# Patient Record
Sex: FEMALE | Race: WHITE | NOT HISPANIC OR LATINO | Employment: FULL TIME | URBAN - METROPOLITAN AREA
[De-identification: names, ages, dates, MRNs, and addresses within clinical notes are randomized per-mention and may not be internally consistent; named-entity substitution may affect disease eponyms.]

---

## 2018-01-11 ENCOUNTER — RECORDS - HEALTHEAST (OUTPATIENT)
Dept: LAB | Facility: CLINIC | Age: 48
End: 2018-01-11

## 2018-01-11 LAB
ALBUMIN SERPL-MCNC: 3.5 G/DL (ref 3.5–5)
ALBUMIN UR-MCNC: NEGATIVE MG/DL
ALP SERPL-CCNC: 74 U/L (ref 45–120)
ALT SERPL W P-5'-P-CCNC: 15 U/L (ref 0–45)
ANION GAP SERPL CALCULATED.3IONS-SCNC: 7 MMOL/L (ref 5–18)
APPEARANCE UR: ABNORMAL
AST SERPL W P-5'-P-CCNC: 18 U/L (ref 0–40)
BACTERIA #/AREA URNS HPF: ABNORMAL HPF
BASOPHILS # BLD AUTO: 0 THOU/UL (ref 0–0.2)
BASOPHILS NFR BLD AUTO: 1 % (ref 0–2)
BILIRUB SERPL-MCNC: 0.5 MG/DL (ref 0–1)
BILIRUB UR QL STRIP: NEGATIVE
BUN SERPL-MCNC: 11 MG/DL (ref 8–22)
C REACTIVE PROTEIN LHE: 0.5 MG/DL (ref 0–0.8)
CALCIUM SERPL-MCNC: 9.1 MG/DL (ref 8.5–10.5)
CHLORIDE BLD-SCNC: 105 MMOL/L (ref 98–107)
CHOLEST SERPL-MCNC: 194 MG/DL
CO2 SERPL-SCNC: 28 MMOL/L (ref 22–31)
COLOR UR AUTO: YELLOW
CREAT SERPL-MCNC: 0.67 MG/DL (ref 0.6–1.1)
EOSINOPHIL # BLD AUTO: 0.1 THOU/UL (ref 0–0.4)
EOSINOPHIL NFR BLD AUTO: 2 % (ref 0–6)
ERYTHROCYTE [DISTWIDTH] IN BLOOD BY AUTOMATED COUNT: 11.9 % (ref 11–14.5)
FASTING STATUS PATIENT QL REPORTED: NO
GFR SERPL CREATININE-BSD FRML MDRD: >60 ML/MIN/1.73M2
GLUCOSE BLD-MCNC: 69 MG/DL (ref 70–125)
GLUCOSE UR STRIP-MCNC: NEGATIVE MG/DL
HCT VFR BLD AUTO: 37.7 % (ref 35–47)
HDLC SERPL-MCNC: 57 MG/DL
HGB BLD-MCNC: 12.5 G/DL (ref 12–16)
HGB UR QL STRIP: ABNORMAL
KETONES UR STRIP-MCNC: NEGATIVE MG/DL
LDLC SERPL CALC-MCNC: 118 MG/DL
LEUKOCYTE ESTERASE UR QL STRIP: ABNORMAL
LYMPHOCYTES # BLD AUTO: 1.5 THOU/UL (ref 0.8–4.4)
LYMPHOCYTES NFR BLD AUTO: 26 % (ref 20–40)
MCH RBC QN AUTO: 31.6 PG (ref 27–34)
MCHC RBC AUTO-ENTMCNC: 33.2 G/DL (ref 32–36)
MCV RBC AUTO: 95 FL (ref 80–100)
MONOCYTES # BLD AUTO: 0.5 THOU/UL (ref 0–0.9)
MONOCYTES NFR BLD AUTO: 9 % (ref 2–10)
MUCOUS THREADS #/AREA URNS LPF: ABNORMAL LPF
NEUTROPHILS # BLD AUTO: 3.6 THOU/UL (ref 2–7.7)
NEUTROPHILS NFR BLD AUTO: 62 % (ref 50–70)
NITRATE UR QL: NEGATIVE
PH UR STRIP: 5 [PH] (ref 4.5–8)
PLATELET # BLD AUTO: 261 THOU/UL (ref 140–440)
PMV BLD AUTO: 10.7 FL (ref 8.5–12.5)
POTASSIUM BLD-SCNC: 4.8 MMOL/L (ref 3.5–5)
PROT SERPL-MCNC: 7.1 G/DL (ref 6–8)
RBC # BLD AUTO: 3.96 MILL/UL (ref 3.8–5.4)
RBC #/AREA URNS AUTO: ABNORMAL HPF
SODIUM SERPL-SCNC: 140 MMOL/L (ref 136–145)
SP GR UR STRIP: 1.02 (ref 1–1.03)
SQUAMOUS #/AREA URNS AUTO: >100 LPF
T4 FREE SERPL-MCNC: 0.9 NG/DL (ref 0.7–1.8)
TRIGL SERPL-MCNC: 97 MG/DL
TSH SERPL DL<=0.005 MIU/L-ACNC: 1.52 UIU/ML (ref 0.3–5)
UROBILINOGEN UR STRIP-ACNC: ABNORMAL
WBC #/AREA URNS AUTO: ABNORMAL HPF
WBC: 5.8 THOU/UL (ref 4–11)

## 2018-01-12 LAB
BACTERIA SPEC CULT: NO GROWTH
BKR LAB AP ABNORMAL BLEEDING: NO
BKR LAB AP BIRTH CONTROL/HORMONES: NORMAL
BKR LAB AP CERVICAL APPEARANCE: NORMAL
BKR LAB AP GYN ADEQUACY: NORMAL
BKR LAB AP GYN INTERPRETATION: NORMAL
BKR LAB AP GYN OTHER FINDINGS: NORMAL
BKR LAB AP HPV REFLEX: NORMAL
BKR LAB AP LMP: NORMAL
BKR LAB AP PATIENT STATUS: NORMAL
BKR LAB AP PREVIOUS ABNORMAL: NORMAL
BKR LAB AP PREVIOUS NORMAL: 2016
HIGH RISK?: NO
PATH REPORT.COMMENTS IMP SPEC: NORMAL
RESULT FLAG (HE HISTORICAL CONVERSION): NORMAL

## 2018-01-30 ENCOUNTER — RECORDS - HEALTHEAST (OUTPATIENT)
Dept: LAB | Facility: CLINIC | Age: 48
End: 2018-01-30

## 2018-02-01 LAB — BACTERIA SPEC CULT: NORMAL

## 2018-02-08 ENCOUNTER — TRANSFERRED RECORDS (OUTPATIENT)
Dept: HEALTH INFORMATION MANAGEMENT | Facility: CLINIC | Age: 48
End: 2018-02-08

## 2018-05-23 ENCOUNTER — RECORDS - HEALTHEAST (OUTPATIENT)
Dept: LAB | Facility: CLINIC | Age: 48
End: 2018-05-23

## 2018-05-23 LAB
ALBUMIN SERPL-MCNC: 3.7 G/DL (ref 3.5–5)
ALBUMIN UR-MCNC: NEGATIVE MG/DL
ALP SERPL-CCNC: 79 U/L (ref 45–120)
ALT SERPL W P-5'-P-CCNC: <9 U/L (ref 0–45)
ANION GAP SERPL CALCULATED.3IONS-SCNC: 8 MMOL/L (ref 5–18)
APPEARANCE UR: ABNORMAL
AST SERPL W P-5'-P-CCNC: 16 U/L (ref 0–40)
BACTERIA #/AREA URNS HPF: ABNORMAL HPF
BASOPHILS # BLD AUTO: 0.1 THOU/UL (ref 0–0.2)
BASOPHILS NFR BLD AUTO: 1 % (ref 0–2)
BILIRUB SERPL-MCNC: 0.5 MG/DL (ref 0–1)
BILIRUB UR QL STRIP: NEGATIVE
BUN SERPL-MCNC: 7 MG/DL (ref 8–22)
CALCIUM SERPL-MCNC: 9.2 MG/DL (ref 8.5–10.5)
CHLORIDE BLD-SCNC: 107 MMOL/L (ref 98–107)
CHOLEST SERPL-MCNC: 200 MG/DL
CO2 SERPL-SCNC: 26 MMOL/L (ref 22–31)
COLOR UR AUTO: YELLOW
CREAT SERPL-MCNC: 0.69 MG/DL (ref 0.6–1.1)
EOSINOPHIL # BLD AUTO: 0.1 THOU/UL (ref 0–0.4)
EOSINOPHIL NFR BLD AUTO: 2 % (ref 0–6)
ERYTHROCYTE [DISTWIDTH] IN BLOOD BY AUTOMATED COUNT: 12 % (ref 11–14.5)
FASTING STATUS PATIENT QL REPORTED: ABNORMAL
FERRITIN SERPL-MCNC: 145 NG/ML (ref 10–130)
GFR SERPL CREATININE-BSD FRML MDRD: >60 ML/MIN/1.73M2
GLUCOSE BLD-MCNC: 81 MG/DL (ref 70–125)
GLUCOSE UR STRIP-MCNC: NEGATIVE MG/DL
HCT VFR BLD AUTO: 37.8 % (ref 35–47)
HDLC SERPL-MCNC: 50 MG/DL
HGB BLD-MCNC: 12.3 G/DL (ref 12–16)
HGB UR QL STRIP: NEGATIVE
IRON SATN MFR SERPL: 53 % (ref 20–50)
IRON SERPL-MCNC: 121 UG/DL (ref 42–175)
KETONES UR STRIP-MCNC: NEGATIVE MG/DL
LDLC SERPL CALC-MCNC: 131 MG/DL
LEUKOCYTE ESTERASE UR QL STRIP: NEGATIVE
LYMPHOCYTES # BLD AUTO: 1.5 THOU/UL (ref 0.8–4.4)
LYMPHOCYTES NFR BLD AUTO: 29 % (ref 20–40)
MCH RBC QN AUTO: 31.5 PG (ref 27–34)
MCHC RBC AUTO-ENTMCNC: 32.5 G/DL (ref 32–36)
MCV RBC AUTO: 97 FL (ref 80–100)
MONOCYTES # BLD AUTO: 0.4 THOU/UL (ref 0–0.9)
MONOCYTES NFR BLD AUTO: 8 % (ref 2–10)
MONOCYTES NFR BLD AUTO: POSITIVE %
MUCOUS THREADS #/AREA URNS LPF: ABNORMAL LPF
NEUTROPHILS # BLD AUTO: 3.2 THOU/UL (ref 2–7.7)
NEUTROPHILS NFR BLD AUTO: 61 % (ref 50–70)
NITRATE UR QL: NEGATIVE
PH UR STRIP: 7.5 [PH] (ref 4.5–8)
PLATELET # BLD AUTO: 246 THOU/UL (ref 140–440)
PMV BLD AUTO: 11.2 FL (ref 8.5–12.5)
POTASSIUM BLD-SCNC: 4.4 MMOL/L (ref 3.5–5)
PROT SERPL-MCNC: 6.9 G/DL (ref 6–8)
RBC # BLD AUTO: 3.9 MILL/UL (ref 3.8–5.4)
RBC #/AREA URNS AUTO: ABNORMAL HPF
SODIUM SERPL-SCNC: 141 MMOL/L (ref 136–145)
SP GR UR STRIP: 1.01 (ref 1–1.03)
SQUAMOUS #/AREA URNS AUTO: >100 LPF
T4 FREE SERPL-MCNC: 0.8 NG/DL (ref 0.7–1.8)
TIBC SERPL-MCNC: 227 UG/DL (ref 313–563)
TRANSFERRIN SERPL-MCNC: 182 MG/DL (ref 212–360)
TRIGL SERPL-MCNC: 94 MG/DL
TSH SERPL DL<=0.005 MIU/L-ACNC: 1.27 UIU/ML (ref 0.3–5)
UROBILINOGEN UR STRIP-ACNC: ABNORMAL
WBC #/AREA URNS AUTO: ABNORMAL HPF
WBC: 5.2 THOU/UL (ref 4–11)

## 2018-08-15 ENCOUNTER — TRANSFERRED RECORDS (OUTPATIENT)
Dept: HEALTH INFORMATION MANAGEMENT | Facility: CLINIC | Age: 48
End: 2018-08-15

## 2018-08-15 ENCOUNTER — RECORDS - HEALTHEAST (OUTPATIENT)
Dept: LAB | Facility: CLINIC | Age: 48
End: 2018-08-15

## 2018-08-15 LAB
ALBUMIN SERPL-MCNC: 3.5 G/DL (ref 3.5–5)
ALP SERPL-CCNC: 67 U/L (ref 45–120)
ALT SERPL W P-5'-P-CCNC: 17 U/L (ref 0–45)
ANION GAP SERPL CALCULATED.3IONS-SCNC: 12 MMOL/L (ref 5–18)
AST SERPL W P-5'-P-CCNC: 33 U/L (ref 0–40)
BASOPHILS # BLD AUTO: 0 THOU/UL (ref 0–0.2)
BASOPHILS NFR BLD AUTO: 1 % (ref 0–2)
BILIRUB SERPL-MCNC: 0.5 MG/DL (ref 0–1)
BUN SERPL-MCNC: 11 MG/DL (ref 8–22)
CALCIUM SERPL-MCNC: 9.2 MG/DL (ref 8.5–10.5)
CHLORIDE BLD-SCNC: 104 MMOL/L (ref 98–107)
CO2 SERPL-SCNC: 23 MMOL/L (ref 22–31)
CREAT SERPL-MCNC: 0.71 MG/DL (ref 0.6–1.1)
EOSINOPHIL # BLD AUTO: 0.1 THOU/UL (ref 0–0.4)
EOSINOPHIL NFR BLD AUTO: 1 % (ref 0–6)
ERYTHROCYTE [DISTWIDTH] IN BLOOD BY AUTOMATED COUNT: 11.9 % (ref 11–14.5)
GFR SERPL CREATININE-BSD FRML MDRD: >60 ML/MIN/1.73M2
GLUCOSE BLD-MCNC: 95 MG/DL (ref 70–125)
HCT VFR BLD AUTO: 38.6 % (ref 35–47)
HGB BLD-MCNC: 12.9 G/DL (ref 12–16)
LYMPHOCYTES # BLD AUTO: 1.4 THOU/UL (ref 0.8–4.4)
LYMPHOCYTES NFR BLD AUTO: 21 % (ref 20–40)
MCH RBC QN AUTO: 31.9 PG (ref 27–34)
MCHC RBC AUTO-ENTMCNC: 33.4 G/DL (ref 32–36)
MCV RBC AUTO: 95 FL (ref 80–100)
MONOCYTES # BLD AUTO: 0.4 THOU/UL (ref 0–0.9)
MONOCYTES NFR BLD AUTO: 6 % (ref 2–10)
NEUTROPHILS # BLD AUTO: 4.8 THOU/UL (ref 2–7.7)
NEUTROPHILS NFR BLD AUTO: 71 % (ref 50–70)
PLATELET # BLD AUTO: 233 THOU/UL (ref 140–440)
PMV BLD AUTO: 11.5 FL (ref 8.5–12.5)
POTASSIUM BLD-SCNC: 4 MMOL/L (ref 3.5–5)
PROT SERPL-MCNC: 6.7 G/DL (ref 6–8)
RBC # BLD AUTO: 4.05 MILL/UL (ref 3.8–5.4)
SODIUM SERPL-SCNC: 139 MMOL/L (ref 136–145)
WBC: 6.9 THOU/UL (ref 4–11)

## 2018-08-16 LAB — B BURGDOR IGG+IGM SER QL: 0.31 INDEX VALUE

## 2018-12-11 ENCOUNTER — RECORDS - HEALTHEAST (OUTPATIENT)
Dept: LAB | Facility: CLINIC | Age: 48
End: 2018-12-11

## 2018-12-11 LAB
BASOPHILS # BLD AUTO: 0 THOU/UL (ref 0–0.2)
BASOPHILS NFR BLD AUTO: 1 % (ref 0–2)
EOSINOPHIL # BLD AUTO: 0.1 THOU/UL (ref 0–0.4)
EOSINOPHIL NFR BLD AUTO: 1 % (ref 0–6)
ERYTHROCYTE [DISTWIDTH] IN BLOOD BY AUTOMATED COUNT: 12.1 % (ref 11–14.5)
HCT VFR BLD AUTO: 39.8 % (ref 35–47)
HGB BLD-MCNC: 12.9 G/DL (ref 12–16)
LYMPHOCYTES # BLD AUTO: 0.8 THOU/UL (ref 0.8–4.4)
LYMPHOCYTES NFR BLD AUTO: 10 % (ref 20–40)
MCH RBC QN AUTO: 30.9 PG (ref 27–34)
MCHC RBC AUTO-ENTMCNC: 32.4 G/DL (ref 32–36)
MCV RBC AUTO: 95 FL (ref 80–100)
MONOCYTES # BLD AUTO: 0.8 THOU/UL (ref 0–0.9)
MONOCYTES NFR BLD AUTO: 10 % (ref 2–10)
NEUTROPHILS # BLD AUTO: 6.4 THOU/UL (ref 2–7.7)
NEUTROPHILS NFR BLD AUTO: 79 % (ref 50–70)
PLATELET # BLD AUTO: 229 THOU/UL (ref 140–440)
PMV BLD AUTO: 10.8 FL (ref 8.5–12.5)
RBC # BLD AUTO: 4.17 MILL/UL (ref 3.8–5.4)
WBC: 8.1 THOU/UL (ref 4–11)

## 2018-12-13 LAB — BACTERIA SPEC CULT: NORMAL

## 2019-05-17 ENCOUNTER — RECORDS - HEALTHEAST (OUTPATIENT)
Dept: LAB | Facility: CLINIC | Age: 49
End: 2019-05-17

## 2019-05-17 LAB
CHOLEST SERPL-MCNC: 211 MG/DL
FASTING STATUS PATIENT QL REPORTED: NO
HDLC SERPL-MCNC: 53 MG/DL
LDLC SERPL CALC-MCNC: 133 MG/DL
TRIGL SERPL-MCNC: 126 MG/DL

## 2019-05-21 ENCOUNTER — TRANSFERRED RECORDS (OUTPATIENT)
Dept: HEALTH INFORMATION MANAGEMENT | Facility: CLINIC | Age: 49
End: 2019-05-21

## 2019-10-28 ENCOUNTER — RECORDS - HEALTHEAST (OUTPATIENT)
Dept: LAB | Facility: CLINIC | Age: 49
End: 2019-10-28

## 2019-10-28 ENCOUNTER — TRANSFERRED RECORDS (OUTPATIENT)
Dept: HEALTH INFORMATION MANAGEMENT | Facility: CLINIC | Age: 49
End: 2019-10-28

## 2019-10-28 LAB
ALBUMIN UR-MCNC: ABNORMAL MG/DL
APPEARANCE UR: CLEAR
BACTERIA #/AREA URNS HPF: ABNORMAL HPF
BASOPHILS # BLD AUTO: 0 THOU/UL (ref 0–0.2)
BASOPHILS NFR BLD AUTO: 0 % (ref 0–2)
BILIRUB UR QL STRIP: NEGATIVE
COLOR UR AUTO: YELLOW
EOSINOPHIL # BLD AUTO: 0.1 THOU/UL (ref 0–0.4)
EOSINOPHIL NFR BLD AUTO: 1 % (ref 0–6)
ERYTHROCYTE [DISTWIDTH] IN BLOOD BY AUTOMATED COUNT: 11.9 % (ref 11–14.5)
GLUCOSE UR STRIP-MCNC: NEGATIVE MG/DL
HCT VFR BLD AUTO: 38 % (ref 35–47)
HGB BLD-MCNC: 12.5 G/DL (ref 12–16)
HGB UR QL STRIP: ABNORMAL
KETONES UR STRIP-MCNC: NEGATIVE MG/DL
LEUKOCYTE ESTERASE UR QL STRIP: NEGATIVE
LYMPHOCYTES # BLD AUTO: 1.5 THOU/UL (ref 0.8–4.4)
LYMPHOCYTES NFR BLD AUTO: 21 % (ref 20–40)
MCH RBC QN AUTO: 31 PG (ref 27–34)
MCHC RBC AUTO-ENTMCNC: 32.9 G/DL (ref 32–36)
MCV RBC AUTO: 94 FL (ref 80–100)
MONOCYTES # BLD AUTO: 0.5 THOU/UL (ref 0–0.9)
MONOCYTES NFR BLD AUTO: 8 % (ref 2–10)
MUCOUS THREADS #/AREA URNS LPF: ABNORMAL LPF
NEUTROPHILS # BLD AUTO: 4.7 THOU/UL (ref 2–7.7)
NEUTROPHILS NFR BLD AUTO: 69 % (ref 50–70)
NITRATE UR QL: NEGATIVE
PH UR STRIP: 5 [PH] (ref 4.5–8)
PLATELET # BLD AUTO: 243 THOU/UL (ref 140–440)
PMV BLD AUTO: 11.2 FL (ref 8.5–12.5)
RBC # BLD AUTO: 4.03 MILL/UL (ref 3.8–5.4)
RBC #/AREA URNS AUTO: ABNORMAL HPF
SP GR UR STRIP: 1.02 (ref 1–1.03)
SQUAMOUS #/AREA URNS AUTO: ABNORMAL LPF
URATE CRY #/AREA URNS HPF: PRESENT /[HPF]
UROBILINOGEN UR STRIP-ACNC: ABNORMAL
WBC #/AREA URNS AUTO: ABNORMAL HPF
WBC CLUMPS #/AREA URNS HPF: PRESENT /[HPF]
WBC: 6.9 THOU/UL (ref 4–11)

## 2019-10-30 LAB — BACTERIA SPEC CULT: NORMAL

## 2020-01-06 ENCOUNTER — TRANSFERRED RECORDS (OUTPATIENT)
Dept: HEALTH INFORMATION MANAGEMENT | Facility: CLINIC | Age: 50
End: 2020-01-06

## 2020-02-21 ENCOUNTER — OFFICE VISIT (OUTPATIENT)
Dept: FAMILY MEDICINE | Facility: CLINIC | Age: 50
End: 2020-02-21
Attending: FAMILY MEDICINE
Payer: COMMERCIAL

## 2020-02-21 ENCOUNTER — TELEPHONE (OUTPATIENT)
Dept: GASTROENTEROLOGY | Facility: CLINIC | Age: 50
End: 2020-02-21

## 2020-02-21 VITALS
SYSTOLIC BLOOD PRESSURE: 145 MMHG | BODY MASS INDEX: 31.97 KG/M2 | HEART RATE: 61 BPM | HEIGHT: 65 IN | DIASTOLIC BLOOD PRESSURE: 86 MMHG | WEIGHT: 191.9 LBS

## 2020-02-21 DIAGNOSIS — Z13.220 LIPID SCREENING: ICD-10-CM

## 2020-02-21 DIAGNOSIS — R03.0 ELEVATED BP WITHOUT DIAGNOSIS OF HYPERTENSION: ICD-10-CM

## 2020-02-21 DIAGNOSIS — Z82.49 FAMILY HISTORY OF EARLY CAD: ICD-10-CM

## 2020-02-21 DIAGNOSIS — Z12.11 SPECIAL SCREENING FOR MALIGNANT NEOPLASMS, COLON: Primary | ICD-10-CM

## 2020-02-21 DIAGNOSIS — Z13.21 ENCOUNTER FOR VITAMIN DEFICIENCY SCREENING: ICD-10-CM

## 2020-02-21 DIAGNOSIS — Z82.49: ICD-10-CM

## 2020-02-21 DIAGNOSIS — Z13.1 SCREENING FOR DIABETES MELLITUS: ICD-10-CM

## 2020-02-21 DIAGNOSIS — Z12.39 SCREENING FOR BREAST CANCER: ICD-10-CM

## 2020-02-21 LAB
ANION GAP SERPL CALCULATED.3IONS-SCNC: 3 MMOL/L (ref 3–14)
BUN SERPL-MCNC: 10 MG/DL (ref 7–30)
CALCIUM SERPL-MCNC: 8.7 MG/DL (ref 8.5–10.1)
CHLORIDE SERPL-SCNC: 109 MMOL/L (ref 94–109)
CHOLEST SERPL-MCNC: 229 MG/DL
CO2 SERPL-SCNC: 28 MMOL/L (ref 20–32)
CREAT SERPL-MCNC: 0.64 MG/DL (ref 0.52–1.04)
GFR SERPL CREATININE-BSD FRML MDRD: >90 ML/MIN/{1.73_M2}
GLUCOSE SERPL-MCNC: 79 MG/DL (ref 70–99)
HDLC SERPL-MCNC: 63 MG/DL
LDLC SERPL CALC-MCNC: 144 MG/DL
NONHDLC SERPL-MCNC: 166 MG/DL
POTASSIUM SERPL-SCNC: 4.4 MMOL/L (ref 3.4–5.3)
SODIUM SERPL-SCNC: 140 MMOL/L (ref 133–144)
TRIGL SERPL-MCNC: 110 MG/DL
TSH SERPL DL<=0.005 MIU/L-ACNC: 1.44 MU/L (ref 0.4–4)

## 2020-02-21 PROCEDURE — 80061 LIPID PANEL: CPT | Performed by: FAMILY MEDICINE

## 2020-02-21 PROCEDURE — 82306 VITAMIN D 25 HYDROXY: CPT | Performed by: FAMILY MEDICINE

## 2020-02-21 PROCEDURE — 84443 ASSAY THYROID STIM HORMONE: CPT | Performed by: FAMILY MEDICINE

## 2020-02-21 PROCEDURE — 90750 HZV VACC RECOMBINANT IM: CPT | Mod: ZF

## 2020-02-21 PROCEDURE — 80048 BASIC METABOLIC PNL TOTAL CA: CPT | Performed by: FAMILY MEDICINE

## 2020-02-21 PROCEDURE — G0463 HOSPITAL OUTPT CLINIC VISIT: HCPCS | Mod: ZF,25

## 2020-02-21 PROCEDURE — 25000581 ZZH RX MED A9270 GY (STAT IND- M) 250: Mod: ZF

## 2020-02-21 PROCEDURE — 36415 COLL VENOUS BLD VENIPUNCTURE: CPT | Performed by: FAMILY MEDICINE

## 2020-02-21 PROCEDURE — 90471 IMMUNIZATION ADMIN: CPT | Mod: ZF

## 2020-02-21 RX ORDER — ESCITALOPRAM OXALATE 10 MG/1
TABLET ORAL
COMMUNITY
Start: 2020-01-20 | End: 2021-07-21

## 2020-02-21 ASSESSMENT — ANXIETY QUESTIONNAIRES
2. NOT BEING ABLE TO STOP OR CONTROL WORRYING: NOT AT ALL
5. BEING SO RESTLESS THAT IT IS HARD TO SIT STILL: NOT AT ALL
GAD7 TOTAL SCORE: 0
3. WORRYING TOO MUCH ABOUT DIFFERENT THINGS: NOT AT ALL
7. FEELING AFRAID AS IF SOMETHING AWFUL MIGHT HAPPEN: NOT AT ALL
6. BECOMING EASILY ANNOYED OR IRRITABLE: NOT AT ALL
1. FEELING NERVOUS, ANXIOUS, OR ON EDGE: NOT AT ALL

## 2020-02-21 ASSESSMENT — PATIENT HEALTH QUESTIONNAIRE - PHQ9: 5. POOR APPETITE OR OVEREATING: NOT AT ALL

## 2020-02-21 ASSESSMENT — MIFFLIN-ST. JEOR: SCORE: 1486.33

## 2020-02-21 NOTE — LETTER
2020       RE: Karin DARBY Neeta  [address] [place]  [facility] [address]     Dear Colleague,    Thank you for referring your patient, Karin Santacruz, to the [facility] at [facility]. Please see a copy of my visit note below.    Pt. Here for CPE, new patient     1. Concern  About bp going up-has-not checked outside office, formerly had been 110/70, at drJeffrey Visit with URI recently SBP was in 130's    2.  Weight  Gain x 10 years.  Tried intermittent fasting--don't eat till 12pm, stop at 8pm, otherwise water only.   Weight had plateaud with this but feels better, working on mindful on what eat/portion during eating time.  More difficult losing weight after age 47. Activity 2x/wk x 6 months--circuit training cardio 30 min. prior 2 years ago, did moderate cross fit, but then tore meniscus.   3. Breast--no concerns; mammogram due, no prior abnormals  4. Gyne--menses still regular, no hot flashes/night sweats, last 3 days, light  Last Pap 2 years ago, no record, hpv done? Will need records;   ,  both  ; post partum hemorrhage, no pregnancy conplications  8lb 14 oz biggest (children now 18, 20)  Vaginal --no concerns  Urinary--no oncerns  No sexual concerns  Currently sexual active w/; no outside partners  No  Hx STI, hiv testing:  Never tested except maybe  Pregnancy  Cannot find immunization records  Traveled to [place]--? May had  Hep B vaccine  Due Shingrix  Did flu vaccine    8. Bone health--little dairy; activity as above; grandmother osteoporosis  9.HCM--immunization?  --Due Colon cancer screening: prefers colonscopy  --had  Cardiac calcium scoring 2018: zero  Lipid 2019,  Total vs. [id number]--vboadfc  Glucose screening not available      PMH  Shigella at child  No other surgeries--cataracts    Family History   Problem Relation Age of Onset     Breast Cancer Mother      Melanoma Mother      Angina Father      Hypertension Father   "    Mother--breast ca. 81 htn lipid melnoma,thyroid, renal artery stenoisis  Father--htn, d. MI 42, lipids  Sibs: sister--htn, hashimotos, renal artery stenosis  p grandfather--d. MI under 50   Mat grandfather--d MI under 50  Mat grandmother--htn, lipid  Niece bipolar      Social History     Socioeconomic History     Marital status: Single     Spouse name: None     Number of children: None     Years of education: None     Highest education level: None   Occupational History     None   Social Needs     Financial resource strain: None     Food insecurity:     Worry: None     Inability: None     Transportation needs:     Medical: None     Non-medical: None   Tobacco Use     Smoking status: Former Smoker     Smokeless tobacco: Never Used   Substance and Sexual Activity     Alcohol use: Yes     Comment: occ     Drug use: Never     Sexual activity: Yes     Partners: Male   Lifestyle     Physical activity:     Days per week: None     Minutes per session: None     Stress: None   Relationships     Social connections:     Talks on phone: None     Gets together: None     Attends Restorationism service: None     Active member of club or organization: None     Attends meetings of clubs or organizations: None     Relationship status: None     Intimate partner violence:     Fear of current or ex partner: None     Emotionally abused: None     Physically abused: None     Forced sexual activity: None   Other Topics Concern     None   Social History Narrative     None     stanard diet little diet  No supplement --consider Vit D  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  Body mass index is 31.93 kg/m .   Constitutional: Well appearing woman in no acute distress.   Psychological: appropriate mood.  Eyes: anicteric, normal extra-ocular movements,  pupils are equal and reactive to light.   Ears, Nose and Throat: tympanic membranes clear, nose clear and free of lesions, throat clear, moist mucous membrames, neck supple with full range of motion.    Neck: No thyroidmegaly. No jugular venous distension, no carotid bruits.  Cardiovascular: regular rate and rhythm, normal S1 and S2, no murmurs, rubs or gallops, peripheral pulses full and symmetric   Respiratory: clear to auscultation, no wheezes or crackles, normal breath sounds.    Gastrointestinal: positive bowel sounds, nontender, no hepatosplenomegaly, no masses. No guarding or rebound.    Lymphatic: no cervical lymphadenopathy.  Musculoskeletal: full range of motion, no edema and motor strength is equal in the upper and lower extremities    Skin: no concerning lesions, no jaundice. Small skin tags under bra line, eyelids  Neurological: cranial nerves intact, normal strength and sensation, reflexes at patella and biceps normal, normal gait, no tremor.   Monofilament Foot Exam: n/a  A/P  1. HCM  Immunizations--  Shingrix today, to request rest of immunization records from previous clinic    Cancer screening--Need:  Mammogram  Colonoscopy   Request record on last Pap/HPV screening, and will schedule pelvic exam if needed    CV risk- Reviewed family history with patient, cardiac calcium score reassuring. Repeat lipids, fasting glucose, see BP below    Bone Health--Up to date, continue weight bearing activity, check Vit.  D    STI/HIV Screening --Reviewed recommendation for at least 1 HIV screen in lifetime.     2. Elevated BP  3. Fhx renal artery stenosis  Check  bp outside office different times per day;5-6 times over next few weeks  Write down results--goal is 130/80 or less. If averaging high, consider further  evaluation, especially for renal artery stenosis  Check BMP, TSH    4. Weight gain:   discussed set points, metabolism change with age, recommend 5% weight loss goal for health benefit, Counseled regarding simple diet changes, include  Reduce portion simple carbs    Follow-up 3 months blood pressure      Again, thank you for allowing me to participate in the care of your patient.      Sincerely,    Heron Manning MD

## 2020-02-21 NOTE — LETTER
Date:March 4, 2020      Patient was self referred, no letter generated. Do not send.        [facility] Information

## 2020-02-21 NOTE — PROGRESS NOTES
Pt. Here for CPE, new patient     1. Concern  About bp going up-has-not checked outside office, formerly had been 110/70, at  Visit with ARGELIA recently SBP was in 130's    2.  Weight  Gain x 10 years.  Tried intermittent fasting--don't eat till 12pm, stop at 8pm, otherwise water only.   Weight had plateaud with this but feels better, working on mindful on what eat/portion during eating time.  More difficult losing weight after age 47. Activity 2x/wk x 6 months--circuit training cardio 30 min. prior 2 years ago, did moderate cross fit, but then tore meniscus.   3. Breast--no concerns; mammogram due, no prior abnormals  4. Gyne--menses still regular, no hot flashes/night sweats, last 3 days, light  Last Pap 2 years ago, no record, hpv done? Will need records;   ,  both  ; post partum hemorrhage, no pregnancy conplications  8lb 14 oz biggest (children now 18, 20)  Vaginal --no concerns  Urinary--no oncerns  No sexual concerns  Currently sexual active w/; no outside partners  No  Hx STI, hiv testing:  Never tested except maybe  Pregnancy  Cannot find immunization records  Traveled to [place]--? May had  Hep B vaccine  Due Shingrix  Did flu vaccine    8. Bone health--little dairy; activity as above; grandmother osteoporosis  9.HCM--immunization?  --Due Colon cancer screening: prefers colonscopy  --had  Cardiac calcium scoring 2018: zero  Lipid 2019,  Total vs. DIG213--gtljlvk  Glucose screening not available      PMH  Shigella at child  No other surgeries--cataracts    Family History   Problem Relation Age of Onset     Breast Cancer Mother      Melanoma Mother      Angina Father      Hypertension Father      Mother--breast ca. 81 htn lipid melnoma,thyroid, renal artery stenoisis  Father--htn, d. MI 42, lipids  Sibs: sister--htn, hashimotos, renal artery stenosis  p grandfather--d. MI under 50   Mat grandfather--d MI under 50  Mat grandmother--htn, lipid  Niece bipolar      Social History  "    Socioeconomic History     Marital status: Single     Spouse name: None     Number of children: None     Years of education: None     Highest education level: None   Occupational History     None   Social Needs     Financial resource strain: None     Food insecurity:     Worry: None     Inability: None     Transportation needs:     Medical: None     Non-medical: None   Tobacco Use     Smoking status: Former Smoker     Smokeless tobacco: Never Used   Substance and Sexual Activity     Alcohol use: Yes     Comment: occ     Drug use: Never     Sexual activity: Yes     Partners: Male   Lifestyle     Physical activity:     Days per week: None     Minutes per session: None     Stress: None   Relationships     Social connections:     Talks on phone: None     Gets together: None     Attends Mandaen service: None     Active member of club or organization: None     Attends meetings of clubs or organizations: None     Relationship status: None     Intimate partner violence:     Fear of current or ex partner: None     Emotionally abused: None     Physically abused: None     Forced sexual activity: None   Other Topics Concern     None   Social History Narrative     None     stanard diet little diet  No supplement --consider Vit D  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  Body mass index is 31.93 kg/m .   Constitutional: Well appearing woman in no acute distress.   Psychological: appropriate mood.  Eyes: anicteric, normal extra-ocular movements,  pupils are equal and reactive to light.   Ears, Nose and Throat: tympanic membranes clear, " nose clear and free of lesions, throat clear, moist mucous membrames, neck supple with full range of motion.    Neck: No thyroidmegaly. No jugular venous distension, no carotid bruits.  Cardiovascular: regular rate and rhythm, normal S1 and S2, no murmurs, rubs or gallops, peripheral pulses full and symmetric   Respiratory: clear to auscultation, no wheezes or crackles, normal breath sounds.    Gastrointestinal: positive bowel sounds, nontender, no hepatosplenomegaly, no masses. No guarding or rebound.    Lymphatic: no cervical lymphadenopathy.  Musculoskeletal: full range of motion, no edema and motor strength is equal in the upper and lower extremities    Skin: no concerning lesions, no jaundice. Small skin tags under bra line, eyelids  Neurological: cranial nerves intact, normal strength and sensation, reflexes at patella and biceps normal, normal gait, no tremor.   Monofilament Foot Exam: n/a  A/P  1. HCM  Immunizations--  Shingrix today, to request rest of immunization records from previous clinic    Cancer screening--Need:  Mammogram  Colonoscopy   Request record on last Pap/HPV screening, and will schedule pelvic exam if needed    CV risk- Reviewed family history with patient, cardiac calcium score reassuring. Repeat lipids, fasting glucose, see BP below    Bone Health--Up to date, continue weight bearing activity, check Vit.  D    STI/HIV Screening --Reviewed recommendation for at least 1 HIV screen in lifetime.     2. Elevated BP  3. Fhx renal artery stenosis  Check  bp outside office different times per day;5-6 times over next few weeks  Write down results--goal is 130/80 or less. If averaging high, consider further evaluation, especially for renal artery stenosis  Check BMP, TSH    4. Weight gain:   discussed set points, metabolism change with age, recommend 5% weight loss goal for health benefit, Counseled regarding simple diet changes, include  Reduce portion simple carbs    Follow-up 3 months blood  pressure

## 2020-02-22 ASSESSMENT — ANXIETY QUESTIONNAIRES: GAD7 TOTAL SCORE: 0

## 2020-02-24 ENCOUNTER — TELEPHONE (OUTPATIENT)
Dept: GASTROENTEROLOGY | Facility: CLINIC | Age: 50
End: 2020-02-24

## 2020-02-25 ENCOUNTER — TELEPHONE (OUTPATIENT)
Dept: GASTROENTEROLOGY | Facility: CLINIC | Age: 50
End: 2020-02-25

## 2020-02-25 LAB
DEPRECATED CALCIDIOL+CALCIFEROL SERPL-MC: <14 UG/L (ref 20–75)
VITAMIN D2 SERPL-MCNC: <5 UG/L
VITAMIN D3 SERPL-MCNC: 9 UG/L

## 2020-03-02 ENCOUNTER — ANCILLARY PROCEDURE (OUTPATIENT)
Dept: MAMMOGRAPHY | Facility: CLINIC | Age: 50
End: 2020-03-02
Attending: OBSTETRICS & GYNECOLOGY
Payer: COMMERCIAL

## 2020-03-02 DIAGNOSIS — Z12.39 SCREENING FOR BREAST CANCER: ICD-10-CM

## 2020-03-02 PROCEDURE — 77067 SCR MAMMO BI INCL CAD: CPT

## 2020-03-03 ENCOUNTER — DOCUMENTATION ONLY (OUTPATIENT)
Dept: CARE COORDINATION | Facility: CLINIC | Age: 50
End: 2020-03-03

## 2020-03-03 PROBLEM — Z82.49 FAMILY HISTORY OF EARLY CAD: Status: ACTIVE | Noted: 2020-03-03

## 2020-03-03 PROBLEM — Z82.49 FAMILY HISTORY OF RENAL ARTERY STENOSIS: Status: ACTIVE | Noted: 2020-03-03

## 2020-03-03 PROBLEM — R03.0 ELEVATED BP WITHOUT DIAGNOSIS OF HYPERTENSION: Status: ACTIVE | Noted: 2020-03-03

## 2020-03-04 DIAGNOSIS — E55.9 VITAMIN D DEFICIENCY: Primary | ICD-10-CM

## 2020-03-04 RX ORDER — ERGOCALCIFEROL 1.25 MG/1
50000 CAPSULE, LIQUID FILLED ORAL WEEKLY
Qty: 8 CAPSULE | Refills: 0 | Status: SHIPPED | OUTPATIENT
Start: 2020-03-04 | End: 2020-05-13

## 2020-03-04 NOTE — RESULT ENCOUNTER NOTE
Please call with results; Vitamin D low. I will send in prescription Vitamin D (50,000  Units weekly) for 8 weeks, then patient to continue with 1000 units over the counter daily.  Cholesterol is mildly high, no need for medication, rest of labs normal    Heron Manning MD

## 2020-03-10 NOTE — RESULT ENCOUNTER NOTE
Dear Karin,   Here are your recent results which are within the expected range. Please continue with your current plan of care.       Heron Manning MD

## 2020-05-06 ENCOUNTER — TELEPHONE (OUTPATIENT)
Dept: OBGYN | Facility: CLINIC | Age: 50
End: 2020-05-06

## 2020-05-06 DIAGNOSIS — E55.9 HYPOVITAMINOSIS D: Primary | ICD-10-CM

## 2020-05-06 RX ORDER — VITAMIN B COMPLEX
1000 TABLET ORAL DAILY
Qty: 90 TABLET | Refills: 3 | Status: SHIPPED | OUTPATIENT
Start: 2020-05-06

## 2020-05-06 NOTE — TELEPHONE ENCOUNTER
Pt called and left message on nursing voice mail did we receive transferred records from [facility]?    Called and left message on voice mail to please call back and speak with nursing at [phone].  She had earlier call for med questions that we need to discuss.  Also I do see records transferred from [facility].

## 2020-05-06 NOTE — TELEPHONE ENCOUNTER
Received refill request for vitamin D2 50,000 unit tablet.   This was ordered 3/2020. When done, pt was to change to 1000 units daily.     Tried to reach Karin but received voicemail.  Left message that refill request was received for high dose vitamin D but Dr. Manning wants her to change to daily maintenance dose. This will be sent to pharmacy. Call clinic with questions.

## 2020-05-06 NOTE — TELEPHONE ENCOUNTER
Left message on identified VM to return call to nursing at [phone] to discuss questions she has on medication.

## 2020-05-06 NOTE — TELEPHONE ENCOUNTER
----- Message from Jacquelni Messina sent at 5/6/2020 12:03 PM CDT -----  Regarding: medicaiton question  Contact: [phone]  [facility]    Phone Message    May a detailed message be left on voicemail: yes     Reason for Call: Medication Question or concern regarding medication   Prescription Clarification  Name of Medication: vitamin D2 50,000 unit  Prescribing Provider: arpita   Pharmacy: [facility] [id number] [place] [address]    What on the order needs clarification? Per Shelly Is wanting to get a call back in regards to getting a new prescription, please advise.           Action Taken: Message routed to:  Other: womens health    Travel Screening: Not Applicable

## 2020-05-13 ENCOUNTER — VIRTUAL VISIT (OUTPATIENT)
Dept: FAMILY MEDICINE | Facility: CLINIC | Age: 50
End: 2020-05-13
Attending: FAMILY MEDICINE
Payer: COMMERCIAL

## 2020-05-13 VITALS — DIASTOLIC BLOOD PRESSURE: 76 MMHG | SYSTOLIC BLOOD PRESSURE: 133 MMHG

## 2020-05-13 DIAGNOSIS — R10.31 GROIN PAIN, RIGHT: Primary | ICD-10-CM

## 2020-05-13 RX ORDER — ESCITALOPRAM OXALATE 10 MG/1
TABLET ORAL
Status: CANCELLED | OUTPATIENT
Start: 2020-05-13

## 2020-05-13 ASSESSMENT — ANXIETY QUESTIONNAIRES
IF YOU CHECKED OFF ANY PROBLEMS ON THIS QUESTIONNAIRE, HOW DIFFICULT HAVE THESE PROBLEMS MADE IT FOR YOU TO DO YOUR WORK, TAKE CARE OF THINGS AT HOME, OR GET ALONG WITH OTHER PEOPLE: NOT DIFFICULT AT ALL
7. FEELING AFRAID AS IF SOMETHING AWFUL MIGHT HAPPEN: NOT AT ALL
3. WORRYING TOO MUCH ABOUT DIFFERENT THINGS: NOT AT ALL
6. BECOMING EASILY ANNOYED OR IRRITABLE: NOT AT ALL
2. NOT BEING ABLE TO STOP OR CONTROL WORRYING: NOT AT ALL
GAD7 TOTAL SCORE: 0
5. BEING SO RESTLESS THAT IT IS HARD TO SIT STILL: NOT AT ALL
1. FEELING NERVOUS, ANXIOUS, OR ON EDGE: NOT AT ALL

## 2020-05-13 ASSESSMENT — PATIENT HEALTH QUESTIONNAIRE - PHQ9
SUM OF ALL RESPONSES TO PHQ QUESTIONS 1-9: 3
5. POOR APPETITE OR OVEREATING: NOT AT ALL

## 2020-05-13 ASSESSMENT — PAIN SCALES - GENERAL: PAINLEVEL: NO PAIN (0)

## 2020-05-13 NOTE — PROGRESS NOTES
"Karin Santacruz is a 50 year old female who is being evaluated via a billable video visit.      The patient has been notified of following:     \"This video visit will be conducted via a call between you and your physician/provider. We have found that certain health care needs can be provided without the need for an in-person physical exam.  This service lets us provide the care you need with a video conversation.  If a prescription is necessary we can send it directly to your pharmacy.  If lab work is needed we can place an order for that and you can then stop by our lab to have the test done at a later time.    Video visits are billed at different rates depending on your insurance coverage.  Please reach out to your insurance provider with any questions.    If during the course of the call the physician/provider feels a video visit is not appropriate, you will not be charged for this service.\"    Patient has given verbal consent for Video visit? Yes    How would you like to obtain your AVS? Whitleyhar    Patient would like the video invitation sent by: Send to e-mail at: [email or web address]    Will anyone else be joining your video visit? No    Subjective     Karin Santacruz is a 50 year old female who presents today via video visit for the following health issues:    HPI   1.  Blood pressure  At last visit in 2/2020, had been high. Has checked bp outside office, running 130's/low 70.      2. Leg pain/groin pull  For last 2 months,  when leans down to put socks/shoes on and flexes and externally rotates hip on R side, has pain in groin, sharp, shoots down medially down to knee area. Pain lasts few minutes, resolves quickly when changes poisiton. Does not occur at other times. No numb/tingling/weakness.  No trauma to area, nothing else makes better or worse.  Walking 3 miles 4x/wke, , maybe 1x/wk yoga  1st month quarantine did little activity.    3. Weight  Weight only 2 lbs down from 2/2020, " "disappointed  Discussed difficulty of trying to lose weight during current pandemic events  Has changed eating pattern: eliminate red meat, increased water intake, start eat 12 noon stop at 8pm    {  Patient Active Problem List   Diagnosis     Family history of early CAD     Family history of renal artery stenosis     Elevated BP without diagnosis of hypertension     Past Surgical History:   Procedure Laterality Date      SECTION  1999      SECTION         Social History     Tobacco Use     Smoking status: Former Smoker     Smokeless tobacco: Never Used   Substance Use Topics     Alcohol use: Yes     Comment: occ     Family History   Problem Relation Age of Onset     Breast Cancer Mother      Melanoma Mother      Angina Father      Hypertension Father            Reviewed and updated as needed this visit by Provider         Review of Systems   CONSTITUTIONAL: NEGATIVE for fever, chills, change in weight  ENT/MOUTH: NEGATIVE for ear, mouth and throat problems  RESP: NEGATIVE for significant cough or SOB  CV: NEGATIVE for chest pain, palpitations or peripheral edema      Objective    There were no vitals taken for this visit.  Estimated body mass index is 31.93 kg/m  as calculated from the following:    Height as of 20: 1.651 m (5' 5\").    Weight as of 20: 87 kg (191 lb 14.4 oz).  Physical Exam     GENERAL: Healthy, alert and no distress  EYES: Eyes grossly normal to inspection.  No discharge or erythema, or obvious scleral/conjunctival abnormalities.  RESP: No audible wheeze, cough, or visible cyanosis.  No visible retractions or increased work of breathing.    SKIN: Visible skin clear. No significant rash, abnormal pigmentation or lesions.  NEURO: Cranial nerves grossly intact.  Mentation and speech appropriate for age.  PSYCH: Mentation appears normal, affect normal/bright, judgement and insight intact, normal speech and appearance well-groomed.  ROM in R hip appears intact    A/P  1. " Elevated BP  BP appear within normal range  -Discussed parameters for normal vs. High blood pressure with patient, and reasons why blood pressure often increases with age. Recommend continuing to monitor bp as recommended by screening guidelines, continue heart healthy diet.  2. R hip flexor tendinopathy  Refer to PT, can be virtual  3. Counseling on weight   Mindful of eating  --continue walking, can try increase pace  --understand unusual times to try life changes; recommended wt loss 3 lbs/month if perfect  --watch simple carbs.  --Be gentle with self.    Follow-up 4-6 months.       Video-Visit Details    Type of service:  Video Visit    Video endTime: 9:51 AM  Start 9:20      Originating Location (pt. Location): Home    Distant Location (provider location):  [facility]     Platform used for Video Visit: Doxyjuan Manning MD

## 2020-05-14 ASSESSMENT — ANXIETY QUESTIONNAIRES: GAD7 TOTAL SCORE: 0

## 2020-05-29 ENCOUNTER — VIRTUAL VISIT (OUTPATIENT)
Dept: PHYSICAL THERAPY | Facility: CLINIC | Age: 50
End: 2020-05-29
Attending: FAMILY MEDICINE
Payer: COMMERCIAL

## 2020-05-29 DIAGNOSIS — R10.31 GROIN PAIN, RIGHT: ICD-10-CM

## 2020-05-29 DIAGNOSIS — M25.551 HIP PAIN, RIGHT: ICD-10-CM

## 2020-05-29 DIAGNOSIS — S76.011A STRAIN OF RIGHT HIP ADDUCTOR MUSCLE, INITIAL ENCOUNTER: ICD-10-CM

## 2020-05-29 PROCEDURE — 97110 THERAPEUTIC EXERCISES: CPT | Mod: 95 | Performed by: PHYSICAL THERAPIST

## 2020-08-14 ENCOUNTER — TELEPHONE (OUTPATIENT)
Dept: GASTROENTEROLOGY | Facility: CLINIC | Age: 50
End: 2020-08-14

## 2020-08-14 NOTE — TELEPHONE ENCOUNTER
1st recall attempt    Procedure:  Colonoscopy     Purpose of Procedure:  Screening     Does the patient have the following?  None     Is the patient on the following medications?  None             Associated Diagnoses     Special screening for malignant neoplasms, colon [Z12.11]  - Primary

## 2020-08-20 ENCOUNTER — RECORDS - HEALTHEAST (OUTPATIENT)
Dept: LAB | Facility: CLINIC | Age: 50
End: 2020-08-20

## 2020-08-20 DIAGNOSIS — Z11.59 ENCOUNTER FOR SCREENING FOR OTHER VIRAL DISEASES: Primary | ICD-10-CM

## 2020-08-23 LAB — BACTERIA SPEC CULT: NORMAL

## 2020-08-26 PROBLEM — M25.551 HIP PAIN, RIGHT: Status: RESOLVED | Noted: 2020-05-29 | Resolved: 2020-08-26

## 2020-08-26 PROBLEM — S76.011A STRAIN OF RIGHT HIP ADDUCTOR MUSCLE: Status: RESOLVED | Noted: 2020-05-29 | Resolved: 2020-08-26

## 2020-08-26 NOTE — PROGRESS NOTES
"Patient seen for one time evaluation and treatment.  Patient did not return for further treatment and current status is unknown.  Please see initial evaluation for further information.      Answers for HPI/ROS submitted by the patient on 5/28/2020   History Reported by Patient  Reason for Visit:: pain in groin area - right upper inner thigh - that shoots down when I move it  When problem began:: 3/1/2020  How problem occurred:: no idea - don't remember an \"all of thje sudden\" event  Number scale: 6/10  General health as reported by patient: good  Please check all that apply to your current or past medical history: overweight  Medical allergies: none  Surgeries: none  Medications you are currently taking: anti-depressants, other  Other Meds Detail: Vitamin D 10K mil  Occupation::  & Manager  What are your primary job tasks: computer work, driving, prolonged sitting    "

## 2020-08-31 DIAGNOSIS — Z12.11 SPECIAL SCREENING FOR MALIGNANT NEOPLASMS, COLON: Primary | ICD-10-CM

## 2020-08-31 RX ORDER — BISACODYL 5 MG/1
10 TABLET, DELAYED RELEASE ORAL DAILY
Qty: 4 TABLET | Refills: 0 | Status: SHIPPED | OUTPATIENT
Start: 2020-08-31 | End: 2020-09-02

## 2020-08-31 NOTE — NURSING NOTE
eRx Dulcolax et Golytely: [facility] [id number] [place] [facility] - [place], [place] - [address] [place] [address] SIVAKUMAR Mehta RN   [facility]

## 2020-09-04 DIAGNOSIS — Z11.59 ENCOUNTER FOR SCREENING FOR OTHER VIRAL DISEASES: ICD-10-CM

## 2020-09-04 PROCEDURE — U0003 INFECTIOUS AGENT DETECTION BY NUCLEIC ACID (DNA OR RNA); SEVERE ACUTE RESPIRATORY SYNDROME CORONAVIRUS 2 (SARS-COV-2) (CORONAVIRUS DISEASE [COVID-19]), AMPLIFIED PROBE TECHNIQUE, MAKING USE OF HIGH THROUGHPUT TECHNOLOGIES AS DESCRIBED BY CMS-2020-01-R: HCPCS | Performed by: INTERNAL MEDICINE

## 2020-09-05 LAB
SARS-COV-2 RNA SPEC QL NAA+PROBE: NOT DETECTED
SPECIMEN SOURCE: NORMAL

## 2020-09-08 ENCOUNTER — ANESTHESIA EVENT (OUTPATIENT)
Dept: GASTROENTEROLOGY | Facility: CLINIC | Age: 50
End: 2020-09-08
Payer: COMMERCIAL

## 2020-09-08 ENCOUNTER — ANESTHESIA (OUTPATIENT)
Dept: GASTROENTEROLOGY | Facility: CLINIC | Age: 50
End: 2020-09-08
Payer: COMMERCIAL

## 2020-09-08 ENCOUNTER — HOSPITAL ENCOUNTER (OUTPATIENT)
Facility: CLINIC | Age: 50
Discharge: HOME | End: 2020-09-08
Attending: INTERNAL MEDICINE | Admitting: INTERNAL MEDICINE
Payer: COMMERCIAL

## 2020-09-08 VITALS
BODY MASS INDEX: 31.32 KG/M2 | HEIGHT: 65 IN | TEMPERATURE: 98.2 F | HEART RATE: 65 BPM | OXYGEN SATURATION: 98 % | DIASTOLIC BLOOD PRESSURE: 73 MMHG | WEIGHT: 188 LBS | RESPIRATION RATE: 11 BRPM | SYSTOLIC BLOOD PRESSURE: 115 MMHG

## 2020-09-08 LAB
COLONOSCOPY: NORMAL
COLONOSCOPY: NORMAL

## 2020-09-08 PROCEDURE — 25000132 ZZH RX MED GY IP 250 OP 250 PS 637: Performed by: INTERNAL MEDICINE

## 2020-09-08 PROCEDURE — 88305 TISSUE EXAM BY PATHOLOGIST: CPT | Performed by: INTERNAL MEDICINE

## 2020-09-08 PROCEDURE — 45380 COLONOSCOPY AND BIOPSY: CPT | Performed by: INTERNAL MEDICINE

## 2020-09-08 PROCEDURE — 25800030 ZZH RX IP 258 OP 636: Performed by: NURSE ANESTHETIST, CERTIFIED REGISTERED

## 2020-09-08 PROCEDURE — 37000008 ZZH ANESTHESIA TECHNICAL FEE, 1ST 30 MIN: Performed by: INTERNAL MEDICINE

## 2020-09-08 PROCEDURE — 25000128 H RX IP 250 OP 636: Performed by: NURSE ANESTHETIST, CERTIFIED REGISTERED

## 2020-09-08 PROCEDURE — G0500 MOD SEDAT ENDO SERVICE >5YRS: HCPCS | Performed by: INTERNAL MEDICINE

## 2020-09-08 PROCEDURE — 45378 DIAGNOSTIC COLONOSCOPY: CPT | Performed by: INTERNAL MEDICINE

## 2020-09-08 PROCEDURE — G0104 CA SCREEN;FLEXI SIGMOIDSCOPE: HCPCS | Performed by: INTERNAL MEDICINE

## 2020-09-08 PROCEDURE — 25000125 ZZHC RX 250: Performed by: NURSE ANESTHETIST, CERTIFIED REGISTERED

## 2020-09-08 PROCEDURE — 37000009 ZZH ANESTHESIA TECHNICAL FEE, EACH ADDTL 15 MIN: Performed by: INTERNAL MEDICINE

## 2020-09-08 PROCEDURE — 25000128 H RX IP 250 OP 636: Performed by: INTERNAL MEDICINE

## 2020-09-08 RX ORDER — FENTANYL CITRATE 50 UG/ML
INJECTION, SOLUTION INTRAMUSCULAR; INTRAVENOUS PRN
Status: DISCONTINUED | OUTPATIENT
Start: 2020-09-08 | End: 2020-09-09

## 2020-09-08 RX ORDER — DEXAMETHASONE SODIUM PHOSPHATE 4 MG/ML
INJECTION, SOLUTION INTRA-ARTICULAR; INTRALESIONAL; INTRAMUSCULAR; INTRAVENOUS; SOFT TISSUE PRN
Status: DISCONTINUED | OUTPATIENT
Start: 2020-09-08 | End: 2020-09-09

## 2020-09-08 RX ORDER — SODIUM CHLORIDE 9 MG/ML
INJECTION, SOLUTION INTRAVENOUS CONTINUOUS PRN
Status: DISCONTINUED | OUTPATIENT
Start: 2020-09-08 | End: 2020-09-09

## 2020-09-08 RX ORDER — ONDANSETRON 2 MG/ML
INJECTION INTRAMUSCULAR; INTRAVENOUS PRN
Status: DISCONTINUED | OUTPATIENT
Start: 2020-09-08 | End: 2020-09-09

## 2020-09-08 RX ORDER — SIMETHICONE
LIQUID (ML) MISCELLANEOUS PRN
Status: DISCONTINUED | OUTPATIENT
Start: 2020-09-08 | End: 2020-09-08 | Stop reason: HOSPADM

## 2020-09-08 RX ORDER — GLYCOPYRROLATE 0.2 MG/ML
INJECTION, SOLUTION INTRAMUSCULAR; INTRAVENOUS PRN
Status: DISCONTINUED | OUTPATIENT
Start: 2020-09-08 | End: 2020-09-09

## 2020-09-08 RX ORDER — FENTANYL CITRATE 50 UG/ML
INJECTION, SOLUTION INTRAMUSCULAR; INTRAVENOUS PRN
Status: DISCONTINUED | OUTPATIENT
Start: 2020-09-08 | End: 2020-09-08 | Stop reason: HOSPADM

## 2020-09-08 RX ORDER — PROPOFOL 10 MG/ML
INJECTION, EMULSION INTRAVENOUS CONTINUOUS PRN
Status: DISCONTINUED | OUTPATIENT
Start: 2020-09-08 | End: 2020-09-09

## 2020-09-08 RX ORDER — LIDOCAINE HYDROCHLORIDE 20 MG/ML
INJECTION, SOLUTION INFILTRATION; PERINEURAL PRN
Status: DISCONTINUED | OUTPATIENT
Start: 2020-09-08 | End: 2020-09-09

## 2020-09-08 RX ORDER — PROPOFOL 10 MG/ML
INJECTION, EMULSION INTRAVENOUS PRN
Status: DISCONTINUED | OUTPATIENT
Start: 2020-09-08 | End: 2020-09-09

## 2020-09-08 RX ADMIN — FENTANYL CITRATE 25 MCG: 50 INJECTION, SOLUTION INTRAMUSCULAR; INTRAVENOUS at 14:33

## 2020-09-08 RX ADMIN — FENTANYL CITRATE 25 MCG: 50 INJECTION, SOLUTION INTRAMUSCULAR; INTRAVENOUS at 14:38

## 2020-09-08 RX ADMIN — PROPOFOL 10 MG: 10 INJECTION, EMULSION INTRAVENOUS at 14:23

## 2020-09-08 RX ADMIN — MIDAZOLAM 2 MG: 1 INJECTION INTRAMUSCULAR; INTRAVENOUS at 14:15

## 2020-09-08 RX ADMIN — PROPOFOL 150 MCG/KG/MIN: 10 INJECTION, EMULSION INTRAVENOUS at 14:23

## 2020-09-08 RX ADMIN — GLYCOPYRROLATE 0.2 MG: 0.2 INJECTION, SOLUTION INTRAMUSCULAR; INTRAVENOUS at 14:26

## 2020-09-08 RX ADMIN — LIDOCAINE HYDROCHLORIDE 60 MG: 20 INJECTION, SOLUTION INFILTRATION; PERINEURAL at 14:23

## 2020-09-08 RX ADMIN — PROPOFOL 20 MG: 10 INJECTION, EMULSION INTRAVENOUS at 14:25

## 2020-09-08 RX ADMIN — DEXAMETHASONE SODIUM PHOSPHATE 4 MG: 4 INJECTION, SOLUTION INTRA-ARTICULAR; INTRALESIONAL; INTRAMUSCULAR; INTRAVENOUS; SOFT TISSUE at 14:25

## 2020-09-08 RX ADMIN — ONDANSETRON 4 MG: 2 INJECTION INTRAMUSCULAR; INTRAVENOUS at 15:02

## 2020-09-08 RX ADMIN — SODIUM CHLORIDE: 9 INJECTION, SOLUTION INTRAVENOUS at 14:10

## 2020-09-08 ASSESSMENT — MIFFLIN-ST. JEOR
SCORE: 1468.64
SCORE: 1468.64

## 2020-09-08 NOTE — ANESTHESIA PREPROCEDURE EVALUATION
"Anesthesia Pre-Procedure Evaluation    Patient: Karin DARBY Neeta   MRN:     [id number] Gender:   female   Age:    50 year old :      1970        Preoperative Diagnosis: Special screening for malignant neoplasms, colon [Z12.11]   Procedure(s):  COLONOSCOPY     LABS:  CBC: No results found for: WBC, HGB, HCT, PLT  BMP:   Lab Results   Component Value Date     2020    POTASSIUM 4.4 2020    CHLORIDE 109 2020    CO2 28 2020    BUN 10 2020    CR 0.64 2020    GLC 79 2020     COAGS: No results found for: PTT, INR, FIBR  POC: No results found for: BGM, HCG, HCGS  OTHER:   Lab Results   Component Value Date    CONSTANTINO 8.7 2020    TSH 1.44 2020        Preop Vitals    BP Readings from Last 3 Encounters:   20 135/81   20 133/76   20 (!) 145/86    Pulse Readings from Last 3 Encounters:   20 64   20 61      Resp Readings from Last 3 Encounters:   20 14    SpO2 Readings from Last 3 Encounters:   20 97%      Temp Readings from Last 1 Encounters:   20 36.8  C (98.2  F) (Oral)    Ht Readings from Last 1 Encounters:   20 1.651 m (5' 5\")      Wt Readings from Last 1 Encounters:   20 85.3 kg (188 lb)    Estimated body mass index is 31.28 kg/m  as calculated from the following:    Height as of this encounter: 1.651 m (5' 5\").    Weight as of this encounter: 85.3 kg (188 lb).     LDA:  Peripheral IV 20 Right (Active)   Number of days: 0        History reviewed. No pertinent past medical history.   Past Surgical History:   Procedure Laterality Date      SECTION        SECTION        No Known Allergies     Anesthesia Evaluation     .             ROS/MED HX    ENT/Pulmonary:  - neg pulmonary ROS     Neurologic:  - neg neurologic ROS     Cardiovascular:     (+) hypertension----. : . . . :. .       METS/Exercise Tolerance:     Hematologic:  - neg hematologic  ROS       Musculoskeletal:  - neg " musculoskeletal ROS       GI/Hepatic:         Renal/Genitourinary:  - ROS Renal section negative       Endo:  - neg endo ROS       Psychiatric:  - neg psychiatric ROS       Infectious Disease:  - neg infectious disease ROS       Malignancy:      - no malignancy   Other:    - neg other ROS                     PHYSICAL EXAM:   Mental Status/Neuro: A/A/O   Airway: Facies: Feasible  Mallampati: I  Mouth/Opening: Full  TM distance: > 6 cm  Neck ROM: Full   Respiratory: Auscultation: CTAB     Resp. Rate: Normal     Resp. Effort: Normal      CV: Rhythm: Regular  Rate: Age appropriate  Heart: Normal Sounds  Edema: None   Comments:      Dental: Normal Dentition                Assessment:   ASA SCORE: 2    H&P: History and physical reviewed and following examination; no interval change.   Smoking Status:  Non-Smoker/Unknown   NPO Status: NPO Appropriate     Plan:   Anes. Type:  MAC   Pre-Medication: None   Induction:  IV (Standard)   Airway: Native Airway   Access/Monitoring: PIV   Maintenance: Propofol Sedation     Postop Plan:   Postop Pain: None  Postop Sedation/Airway: Not planned     PONV Management:   Adult Risk Factors: Female, Non-Smoker   Prevention: Ondansetron, Propofol     CONSENT: Direct conversation   Plan and risks discussed with: Patient   Blood Products: Consent Deferred (Minimal Blood Loss)                   Armando Disla DO

## 2020-09-08 NOTE — ANESTHESIA CARE TRANSFER NOTE
Patient: Karin DARBY Neeta    Procedure(s):  COLONOSCOPY    Diagnosis: Special screening for malignant neoplasms, colon [Z12.11]  Diagnosis Additional Information: No value filed.    Anesthesia Type:   MAC     Note:  Airway :Room Air  Patient transferred to:Phase II  Comments: Pt denies pain or nausea. Report given to RN. Handoff Report: Identifed the Patient, Identified the Reponsible Provider, Reviewed the pertinent medical history, Discussed the surgical course, Reviewed Intra-OP anesthesia mangement and issues during anesthesia, Set expectations for post-procedure period and Allowed opportunity for questions and acknowledgement of understanding      Vitals: (Last set prior to Anesthesia Care Transfer)    CRNA VITALS  9/8/2020 1435 - 9/8/2020 1505      9/8/2020             Pulse:  63    Ht Rate:  63    SpO2:  97 %    Resp Rate (observed):  (!) 2                Electronically Signed By: RANDOLPH Johnson CRNA  September 8, 2020  3:05 PM

## 2020-09-08 NOTE — OR NURSING
Colonoscopy attempted, pt unable to tolerate adult scope, switched to peds scope. Pt unable to tolerate peds scope, procedure stopped. Plan is to completed procedure with MAC.

## 2020-09-08 NOTE — DISCHARGE INSTRUCTIONS
Discharge Instructions after Colonoscopy  or Sigmoidoscopy    Today you had a  Colonoscopy     Activity and Diet  You were given medicine for pain. You may be dizzy or sleepy.  For 24 hours:    Do not drive or use heavy equipment.    Do not make important decisions.    Do not drink any alcohol.  You may return to your normal diet and medicines.    Discomfort    Air was placed in your colon during the exam in order to see it. Walking helps to pass the air.    You may take Tylenol (acetaminophen) for pain unless your doctor has told you not to.  Do not take aspirin or ibuprofen (Advil, Motrin, or other anti-inflammatory  drugs) for 3 days.    Follow-up    We took small tissue samples or polyps to study. Your doctor will call you with the results  within two weeks.    When to call:    Call right away if you have:    Unusual pain in belly or chest pain not relieved with passing air.    More than 1 to 2 Tablespoons of bleeding from your rectum.    Fever above 100.6  F (37.5  C).    If you have severe pain, bleeding, or shortness of breath, go to an emergency room.    If you have questions, call:  Monday to Friday, 7 a.m. to 4:30 p.m.  Endoscopy: [phone] (We may have to call you back)    After hours  Hospital: [phone] (Ask for the GI fellow on call)   Time (Mins): 1 Post Peel Care: The peel was neutralized with sodium bicarbonate.  After the procedure, the treatment area was washed with soap and water, and a post-peel cream was applied. Sun protection and post-care instructions were reviewed with the patient. Glycolic Acid %: 30% Post-Care Instructions: I reviewed with the patient in detail post-care instructions. Patient should avoid sun exposure and wear sun protection. Detail Level: Zone Consent: Prior to the procedure, written consent was obtained and risks were reviewed, including but not limited to: redness, peeling, blistering, pigmentary change, scarring, infection, and pain. Prep: The treated area was degreased with pre-peel cleanser, and vaseline was applied for protection of mucous membranes. Treatment Number: 0

## 2020-09-08 NOTE — ANESTHESIA POSTPROCEDURE EVALUATION
Anesthesia POST Procedure Evaluation    Patient: Karin Santacruz   MRN:     [id number] Gender:   female   Age:    50 year old :      1970        Preoperative Diagnosis: Special screening for malignant neoplasms, colon [Z12.11]   Procedure(s):  Colonoscopy, With Polypectomy And Biopsy   Postop Comments: No value filed.     Anesthesia Type: MAC          Postop Pain Control: Uneventful            Sign Out: Well controlled pain   PONV: No   Neuro/Psych: Uneventful            Sign Out: Acceptable/Baseline neuro status   Airway/Respiratory: Uneventful            Sign Out: Acceptable/Baseline resp. status   CV/Hemodynamics: Uneventful            Sign Out: Acceptable CV status   Other NRE: NONE   DID A NON-ROUTINE EVENT OCCUR? No         Last Anesthesia Record Vitals:  CRNA VITALS  2020 1439 - 2020 1511      2020             Pulse:  78    Ht Rate:  78    SpO2:  96 %          Last PACU Vitals:  Vitals Value Taken Time   BP 94/63 2020  3:08 PM   Temp     Pulse 68 2020  3:09 PM   Resp 17 2020  3:09 PM   SpO2 97 % 2020  3:09 PM   Temp src Available 2020  3:00 PM   NIBP 90/54 2020  3:04 PM   Pulse 78 2020  3:08 PM   SpO2 96 % 2020  3:08 PM   Resp     Temp     Ht Rate 78 2020  3:08 PM   Temp 2     Vitals shown include unvalidated device data.      Electronically Signed By: Armando Disla DO, 2020, 3:11 PM

## 2020-09-09 LAB — COPATH REPORT: NORMAL

## 2020-09-10 ENCOUNTER — TELEPHONE (OUTPATIENT)
Dept: GASTROENTEROLOGY | Facility: CLINIC | Age: 50
End: 2020-09-10

## 2020-09-10 NOTE — TELEPHONE ENCOUNTER
I tried calling Karin a few times (today and on Tuesday) to see how she is feeling post-procedure and to review the endoscopic and path findings. I left voice messages with my call back number.

## 2020-09-17 ENCOUNTER — OFFICE VISIT (OUTPATIENT)
Dept: DERMATOLOGY | Facility: CLINIC | Age: 50
End: 2020-09-17
Payer: COMMERCIAL

## 2020-09-17 DIAGNOSIS — L82.0 SEBORRHEIC KERATOSIS, INFLAMED: ICD-10-CM

## 2020-09-17 DIAGNOSIS — Z80.8 FAMILY HISTORY OF MELANOMA: ICD-10-CM

## 2020-09-17 DIAGNOSIS — D48.9 NEOPLASM OF UNCERTAIN BEHAVIOR: Primary | ICD-10-CM

## 2020-09-17 DIAGNOSIS — D22.9 MULTIPLE BENIGN NEVI: ICD-10-CM

## 2020-09-17 ASSESSMENT — PAIN SCALES - GENERAL: PAINLEVEL: NO PAIN (0)

## 2020-09-17 NOTE — NURSING NOTE
Dermatology Rooming Note    Karin Santacruz's goals for this visit include:   Chief Complaint   Patient presents with     Skin Check     Karin is here today for a skin check and skin tags      RENUKA Haq

## 2020-09-17 NOTE — LETTER
2020       RE: Karin Santacruz  [address] [place] [address]     Dear Colleague,    Thank you for referring your patient, Karin Santacruz, to the [facility] DERMATOLOGY at [facility]. Please see a copy of my visit note below.    [facility] Dermatology Note      Dermatology Problem List:  1. Family history melanoma (mother).  # NUBs:  - L mid back, s/p shave bx 2020  - L inferior breast, s/p shave bx 2020    CC:   Chief Complaint   Patient presents with     Skin Check     Karin is here today for a skin check and skin tags          Encounter Date: Sep 17, 2020    History of Present Illness:  Ms. Karin Santacruz is a 50 year old female who presents for evaluation of skin check and skin tags. Her mom had a history of melanoma. Patient has had some moles removed in the  past but they were not abnormal. She notes raised spots on her eyelids and under her breasts which are annoying to her.   No other painful, bleeding, non-healing, or otherwise symptomatic lesions.   No personal hx of skin cancer.  Otherwise feeling well, no additional skin concerns.     Past Medical History:   Patient Active Problem List   Diagnosis     Family history of early CAD     Family history of renal artery stenosis     Elevated BP without diagnosis of hypertension     No past medical history on file.  Past Surgical History:   Procedure Laterality Date      SECTION        SECTION       COLONOSCOPY N/A 2020    Procedure: COLONOSCOPY;  Surgeon: Hyun Perdomo MD;  Location:  GI     COLONOSCOPY N/A 2020    Procedure: Colonoscopy, With Polypectomy And Biopsy;  Surgeon: Hyun Perdomo MD;  Location:  GI       Social History:  Patient reports that she has quit smoking. She has never used smokeless tobacco. She reports current alcohol use. She reports that she does not use drugs.    Family History:  Family History   Problem  Relation Age of Onset     Breast Cancer Mother      Melanoma Mother      Angina Father      Hypertension Father        Medications:  Current Outpatient Medications   Medication Sig Dispense Refill     escitalopram 10 MG PO tablet        Vitamin D3 (CHOLECALCIFEROL) 25 mcg (1000 units) tablet Take 1 tablet (1,000 Units) by mouth daily Take one tablet daily. 90 tablet 3     ferrous sulfate (SLO-FE) 142 (45 Fe) MG CR tablet Take 142 mg by mouth daily       No Known Allergies      Review of Systems:  -Constitutional: Otherwise feeling well today, in usual state of health.  -Skin: As above in HPI. No additional skin concerns.    Physical exam:  GEN: This is a well developed, well-nourished female in no acute distress, in a pleasant mood.    SKIN: Full skin, which includes the head/face, both arms, chest, back, abdomen,both legs, genitalia and/or groin buttocks, digits and/or nails, was examined.  -Pineda skin type: II-III  -Left mid back and left inferior breast with irregular pigmented lesions.  -Multiple regular brown pigmented macules and papules are identified on the trunk and extremities.   -There are waxy stuck on tan to brown papules on the eyelids and inferior breasts and back.  -No other lesions of concern on areas examined.     Impression/Plan:  1. NUBs:  - L mid back, s/p shave bx 9/17/2020 - ddx dysplastic nevus v lentigo  - L inferior breast, s/p shave bx 9/17/2020 - ddx dysplastic nevus v lentigo  - Shave biopsy (x2):  After discussion of benefits and risks including but not limited to bleeding/bruising, pain/swelling, infection, scar, incomplete removal, nerve damage/numbness, recurrence, and non-diagnostic biopsy, written consent, verbal consent and photographs were obtained. Time-out was performed. The area was cleaned with isopropyl alcohol. 0.5ml of 1% lidocaine with 1:100,000 epinephrine was injected to obtain adequate anesthesia. A shave biopsy was performed. Hemostasis was achieved with  aluminium chloride. Vaseline and a sterile dressing were applied. The patient tolerated the procedure and no complications were noted. The patient was provided with verbal and written post care instructions.    2. Seborrheic keratoses. Benign nature discussed. Given symptomatic nature of lesions on inferior breasts, will treat with cryo today. She would ideally like lesions on eyelids treated as well but given upcoming dinner tonight, she declined treatment. She will call if she would like to come in for this at a later date.  - Cryotherapy procedure note: After verbal consent and discussion of risks and benefits including but no limited to dyspigmentation/scar, blister, and pain, 6 was(were) treated with 1-2mm freeze border for 2 cycles with liquid nitrogen. Post cryotherapy instructions were provided.    3. Multiple benign nevi. Counseled on ABCDEs of melanoma and sun protection. Asked patient to return sooner if noticing changing or symptomatic lesions.    4. Family history of melanoma.   - Continue photoprotection  - Continue yearly skin exams  - Advised to monitor for changing, non-healing, bleeding, painful, changing, or otherwise symptomatic lesions      Follow-up in 1 year, sooner pending bx results or sooner if would like SKs on eyelids treated, earlier for new or changing lesions.       Staff Involved:  Staff Only    Paty Marsh MD    Department of Dermatology  [facility]: Phone: [phone], Fax: [phone]  9/18/2020

## 2020-09-17 NOTE — PATIENT INSTRUCTIONS
Skin looks great!  We will check one spot on back.    Seborrheic keratosis = benign thickenings of skin  We can freeze with liquid nitrogen.  Let us know when you want to freeze the ones on the eyelids.    The ABCDEs of Melanoma  Asymmetry, Border (irregularity), Color (not uniform, changes in color), Diameter (greater than 6 mm which is about the size of a pencil eraser), and Evolving (any changes in preexisting moles)    Skin cancer can develop anywhere on the skin. Ask someone for help when checking your skin, especially in hard to see places. If you notice a mole different from others, or that changes, enlarges, itches, or bleeds (even if it is small), you should see a dermatologist.      Wound Care After a Biopsy    What is a skin biopsy?  A skin biopsy allows the doctor to examine a very small piece of tissue under the microscope to determine the diagnosis and the best treatment for the skin condition. A local anesthetic (numbing medicine)  is injected with a very small needle into the skin area to be tested. A small piece of skin is taken from the area. Sometimes a suture (stitch) is used.     What are the risks of a skin biopsy?  I will experience scar, bleeding, swelling, pain, crusting and redness. I may experience incomplete removal or recurrence. Risks of this procedure are excessive bleeding, bruising, infection, nerve damage, numbness, thick (hypertrophic or keloidal) scar and non-diagnostic biopsy.    How should I care for my wound for the first 24 hours?    Keep the wound dry and covered for 24 hours    If it bleeds, hold direct pressure on the area for 15 minutes. If bleeding does not stop then go to the emergency room    Avoid strenuous exercise the first 1-2 days or as your doctor instructs you    How should I care for the wound after 24 hours?    After 24 hours, remove the bandage    You may bathe or shower as normal    If you had a scalp biopsy, you can shampoo as usual and can use shower water  to clean the biopsy site daily    Clean the wound twice a day with gentle soap and water    Do not scrub, be gentle    Apply white petroleum/Vaseline after cleaning the wound with a cotton swab or a clean finger, and keep the site covered with a Bandaid /bandage. Bandages are not necessary with a scalp biopsy    If you are unable to cover the site with a Bandaid /bandage, re-apply ointment 2-3 times a day to keep the site moist. Moisture will help with healing    Avoid strenuous activity for first 1-2 days    Avoid lakes, rivers, pools, and oceans until the stitches are removed or the site is healed    How do I clean my wound?    Wash hands thoroughly with soap or use hand  before all wound care    Clean the wound with gentle soap and water    Apply white petroleum/Vaseline  to wound after it is clean    Replace the Bandaid /bandage to keep the wound covered for the first few days or as instructed by your doctor    If you had a scalp biopsy, warm shower water to the area on a daily basis should suffice    What should I use to clean my wound?     Cotton-tipped applicators (Qtips )    White petroleum jelly (Vaseline ). Use a clean new container and use Q-tips to apply.    Bandaids   as needed    Gentle soap     How should I care for my wound long term?    Do not get your wound dirty    Keep up with wound care for one week or until the area is healed.    A small scab will form and fall off by itself when the area is completely healed. The area will be red and will become pink in color as it heals. Sun protection is very important for how your scar will turn out. Sunscreen with an SPF 30 or greater is recommended once the area is healed.    If you have stitches, stitches need to be removed in 14 days. You may return to our clinic for this or you may have it done locally at your doctor s office.    You should have some soreness but it should be mild and slowly go away over several days. Talk to your doctor about  using tylenol for pain,    When should I call my doctor?  If you have increased:     Pain or swelling    Pus or drainage (clear or slightly yellow drainage is ok)    Temperature over 100F    Spreading redness or warmth around wound    When will I hear about my results?  The biopsy results can take 2-3 weeks to come back. The clinic will call you with the results, send you a [employer] message, or have you schedule a follow-up clinic or phone time to discuss the results. Contact our clinics if you do not hear from us in 3 weeks.     Cryotherapy    What is it?    Use of a very cold liquid, such as liquid nitrogen, to freeze and destroy abnormal skin cells that need to be removed    What should I expect?    Tenderness and redness    A small blister that might grow and fill with dark purple blood. There may be crusting.    More than one treatment may be needed if the lesions do not go away.    How do I care for the treated area?    Gently wash the area with your hands when bathing.    Use a thin layer of Vaseline to help with healing. You may use a Band-Aid.     The area should heal within 7-10 days and may leave behind a pink or lighter color.     Do not use an antibiotic or Neosporin ointment.     You may take acetaminophen (Tylenol) for pain.     Call your Doctor if you have:    Severe pain    Signs of infection (warmth, redness, cloudy yellow drainage, and or a bad smell)    Questions or concerns    Who should I call with questions?       [facility]: [phone]       [facility]: [phone]       For urgent needs outside of business hours call the [facility] at [phone]        and ask for the dermatology resident on call      Who should I call with questions?    [facility]: [phone]     [facility]: [phone]    For urgent needs outside of business hours call the [facility]  [facility] at [phone] and ask for the dermatology resident on call

## 2020-09-18 NOTE — PROGRESS NOTES
[facility] Dermatology Note      Dermatology Problem List:  1. Family history melanoma (mother).  # NUBs:  - L mid back, s/p shave bx 2020  - L inferior breast, s/p shave bx 2020    CC:   Chief Complaint   Patient presents with     Skin Check     Karin is here today for a skin check and skin tags          Encounter Date: Sep 17, 2020    History of Present Illness:  Ms. Karin Santacruz is a 50 year old female who presents for evaluation of skin check and skin tags. Her mom had a history of melanoma. Patient has had some moles removed in the  past but they were not abnormal. She notes raised spots on her eyelids and under her breasts which are annoying to her.   No other painful, bleeding, non-healing, or otherwise symptomatic lesions.   No personal hx of skin cancer.  Otherwise feeling well, no additional skin concerns.     Past Medical History:   Patient Active Problem List   Diagnosis     Family history of early CAD     Family history of renal artery stenosis     Elevated BP without diagnosis of hypertension     No past medical history on file.  Past Surgical History:   Procedure Laterality Date      SECTION        SECTION       COLONOSCOPY N/A 2020    Procedure: COLONOSCOPY;  Surgeon: Hyun Perdomo MD;  Location:  GI     COLONOSCOPY N/A 2020    Procedure: Colonoscopy, With Polypectomy And Biopsy;  Surgeon: Hyun Perdomo MD;  Location:  GI       Social History:  Patient reports that she has quit smoking. She has never used smokeless tobacco. She reports current alcohol use. She reports that she does not use drugs.    Family History:  Family History   Problem Relation Age of Onset     Breast Cancer Mother      Melanoma Mother      Angina Father      Hypertension Father        Medications:  Current Outpatient Medications   Medication Sig Dispense Refill     escitalopram 10 MG PO tablet        Vitamin D3 (CHOLECALCIFEROL) 25 mcg (1000 units)  tablet Take 1 tablet (1,000 Units) by mouth daily Take one tablet daily. 90 tablet 3     ferrous sulfate (SLO-FE) 142 (45 Fe) MG CR tablet Take 142 mg by mouth daily       No Known Allergies      Review of Systems:  -Constitutional: Otherwise feeling well today, in usual state of health.  -Skin: As above in HPI. No additional skin concerns.    Physical exam:  GEN: This is a well developed, well-nourished female in no acute distress, in a pleasant mood.    SKIN: Full skin, which includes the head/face, both arms, chest, back, abdomen,both legs, genitalia and/or groin buttocks, digits and/or nails, was examined.  -Pineda skin type: II-III  -Left mid back and left inferior breast with irregular pigmented lesions.  -Multiple regular brown pigmented macules and papules are identified on the trunk and extremities.   -There are waxy stuck on tan to brown papules on the eyelids and inferior breasts and back.  -No other lesions of concern on areas examined.     Impression/Plan:  1. NUBs:  - L mid back, s/p shave bx 9/17/2020 - ddx dysplastic nevus v lentigo  - L inferior breast, s/p shave bx 9/17/2020 - ddx dysplastic nevus v lentigo  - Shave biopsy (x2):  After discussion of benefits and risks including but not limited to bleeding/bruising, pain/swelling, infection, scar, incomplete removal, nerve damage/numbness, recurrence, and non-diagnostic biopsy, written consent, verbal consent and photographs were obtained. Time-out was performed. The area was cleaned with isopropyl alcohol. 0.5ml of 1% lidocaine with 1:100,000 epinephrine was injected to obtain adequate anesthesia. A shave biopsy was performed. Hemostasis was achieved with aluminium chloride. Vaseline and a sterile dressing were applied. The patient tolerated the procedure and no complications were noted. The patient was provided with verbal and written post care instructions.    2. Seborrheic keratoses. Benign nature discussed. Given symptomatic nature of  lesions on inferior breasts, will treat with cryo today. She would ideally like lesions on eyelids treated as well but given upcoming dinner tonight, she declined treatment. She will call if she would like to come in for this at a later date.  - Cryotherapy procedure note: After verbal consent and discussion of risks and benefits including but no limited to dyspigmentation/scar, blister, and pain, 6 was(were) treated with 1-2mm freeze border for 2 cycles with liquid nitrogen. Post cryotherapy instructions were provided.    3. Multiple benign nevi. Counseled on ABCDEs of melanoma and sun protection. Asked patient to return sooner if noticing changing or symptomatic lesions.    4. Family history of melanoma.   - Continue photoprotection  - Continue yearly skin exams  - Advised to monitor for changing, non-healing, bleeding, painful, changing, or otherwise symptomatic lesions      Follow-up in 1 year, sooner pending bx results or sooner if would like SKs on eyelids treated, earlier for new or changing lesions.       Staff Involved:  Staff Only    Paty Marsh MD    Department of Dermatology  [facility]: Phone: [phone], Fax: [phone]  9/18/2020

## 2020-09-23 LAB — COPATH REPORT: NORMAL

## 2020-11-29 ENCOUNTER — HEALTH MAINTENANCE LETTER (OUTPATIENT)
Age: 50
End: 2020-11-29

## 2021-01-15 ENCOUNTER — VIRTUAL VISIT (OUTPATIENT)
Dept: FAMILY MEDICINE | Facility: CLINIC | Age: 51
End: 2021-01-15
Attending: FAMILY MEDICINE
Payer: COMMERCIAL

## 2021-01-15 DIAGNOSIS — I10 BENIGN ESSENTIAL HYPERTENSION: Primary | ICD-10-CM

## 2021-01-15 PROCEDURE — 99213 OFFICE O/P EST LOW 20 MIN: CPT | Mod: 95 | Performed by: FAMILY MEDICINE

## 2021-01-15 RX ORDER — LISINOPRIL 2.5 MG/1
2.5 TABLET ORAL DAILY
Qty: 90 TABLET | Refills: 0 | Status: SHIPPED | OUTPATIENT
Start: 2021-01-15 | End: 2021-04-09

## 2021-01-15 NOTE — LETTER
1/15/2021       RE: Karin Santacruz  [address] [place] [address]     Dear Colleague,    Thank you for referring your patient, Karin Santacruz, to the [facility] [place] at [facility]. Please see a copy of my visit note below.    The patient has been notified of following:       Patient has given verbal consent for Video visit? Yes    Patient would like the video invitation sent by: Send to e-mail at:     Video Start Time: 8:41 AM         Subjective       Karin is a 50 year old individual who presents today for the following   health issues: elevated blood pressure, weight loss counseling    She has been tracking blood pressure at home since last visit; averaging 130's/85  Stopped tracking around June.  Went to donate blood, and BP was 158/90  Bought new monitor, 3 BP's were 120-140's/77-90--all done in succession, first one highest, average 137/85      She has lost 9 lbs and doing 150 minutes per week exercise for last 6 weeks: treadmill, fast walk x 40 min, strength/mobility through on line [employer].    Taking Vitamin D 1000 units, Magnesium, slo iron , Co Q 10, probiotic  Current Outpatient Medications   Medication     escitalopram 10 MG PO tablet     Vitamin D3 (CHOLECALCIFEROL) 25 mcg (1000 units) tablet     No current facility-administered medications for this visit.            Review of Systems  CONSTITUTIONAL: NEGATIVE for fever, chills, change in weight    Objective   There were no vitals taken for this visit.  There is no height or weight on file to calculate BMI.    Physical Exam  GENERAL: healthy, alert and no distress  RESP: speaks easily in complete sentences    PSYCH: mentation appears normal, affect normal/bright    Reviewed note from last visit  BMP 2/21/2020 within normal    Assessment & Plan   Problem List Items Addressed This Visit     None      Visit Diagnoses     Benign essential hypertension    -  Primary    Relevant  Medications    lisinopril (ZESTRIL) 2.5 MG tablet         Discussed treatment options with patient, including starting medication or re-evaluating after 3-6 months of further non drug interventions. Pt elects start medication, can review if still needed in future  Counseled on medication options, start Start lisinopril 2.5, discussed potential side effects,   Continue excellent life changes  Follow-up March for annual exam                 Heron Manning MD  [facility]  Video-Visit Details    Type of service:  Video Visit    Video End Time (time video stopped): 900    Originating Location (pt. Location): Home    Distant Location (provider location):  [facility]     Mode of Communication:  Video Conference via video platform: Lavon Manning MD        [facility]  Questions were elicited and answered.

## 2021-01-15 NOTE — PROGRESS NOTES
The patient has been notified of following:       Patient has given verbal consent for Video visit? Yes    Patient would like the video invitation sent by: Send to e-mail at:     Video Start Time: 8:41 AM         Donn Frazier is a 50 year old individual who presents today for the following   health issues: elevated blood pressure, weight loss counseling    She has been tracking blood pressure at home since last visit; averaging 130's/85  Stopped tracking around June.  Went to donate blood, and BP was 158/90  Bought new monitor, 3 BP's were 120-140's/77-90--all done in succession, first one highest, average 137/85      She has lost 9 lbs and doing 150 minutes per week exercise for last 6 weeks: treadmill, fast walk x 40 min, strength/mobility through on line [employer].    Taking Vitamin D 1000 units, Magnesium, slo iron , Co Q 10, probiotic  Current Outpatient Medications   Medication     escitalopram 10 MG PO tablet     Vitamin D3 (CHOLECALCIFEROL) 25 mcg (1000 units) tablet     No current facility-administered medications for this visit.            Review of Systems  CONSTITUTIONAL: NEGATIVE for fever, chills, change in weight    Objective   There were no vitals taken for this visit.  There is no height or weight on file to calculate BMI.    Physical Exam  GENERAL: healthy, alert and no distress  RESP: speaks easily in complete sentences    PSYCH: mentation appears normal, affect normal/bright    Reviewed note from last visit  BMP 2/21/2020 within normal    Assessment & Plan   Problem List Items Addressed This Visit     None      Visit Diagnoses     Benign essential hypertension    -  Primary    Relevant Medications    lisinopril (ZESTRIL) 2.5 MG tablet         Discussed treatment options with patient, including starting medication or re-evaluating after 3-6 months of further non drug interventions. Pt elects start medication, can review if still needed in future  Counseled on medication options, start Start  lisinopril 2.5, discussed potential side effects,   Continue excellent life changes  Follow-up March for annual exam                 Heron Manning MD  [facility]  Video-Visit Details    Type of service:  Video Visit    Video End Time (time video stopped): 900    Originating Location (pt. Location): Home    Distant Location (provider location):  [facility]     Mode of Communication:  Video Conference via video platform: Lavon Manning MD        Results by [facility]  Questions were elicited and answered.

## 2021-02-24 ENCOUNTER — OFFICE VISIT (OUTPATIENT)
Dept: FAMILY MEDICINE | Facility: CLINIC | Age: 51
End: 2021-02-24
Attending: FAMILY MEDICINE
Payer: COMMERCIAL

## 2021-02-24 VITALS
BODY MASS INDEX: 30.95 KG/M2 | DIASTOLIC BLOOD PRESSURE: 85 MMHG | HEART RATE: 67 BPM | WEIGHT: 186 LBS | TEMPERATURE: 98.4 F | SYSTOLIC BLOOD PRESSURE: 126 MMHG

## 2021-02-24 DIAGNOSIS — R09.81 SINUS CONGESTION: Primary | ICD-10-CM

## 2021-02-24 DIAGNOSIS — Z12.31 VISIT FOR SCREENING MAMMOGRAM: ICD-10-CM

## 2021-02-24 PROCEDURE — 77067 SCR MAMMO BI INCL CAD: CPT | Mod: GC

## 2021-02-24 PROCEDURE — 77063 BREAST TOMOSYNTHESIS BI: CPT | Mod: GC

## 2021-02-24 PROCEDURE — 99213 OFFICE O/P EST LOW 20 MIN: CPT | Performed by: FAMILY MEDICINE

## 2021-02-24 ASSESSMENT — PAIN SCALES - GENERAL: PAINLEVEL: SEVERE PAIN (6)

## 2021-02-24 NOTE — LETTER
"2/24/2021       RE: Karin Santacruz  [address] [place] [address]     Dear Colleague,    Thank you for referring your patient, Karin Santacruz, to the [facility] [place] at [facility]. Please see a copy of my visit note below.        Subjective   Karin is a 51 year old who presents for the following health issues     HPI   No symptoms until 8 days ago, developed pain/headache across frontal area--dull aching, constant.  Worse with movement, bending forward, pain extended into maxillary areas, bilaterally with tooth pain carmel on L side  No congestion, drainage. No change in smell  No change taste.  No sore throat, ear ache  Taking OTC sinus medication, oral, initial helped, not anymore  No fever      Review of Systems   As noted above      Objective    /85   Pulse 67   Temp 98.4  F (36.9  C) (Oral)   Wt 84.4 kg (186 lb)   LMP 02/14/2021   Breastfeeding No   BMI 30.95 kg/m    Body mass index is 30.95 kg/m .  Physical Exam   GENERAL APPEARANCE: healthy, alert and no distress  HENT: ear canals and TM's normal and nose and mouth without ulcers or lesions  R nare--mild edema, no drainage  Tender frontal and maxillary sinuses to palpation bilaterally      A/P  1. Sinus Congestion    Recommend use Neti Pot 1-3x/daily; depending on type, should use distilled water  Drink plenty of fluids  Warm compresses to face, warm/steamy showers  Guaifenesin (\"Mucinex\") over the counter (pill or liquid)  If severe congestion, nasal decongestant spray 2 puffs per nostril daily x 3 days MAXIMUM --do not use more than 3 days   Call if fever, prurulent drainage, increasing pain    Follow-up if not improving, will re evaluate for anitbiotic    Again, thank you for allowing me to participate in the care of your patient.      Sincerely,    Heron Manning MD      "

## 2021-02-24 NOTE — PROGRESS NOTES
"    Donn Frazier is a 51 year old who presents for the following health issues     HPI   No symptoms until 8 days ago, developed pain/headache across frontal area--dull aching, constant.  Worse with movement, bending forward, pain extended into maxillary areas, bilaterally with tooth pain carmel on L side  No congestion, drainage. No change in smell  No change taste.  No sore throat, ear ache  Taking OTC sinus medication, oral, initial helped, not anymore  No fever      Review of Systems   As noted above      Objective    /85   Pulse 67   Temp 98.4  F (36.9  C) (Oral)   Wt 84.4 kg (186 lb)   LMP 02/14/2021   Breastfeeding No   BMI 30.95 kg/m    Body mass index is 30.95 kg/m .  Physical Exam   GENERAL APPEARANCE: healthy, alert and no distress  HENT: ear canals and TM's normal and nose and mouth without ulcers or lesions  R nare--mild edema, no drainage  Tender frontal and maxillary sinuses to palpation bilaterally      A/P  1. Sinus Congestion    Recommend use Neti Pot 1-3x/daily; depending on type, should use distilled water  Drink plenty of fluids  Warm compresses to face, warm/steamy showers  Guaifenesin (\"Mucinex\") over the counter (pill or liquid)  If severe congestion, nasal decongestant spray 2 puffs per nostril daily x 3 days MAXIMUM --do not use more than 3 days   Call if fever, prurulent drainage, increasing pain    Follow-up if not improving, will re evaluate for anitbiotic  "

## 2021-03-08 ENCOUNTER — ANCILLARY PROCEDURE (OUTPATIENT)
Dept: MAMMOGRAPHY | Facility: CLINIC | Age: 51
End: 2021-03-08
Attending: FAMILY MEDICINE
Payer: COMMERCIAL

## 2021-03-08 DIAGNOSIS — R92.8 ABNORMAL MAMMOGRAM OF LEFT BREAST: ICD-10-CM

## 2021-03-08 PROCEDURE — 77065 DX MAMMO INCL CAD UNI: CPT | Performed by: RADIOLOGY

## 2021-03-08 PROCEDURE — G0279 TOMOSYNTHESIS, MAMMO: HCPCS | Performed by: RADIOLOGY

## 2021-03-08 PROCEDURE — 76642 ULTRASOUND BREAST LIMITED: CPT | Mod: LT | Performed by: RADIOLOGY

## 2021-03-30 NOTE — RESULT ENCOUNTER NOTE
Dear Karin,     Here are your recent results. Ultrasound showed benign appearing cyst    Please let us know if you have any questions or concerns.    Regards,  Heron Manning MD

## 2021-04-08 ENCOUNTER — MYC MEDICAL ADVICE (OUTPATIENT)
Dept: FAMILY MEDICINE | Facility: CLINIC | Age: 51
End: 2021-04-08

## 2021-04-08 DIAGNOSIS — I10 BENIGN ESSENTIAL HYPERTENSION: ICD-10-CM

## 2021-04-09 ENCOUNTER — IMMUNIZATION (OUTPATIENT)
Dept: NURSING | Facility: CLINIC | Age: 51
End: 2021-04-09
Payer: COMMERCIAL

## 2021-04-09 PROCEDURE — 0001A PR COVID VAC PFIZER DIL RECON 30 MCG/0.3 ML IM: CPT

## 2021-04-09 PROCEDURE — 91300 PR COVID VAC PFIZER DIL RECON 30 MCG/0.3 ML IM: CPT

## 2021-04-09 RX ORDER — LISINOPRIL 2.5 MG/1
2.5 TABLET ORAL DAILY
Qty: 90 TABLET | Refills: 0 | Status: SHIPPED | OUTPATIENT
Start: 2021-04-09 | End: 2021-07-21

## 2021-04-09 NOTE — TELEPHONE ENCOUNTER
Refill request received for lisinopril. Last in clinic 02/2021, pt states she plans on scheduling follow up with Dr. Manning. Short term refill sent.     [employer]GoOut message sent to patient to inform her this was done and to encourage her to schedule.

## 2021-04-10 ENCOUNTER — HEALTH MAINTENANCE LETTER (OUTPATIENT)
Age: 51
End: 2021-04-10

## 2021-04-30 ENCOUNTER — IMMUNIZATION (OUTPATIENT)
Dept: NURSING | Facility: CLINIC | Age: 51
End: 2021-04-30
Attending: INTERNAL MEDICINE
Payer: COMMERCIAL

## 2021-04-30 PROCEDURE — 0002A PR COVID VAC PFIZER DIL RECON 30 MCG/0.3 ML IM: CPT

## 2021-04-30 PROCEDURE — 91300 PR COVID VAC PFIZER DIL RECON 30 MCG/0.3 ML IM: CPT

## 2021-05-25 ENCOUNTER — RECORDS - HEALTHEAST (OUTPATIENT)
Dept: ADMINISTRATIVE | Facility: CLINIC | Age: 51
End: 2021-05-25

## 2021-07-21 ENCOUNTER — OFFICE VISIT (OUTPATIENT)
Dept: FAMILY MEDICINE | Facility: CLINIC | Age: 51
End: 2021-07-21
Attending: FAMILY MEDICINE
Payer: COMMERCIAL

## 2021-07-21 VITALS
DIASTOLIC BLOOD PRESSURE: 83 MMHG | HEIGHT: 65 IN | HEART RATE: 70 BPM | SYSTOLIC BLOOD PRESSURE: 137 MMHG | BODY MASS INDEX: 30.82 KG/M2 | WEIGHT: 185 LBS

## 2021-07-21 DIAGNOSIS — I10 BENIGN ESSENTIAL HYPERTENSION: ICD-10-CM

## 2021-07-21 DIAGNOSIS — E55.9 VITAMIN D DEFICIENCY: ICD-10-CM

## 2021-07-21 DIAGNOSIS — Z13.220 LIPID SCREENING: Primary | ICD-10-CM

## 2021-07-21 DIAGNOSIS — F33.42 MAJOR DEPRESSIVE DISORDER, RECURRENT EPISODE, IN FULL REMISSION (H): ICD-10-CM

## 2021-07-21 LAB
ANION GAP SERPL CALCULATED.3IONS-SCNC: 1 MMOL/L (ref 3–14)
BUN SERPL-MCNC: 9 MG/DL (ref 7–30)
CALCIUM SERPL-MCNC: 8.8 MG/DL (ref 8.5–10.1)
CHLORIDE BLD-SCNC: 109 MMOL/L (ref 94–109)
CHOLEST SERPL-MCNC: 229 MG/DL
CO2 SERPL-SCNC: 27 MMOL/L (ref 20–32)
CREAT SERPL-MCNC: 0.72 MG/DL (ref 0.52–1.04)
FASTING STATUS PATIENT QL REPORTED: YES
GFR SERPL CREATININE-BSD FRML MDRD: >90 ML/MIN/1.73M2
GLUCOSE BLD-MCNC: 81 MG/DL (ref 70–99)
HDLC SERPL-MCNC: 52 MG/DL
LDLC SERPL CALC-MCNC: 155 MG/DL
NONHDLC SERPL-MCNC: 177 MG/DL
POTASSIUM BLD-SCNC: 4.3 MMOL/L (ref 3.4–5.3)
SODIUM SERPL-SCNC: 137 MMOL/L (ref 133–144)
TRIGL SERPL-MCNC: 109 MG/DL

## 2021-07-21 PROCEDURE — 82306 VITAMIN D 25 HYDROXY: CPT | Performed by: FAMILY MEDICINE

## 2021-07-21 PROCEDURE — 80061 LIPID PANEL: CPT | Performed by: FAMILY MEDICINE

## 2021-07-21 PROCEDURE — 36415 COLL VENOUS BLD VENIPUNCTURE: CPT | Performed by: FAMILY MEDICINE

## 2021-07-21 PROCEDURE — 99214 OFFICE O/P EST MOD 30 MIN: CPT | Performed by: FAMILY MEDICINE

## 2021-07-21 PROCEDURE — 82374 ASSAY BLOOD CARBON DIOXIDE: CPT | Performed by: FAMILY MEDICINE

## 2021-07-21 PROCEDURE — G0463 HOSPITAL OUTPT CLINIC VISIT: HCPCS

## 2021-07-21 RX ORDER — ESCITALOPRAM OXALATE 10 MG/1
10 TABLET ORAL DAILY
Qty: 90 TABLET | Refills: 1 | Status: SHIPPED | OUTPATIENT
Start: 2021-07-21 | End: 2022-02-03

## 2021-07-21 RX ORDER — LISINOPRIL 2.5 MG/1
2.5 TABLET ORAL DAILY
Qty: 90 TABLET | Refills: 3 | Status: SHIPPED | OUTPATIENT
Start: 2021-07-21 | End: 2022-08-05

## 2021-07-21 ASSESSMENT — PATIENT HEALTH QUESTIONNAIRE - PHQ9
SUM OF ALL RESPONSES TO PHQ QUESTIONS 1-9: 1
5. POOR APPETITE OR OVEREATING: NOT AT ALL

## 2021-07-21 ASSESSMENT — ANXIETY QUESTIONNAIRES
IF YOU CHECKED OFF ANY PROBLEMS ON THIS QUESTIONNAIRE, HOW DIFFICULT HAVE THESE PROBLEMS MADE IT FOR YOU TO DO YOUR WORK, TAKE CARE OF THINGS AT HOME, OR GET ALONG WITH OTHER PEOPLE: NOT DIFFICULT AT ALL
7. FEELING AFRAID AS IF SOMETHING AWFUL MIGHT HAPPEN: NOT AT ALL
5. BEING SO RESTLESS THAT IT IS HARD TO SIT STILL: NOT AT ALL
6. BECOMING EASILY ANNOYED OR IRRITABLE: NOT AT ALL
GAD7 TOTAL SCORE: 0
3. WORRYING TOO MUCH ABOUT DIFFERENT THINGS: NOT AT ALL
1. FEELING NERVOUS, ANXIOUS, OR ON EDGE: NOT AT ALL
2. NOT BEING ABLE TO STOP OR CONTROL WORRYING: NOT AT ALL

## 2021-07-21 ASSESSMENT — MIFFLIN-ST. JEOR: SCORE: 1450.03

## 2021-07-21 ASSESSMENT — PAIN SCALES - GENERAL: PAINLEVEL: NO PAIN (0)

## 2021-07-21 NOTE — LETTER
"7/21/2021       RE: Karin Santacruz  [address] [place] [address]     Dear Colleague,    Thank you for referring your patient, Karin Santacruz, to the [facility] [place] at [facility]. Please see a copy of my visit note below.      Subjective   Karin is a 51 year old who presents for the following health issues     HPI   Here for follow-up HTN  Started on lisinpril 2.5 mg daily  No problems with medication  BP at home 120's/70--none over 130 SBP    Mindful with exercise: powerwalking, cardio and mobility video  150 minutes of \"active zone HR\" per week, 8000 steps/day min  Started Noom for diet help  Feel more in control, feel happier about body    Corrects family history: sister did not have renal artery stenosis, had adrenal gland problem; mother had renal artery stenosis.     Still doing iron, OTC vit D, magneisium, CoQ    2. MDD, full remission   No ready to go off Citaloprom  Had been  Vitamin  D deficient last year, repeat level   PHQ 5/13/2020 7/21/2021   PHQ-9 Total Score 3 1   Q9: Thoughts of better off dead/self-harm past 2 weeks Not at all Not at all     GUNNER-7 SCORE 2/21/2020 5/13/2020 7/21/2021   Total Score 0 0 0         Review of Systems   Per HPI      Objective    /83   Pulse 70   Ht 1.651 m (5' 5\")   Wt 83.9 kg (185 lb)   LMP 07/01/2021   Breastfeeding No   BMI 30.79 kg/m    Body mass index is 30.79 kg/m .  Physical Exam   EXAM:  Constitutional: healthy, alert and no distress   Psychiatric: Speech RRR, mood is euthymic no psychomotor changes, thought process is appropriate,   Affect is normal. No evidence of suicidality. \        A/P  1. HTN  At goal with current low dose lisinopril  Significant health improvements with diet and activity  Check BMP, Lipids    2. MDD in full remission  Continue current dose citalopram, repeat Vit D    Schedule annual exam preventive care by fall     Again, thank you for " allowing me to participate in the care of your patient.      Sincerely,    Heron aMnning MD

## 2021-07-21 NOTE — PROGRESS NOTES
"  Donn Frazier is a 51 year old who presents for the following health issues     HPI   Here for follow-up HTN  Started on lisinpril 2.5 mg daily  No problems with medication  BP at home 120's/70--none over 130 SBP    Mindful with exercise: powerwalking, cardio and mobility video  150 minutes of \"active zone HR\" per week, 8000 steps/day min  Started Noom for diet help  Feel more in control, feel happier about body    Corrects family history: sister did not have renal artery stenosis, had adrenal gland problem; mother had renal artery stenosis.     Still doing iron, OTC vit D, magneisium, CoQ    2. MDD, full remission   No ready to go off Citaloprom  Had been  Vitamin  D deficient last year, repeat level   PHQ 5/13/2020 7/21/2021   PHQ-9 Total Score 3 1   Q9: Thoughts of better off dead/self-harm past 2 weeks Not at all Not at all     GUNNER-7 SCORE 2/21/2020 5/13/2020 7/21/2021   Total Score 0 0 0         Review of Systems   Per HPI      Objective    /83   Pulse 70   Ht 1.651 m (5' 5\")   Wt 83.9 kg (185 lb)   LMP 07/01/2021   Breastfeeding No   BMI 30.79 kg/m    Body mass index is 30.79 kg/m .  Physical Exam   EXAM:  Constitutional: healthy, alert and no distress   Psychiatric: Speech RRR, mood is euthymic no psychomotor changes, thought process is appropriate,   Affect is normal. No evidence of suicidality. \        A/P  1. HTN  At goal with current low dose lisinopril  Significant health improvements with diet and activity  Check BMP, Lipids    2. MDD in full remission  Continue current dose citalopram, repeat Vit D    Schedule annual exam preventive care by fall     "

## 2021-07-22 LAB — DEPRECATED CALCIDIOL+CALCIFEROL SERPL-MC: 34 UG/L (ref 20–75)

## 2021-07-22 ASSESSMENT — ANXIETY QUESTIONNAIRES: GAD7 TOTAL SCORE: 0

## 2021-07-24 ENCOUNTER — MYC MEDICAL ADVICE (OUTPATIENT)
Dept: FAMILY MEDICINE | Facility: CLINIC | Age: 51
End: 2021-07-24

## 2021-09-19 ENCOUNTER — HEALTH MAINTENANCE LETTER (OUTPATIENT)
Age: 51
End: 2021-09-19

## 2022-02-03 DIAGNOSIS — F33.42 MAJOR DEPRESSIVE DISORDER, RECURRENT EPISODE, IN FULL REMISSION (H): ICD-10-CM

## 2022-02-03 RX ORDER — ESCITALOPRAM OXALATE 10 MG/1
10 TABLET ORAL DAILY
Qty: 90 TABLET | Refills: 0 | Status: SHIPPED | OUTPATIENT
Start: 2022-02-03 | End: 2022-05-03

## 2022-02-03 NOTE — TELEPHONE ENCOUNTER
escitalopram (LEXAPRO) 10 MG tablet    Last Written Prescription Date: 7/21/21  Last Fill Quantity: 90,   # refills: 1  Last Office Visit :7/21/21  Future Office visit:  2/16/22      Routing refill request to provider for review/approval because:phq9 past due.

## 2022-02-11 ENCOUNTER — OFFICE VISIT (OUTPATIENT)
Dept: INTERNAL MEDICINE | Facility: CLINIC | Age: 52
End: 2022-02-11
Attending: INTERNAL MEDICINE
Payer: COMMERCIAL

## 2022-02-11 VITALS — SYSTOLIC BLOOD PRESSURE: 139 MMHG | HEART RATE: 74 BPM | DIASTOLIC BLOOD PRESSURE: 89 MMHG

## 2022-02-11 DIAGNOSIS — G89.29 CHRONIC PAIN OF LEFT KNEE: Primary | ICD-10-CM

## 2022-02-11 DIAGNOSIS — M25.562 LEFT KNEE PAIN: Primary | ICD-10-CM

## 2022-02-11 DIAGNOSIS — M25.562 CHRONIC PAIN OF LEFT KNEE: Primary | ICD-10-CM

## 2022-02-11 PROCEDURE — 99203 OFFICE O/P NEW LOW 30 MIN: CPT | Performed by: INTERNAL MEDICINE

## 2022-02-11 PROCEDURE — G0463 HOSPITAL OUTPT CLINIC VISIT: HCPCS

## 2022-02-11 ASSESSMENT — PAIN SCALES - GENERAL: PAINLEVEL: MILD PAIN (2)

## 2022-02-11 NOTE — PROGRESS NOTES
"  Assessment & Plan     Chronic pain of left knee  Discussed possible causes. Will evaluate for DVT with ultrasound. Patient was also referred to Sports Medicine for consultation.   - Orthopedic  Referral  - US Lower Extremity Non Vascular Left; Future  - US Lower Extremity Venous Duplex Left; Future      I spent a total of 30 minutes on the day of the visit.   Time spent doing chart review, history and exam, documentation and further activities per the note       BMI:   Estimated body mass index is 30.79 kg/m  as calculated from the following:    Height as of 7/21/21: 1.651 m (5' 5\").    Weight as of 7/21/21: 83.9 kg (185 lb).           No follow-ups on file.    Janessa Condon MD  [facility] TIM Frazier is a 52 year old who presents for the following health issues     HPI       Patient reports that she has been dealing with pain behnd her left knee for several months. She reports that bending it makes pain worse. She does not remember any injuries or exercises that triggered the pain. It is not present when she is waking up in the morning, however, starts to be aware of it later in the morning. She has tried ibuprofen wuth no relief. She described pain is pulsating. She denies numbness or tingling in her leg.     Review of Systems   Constitutional, HEENT, cardiovascular, pulmonary, GI, , musculoskeletal, neuro, skin, endocrine and psych systems are negative, except as otherwise noted.      Objective    /89   Pulse 74   LMP 01/03/2022   Breastfeeding No   There is no height or weight on file to calculate BMI.  Physical Exam   GENERAL: healthy, alert and no distress  EYES: Eyes grossly normal to inspection, PERRL and conjunctivae and sclerae normal  NECK: no adenopathy, no asymmetry, masses, or scars and thyroid normal to palpation  RESP: normal effort, no wheezing  CV: regular rates and rhythm and no peripheral edema  MS: tenderness on palpation " of the left posterior lateral knee, no distinct mass, no redness or increase in local warmth

## 2022-02-11 NOTE — LETTER
"2/11/2022       RE: Karin Santacruz  [address] [place] [address]     Dear Colleague,    Thank you for referring your patient, Karin Santacruz, to the [facility] at [facility]. Please see a copy of my visit note below.    Chief Complaint   Patient presents with     Establish Care     C/O tendon/ muscle behind knee   Lisa Arreguin LPN      Assessment & Plan     Chronic pain of left knee  Discussed possible causes. Will evaluate for DVT with ultrasound. Patient was also referred to Sports Medicine for consultation.   - Orthopedic  Referral  - US Lower Extremity Non Vascular Left; Future  - US Lower Extremity Venous Duplex Left; Future      I spent a total of 30 minutes on the day of the visit.   Time spent doing chart review, history and exam, documentation and further activities per the note       BMI:   Estimated body mass index is 30.79 kg/m  as calculated from the following:    Height as of 7/21/21: 1.651 m (5' 5\").    Weight as of 7/21/21: 83.9 kg (185 lb).           No follow-ups on file.    Janessa Condon MD  [facility]    Donn Frazier is a 52 year old who presents for the following health issues     HPI       Patient reports that she has been dealing with pain behnd her left knee for several months. She reports that bending it makes pain worse. She does not remember any injuries or exercises that triggered the pain. It is not present when she is waking up in the morning, however, starts to be aware of it later in the morning. She has tried ibuprofen wuth no relief. She described pain is pulsating. She denies numbness or tingling in her leg.     Review of Systems   Constitutional, HEENT, cardiovascular, pulmonary, GI, , musculoskeletal, neuro, skin, endocrine and psych systems are negative, except as otherwise noted.      Objective    /89   Pulse 74 "   LMP 01/03/2022   Breastfeeding No   There is no height or weight on file to calculate BMI.  Physical Exam   GENERAL: healthy, alert and no distress  EYES: Eyes grossly normal to inspection, PERRL and conjunctivae and sclerae normal  NECK: no adenopathy, no asymmetry, masses, or scars and thyroid normal to palpation  RESP: normal effort, no wheezing  CV: regular rates and rhythm and no peripheral edema  MS: tenderness on palpation of the left posterior lateral knee, no distinct mass, no redness or increase in local warmth

## 2022-02-11 NOTE — PROGRESS NOTES
Chief Complaint   Patient presents with     Establish Care     C/O tendon/ muscle behind knee   Lisa KRISTY Arreguin

## 2022-02-14 ENCOUNTER — ANCILLARY PROCEDURE (OUTPATIENT)
Dept: ULTRASOUND IMAGING | Facility: CLINIC | Age: 52
End: 2022-02-14
Attending: INTERNAL MEDICINE
Payer: COMMERCIAL

## 2022-02-14 DIAGNOSIS — G89.29 CHRONIC PAIN OF LEFT KNEE: ICD-10-CM

## 2022-02-14 DIAGNOSIS — M25.562 CHRONIC PAIN OF LEFT KNEE: ICD-10-CM

## 2022-02-14 PROCEDURE — 93971 EXTREMITY STUDY: CPT | Mod: LT | Performed by: RADIOLOGY

## 2022-02-14 NOTE — TELEPHONE ENCOUNTER
DIAGNOSIS: Chronic pain of left knee  / Dr. Willy Condon / [facility] (XR ordered)   APPOINTMENT DATE: 2.16.22   NOTES STATUS DETAILS   OFFICE NOTE from referring provider Internal 2.11.22 Dr Janessa Condon,[facility] IM   MEDICATION LIST Internal    ULTRASOUND Internal 2.14.22 US Lower Extremity

## 2022-02-16 ENCOUNTER — OFFICE VISIT (OUTPATIENT)
Dept: ORTHOPEDICS | Facility: CLINIC | Age: 52
End: 2022-02-16
Attending: INTERNAL MEDICINE
Payer: COMMERCIAL

## 2022-02-16 ENCOUNTER — ANCILLARY PROCEDURE (OUTPATIENT)
Dept: GENERAL RADIOLOGY | Facility: CLINIC | Age: 52
End: 2022-02-16
Attending: FAMILY MEDICINE
Payer: COMMERCIAL

## 2022-02-16 ENCOUNTER — PRE VISIT (OUTPATIENT)
Dept: ORTHOPEDICS | Facility: CLINIC | Age: 52
End: 2022-02-16

## 2022-02-16 VITALS — HEIGHT: 65 IN | BODY MASS INDEX: 30.82 KG/M2 | WEIGHT: 185 LBS

## 2022-02-16 DIAGNOSIS — M25.562 CHRONIC PAIN OF LEFT KNEE: Primary | ICD-10-CM

## 2022-02-16 DIAGNOSIS — M25.562 LEFT KNEE PAIN: ICD-10-CM

## 2022-02-16 DIAGNOSIS — G89.29 CHRONIC PAIN OF LEFT KNEE: Primary | ICD-10-CM

## 2022-02-16 PROCEDURE — 73562 X-RAY EXAM OF KNEE 3: CPT | Mod: LT | Performed by: RADIOLOGY

## 2022-02-16 PROCEDURE — 99203 OFFICE O/P NEW LOW 30 MIN: CPT | Performed by: FAMILY MEDICINE

## 2022-02-16 NOTE — LETTER
2/16/2022      RE: Karin Santacruz  [address] [place] [address]         [facility] SUR[facility]  SPORTS & ORTHOPEDIC CLINIC VISIT     Feb 16, 2022        ASSESSMENT & PLAN    52-year-old with popliteal pain for the last 3 months of undetermined etiology.  The area of pain and tenderness poorly localized with any specific musculoskeletal structure.  Structurally her knee seems very sound without precipitation of pain of a ligamentous, cartilaginous, or meniscal etiology.    Reviewed imaging and assessment with patient in detail  Given the duration of pain and the odd location plan to evaluate further with MRI for visualization of soft tissues.  She will follow-up virtually or in person afterwards at her discretion    Lewis Coyle MD  [facility] SPORTS MEDICINE [facility]    -----  Chief Complaint   Patient presents with     Consult     left knee       SUBJECTIVE  Karin Santacruz is a/an 52 year old female who is seen in consultation at the request of  Janessa Condon M.D. for evaluation of left knee pain.     The patient is seen by themselves.  The patient is Right handed    Onset: 3 month(s) ago. Reports insidious onset without acute precipitating event.  Location of Pain: left knee, posterior aspect  Worsened by: bending, direct pressure to the posterior aspect of left knee.  Better with: ibuprofen  Treatments tried: rest/activity avoidance, elevation, ice, heat and ibuprofen  Associated symptoms: swelling and pain.    Orthopedic/Surgical history: NO  Social History/Occupation: Leadership Training      REVIEW OF SYSTEMS:    Do you have fever, chills, weight loss? No    Do you have any vision problems? No    Do you have any chest pain or edema? No    Do you have any shortness of breath or wheezing?  No    Do you have stomach problems? No    Do you have any numbness or focal weakness? No    Do you have diabetes? No    Do you have problems with bleeding or clotting?  "No    Do you have an rashes or other skin lesions? No    OBJECTIVE:  Ht 1.651 m (5' 5\")   Wt 83.9 kg (185 lb)   BMI 30.79 kg/m       Patient is alert, No acute distress, pleasant and conversational.    Gait: nonantalgic. Normal heel toe gait.      left knee:   Skin intact. No erythema or ecchymosis.  No effusion or soft tissue swelling.    AROM: Zero to approximately 135  without restriction or reported pain.    Palpation: No medial or lateral facet joint tenderness.  No posterior medial or posterior lateral joint line tenderness   TTP superior to the popliteal area in the posterior thigh, very point specific area.  No nodularity or defect palpated in this area.    Special Tests:  Negative bounce test, negative forced flexion and negative Saroj's.  No ligamentous laxity or pain with valgus or varus stress.  Negative Lachman's, Anterior Drawer and Posterior Drawer     Full Isometric quad strength, extensor mechanism in place     Neurovascularly intact in the lower extremity    Hip and Ankle with full AROM and nontender      RADIOLOGY:    3 view xrays of left knee performed and reviewed independently demonstrating no acute fracture dislocation.  No significant DJD see EMR for formal radiology report.         Lewis Coyle MD    "

## 2022-02-16 NOTE — PROGRESS NOTES
"  [facility] CLINICS AND SURGERY CENTER  SPORTS & ORTHOPEDIC CLINIC VISIT     Feb 16, 2022        ASSESSMENT & PLAN    52-year-old with popliteal pain for the last 3 months of undetermined etiology.  The area of pain and tenderness poorly localized with any specific musculoskeletal structure.  Structurally her knee seems very sound without precipitation of pain of a ligamentous, cartilaginous, or meniscal etiology.    Reviewed imaging and assessment with patient in detail  Given the duration of pain and the odd location plan to evaluate further with MRI for visualization of soft tissues.  She will follow-up virtually or in person afterwards at her discretion    Lewis Coyle MD  [facility] SPORTS MEDICINE [facility]    -----  Chief Complaint   Patient presents with     Consult     left knee       SUBJECTIVE  Karin Santacruz is a/an 52 year old female who is seen in consultation at the request of  Janessa Condon M.D. for evaluation of left knee pain.     The patient is seen by themselves.  The patient is Right handed    Onset: 3 month(s) ago. Reports insidious onset without acute precipitating event.  Location of Pain: left knee, posterior aspect  Worsened by: bending, direct pressure to the posterior aspect of left knee.  Better with: ibuprofen  Treatments tried: rest/activity avoidance, elevation, ice, heat and ibuprofen  Associated symptoms: swelling and pain.    Orthopedic/Surgical history: NO  Social History/Occupation: Leadership Training      REVIEW OF SYSTEMS:    Do you have fever, chills, weight loss? No    Do you have any vision problems? No    Do you have any chest pain or edema? No    Do you have any shortness of breath or wheezing?  No    Do you have stomach problems? No    Do you have any numbness or focal weakness? No    Do you have diabetes? No    Do you have problems with bleeding or clotting? No    Do you have an rashes or other skin lesions? No    OBJECTIVE:   1.651 m (5' 5\")  "  Wt 83.9 kg (185 lb)   BMI 30.79 kg/m       Patient is alert, No acute distress, pleasant and conversational.    Gait: nonantalgic. Normal heel toe gait.      left knee:   Skin intact. No erythema or ecchymosis.  No effusion or soft tissue swelling.    AROM: Zero to approximately 135  without restriction or reported pain.    Palpation: No medial or lateral facet joint tenderness.  No posterior medial or posterior lateral joint line tenderness   TTP superior to the popliteal area in the posterior thigh, very point specific area.  No nodularity or defect palpated in this area.    Special Tests:  Negative bounce test, negative forced flexion and negative Saroj's.  No ligamentous laxity or pain with valgus or varus stress.  Negative Lachman's, Anterior Drawer and Posterior Drawer     Full Isometric quad strength, extensor mechanism in place     Neurovascularly intact in the lower extremity    Hip and Ankle with full AROM and nontender      RADIOLOGY:    3 view xrays of left knee performed and reviewed independently demonstrating no acute fracture dislocation.  No significant DJD see EMR for formal radiology report.

## 2022-02-18 ENCOUNTER — ANCILLARY PROCEDURE (OUTPATIENT)
Dept: MRI IMAGING | Facility: CLINIC | Age: 52
End: 2022-02-18
Attending: FAMILY MEDICINE
Payer: COMMERCIAL

## 2022-02-18 DIAGNOSIS — G89.29 CHRONIC PAIN OF LEFT KNEE: ICD-10-CM

## 2022-02-18 DIAGNOSIS — M25.562 CHRONIC PAIN OF LEFT KNEE: ICD-10-CM

## 2022-02-18 PROCEDURE — 73721 MRI JNT OF LWR EXTRE W/O DYE: CPT | Mod: LT | Performed by: RADIOLOGY

## 2022-02-28 ENCOUNTER — OFFICE VISIT (OUTPATIENT)
Dept: ORTHOPEDICS | Facility: CLINIC | Age: 52
End: 2022-02-28
Payer: COMMERCIAL

## 2022-02-28 DIAGNOSIS — G89.29 CHRONIC PAIN OF LEFT KNEE: Primary | ICD-10-CM

## 2022-02-28 DIAGNOSIS — M25.562 CHRONIC PAIN OF LEFT KNEE: Primary | ICD-10-CM

## 2022-02-28 PROCEDURE — 20610 DRAIN/INJ JOINT/BURSA W/O US: CPT | Mod: LT | Performed by: FAMILY MEDICINE

## 2022-02-28 RX ADMIN — TRIAMCINOLONE ACETONIDE 40 MG: 40 INJECTION, SUSPENSION INTRA-ARTICULAR; INTRAMUSCULAR at 09:42

## 2022-02-28 NOTE — LETTER
2/28/2022      RE: Karin Santacruz  [address] [place] [address]         [facility] CLINICS AND SURGERY CENTER  SPORTS & ORTHOPEDIC CLINIC VISIT     Feb 28, 2022        ASSESSMENT & PLAN    52-year-old with left knee pain in the posterior knee distal hamstring area that has been somewhat curious in etiology.  She does have demonstration of meniscal injury on MRI and it is not clear that this directly correlates with her pain which seems slightly proximal to the posterior joint line.  However, no soft tissue injury or lesion was seen in the area of the patient marker on MRI.    Reviewed imaging and assessment with patient in detail  We discussed operative treatment.  At the current time she would like to try a steroid injection for both diagnostic and therapeutic purposes.  We additionally will pursue a course of physical therapy.  She will follow-up with us in roughly 4 to 6 weeks or sooner if there is no improvement or worsening.    Lewis Coyle MD  [facility] SPORTS MEDICINE CLINIC [place]    Face-to-face time:14 minutes  Record review time: 5Minutes  Documentation time: 2Minutes  Total time: 21 Minutes on day of visit independent procedure time    -----  Chief Complaint   Patient presents with     Left Knee - Pain       SUBJECTIVE  Karin Santacruz is a/an 52 year old female who is seen for follow up of left knee pain.     The patient is seen by themselves.    Date of injury: 4 months ago   Date of Last Visit: 2/16/22   Symptoms: worsened  Worsened by: Bending  Better with: NA  Treatment to date: rest/activity avoidance, elevation, ice, heat and ibuprofen  Associated symptoms: no distal numbness or tingling; denies swelling or warmth        REVIEW OF SYSTEMS:    See HPI     OBJECTIVE:  There were no vitals taken for this visit.     No further exam this visit    RADIOLOGY:    MRI of the left knee dated 2/18/2022.  Per radiology report:  Impression:  1. There is increased intrasubstance  signal in the posterior horn of  the medial meniscus, which does not communicate with the articular  surface consistent with mucoid degeneration  2. Small joint effusion.  3. Moderate to high-grade articular cartilage fissuring of the median  patellar ridge and lateral patellar facet, cartilage heterogeneity  within the trochlear cartilage.                 Large Joint Injection/Arthocentesis: L knee joint    Date/Time: 2/28/2022 9:42 AM  Performed by: Lewis Coyle MD  Authorized by: Lewis Coyle MD     Indications:  Osteoarthritis  Needle Size:  22 G  Guidance: landmark guided    Approach:  Anterolateral  Location:  Knee      Medications:  40 mg triamcinolone 40 MG/ML  Medications comment:  Xylocaine 1%   NDC 43805-406-70  LOT [id number]  EXP 10/1/25    4mL   Outcome:  Tolerated well, no immediate complications  Procedure discussed: discussed risks, benefits, and alternatives    Consent Given by:  Patient  Timeout: timeout called immediately prior to procedure    Prep: patient was prepped and draped in usual sterile fashion       There were no complications. The patient tolerated the procedure well. There was minimal bleeding.   The patient was instructed to ice the knee upon leaving clinic and refrain from overuse over the next 2 days.   The patient was instructed to go to the emergency room with any unusual pain, swelling, or redness occurred in the injected area.     Lewis Coyle MD

## 2022-02-28 NOTE — NURSING NOTE
[address] [address]  Dept: [phone]  ______________________________________________________________________________    Patient: Karin Santacruz   : 1970   MRN: [id number]   2022    INVASIVE PROCEDURE SAFETY CHECKLIST    Date: 2022   Procedure:L knee CSI   Patient Name: Karin Santacruz  MRN: [id number]  YOB: 1970    Action: Complete sections as appropriate. Any discrepancy results in a HARD COPY until resolved.     PRE PROCEDURE:  Patient ID verified with 2 identifiers (name and  or MRN): Yes  Procedure and site verified with patient/designee (when able): Yes  Accurate consent documentation in medical record: Yes  H&P (or appropriate assessment) documented in medical record: Yes  H&P must be up to 20 days prior to procedure and updates within 24 hours of procedure as applicable: NA  Relevant diagnostic and radiology test results appropriately labeled and displayed as applicable: Yes  Procedure site(s) marked with provider initials: NA    TIMEOUT:  Time-Out performed immediately prior to starting procedure, including verbal and active participation of all team members addressing the following:Yes  * Correct patient identify  * Confirmed that the correct side and site are marked  * An accurate procedure consent form  * Agreement on the procedure to be done  * Correct patient position  * Relevant images and results are properly labeled and appropriately displayed  * The need to administer antibiotics or fluids for irrigation purposes during the procedure as applicable   * Safety precautions based on patient history or medication use    DURING PROCEDURE: Verification of correct person, site, and procedures any time the responsibility for care of the patient is transferred to another member of the care team.       Prior to injection, verified patient identity using patient's name and date of birth.  Due  to injection administration, patient instructed to remain in clinic for 15 minutes  afterwards, and to report any adverse reaction to me immediately.    Joint injection was performed.      Drug Amount Wasted:  Yes: 16 mg/ml   Vial/Syringe: Single dose vial  Expiration Date:  10/1/25      Rosanna Coelho, ATC  February 28, 2022

## 2022-02-28 NOTE — PROGRESS NOTES
Large Joint Injection/Arthocentesis: L knee joint    Date/Time: 2/28/2022 9:42 AM  Performed by: Lewis Coyle MD  Authorized by: Lewis Coyle MD     Indications:  Osteoarthritis  Needle Size:  22 G  Guidance: landmark guided    Approach:  Anterolateral  Location:  Knee      Medications:  40 mg triamcinolone 40 MG/ML  Medications comment:  Xylocaine 1%   NDC 01345-460-23  LOT [id number]  EXP 10/1/25    4mL   Outcome:  Tolerated well, no immediate complications  Procedure discussed: discussed risks, benefits, and alternatives    Consent Given by:  Patient  Timeout: timeout called immediately prior to procedure    Prep: patient was prepped and draped in usual sterile fashion       There were no complications. The patient tolerated the procedure well. There was minimal bleeding.   The patient was instructed to ice the knee upon leaving clinic and refrain from overuse over the next 2 days.   The patient was instructed to go to the emergency room with any unusual pain, swelling, or redness occurred in the injected area.     Lewis Coyle MD

## 2022-02-28 NOTE — PROGRESS NOTES
[facility] AND SURGERY CENTER  SPORTS & ORTHOPEDIC CLINIC VISIT     Feb 28, 2022        ASSESSMENT & PLAN    52-year-old with left knee pain in the posterior knee distal hamstring area that has been somewhat curious in etiology.  She does have demonstration of meniscal injury on MRI and it is not clear that this directly correlates with her pain which seems slightly proximal to the posterior joint line.  However, no soft tissue injury or lesion was seen in the area of the patient marker on MRI.    Reviewed imaging and assessment with patient in detail  We discussed operative treatment.  At the current time she would like to try a steroid injection for both diagnostic and therapeutic purposes.  We additionally will pursue a course of physical therapy.  She will follow-up with us in roughly 4 to 6 weeks or sooner if there is no improvement or worsening.    Lewis Coyle MD  [facility] SPORTS MEDICINE CLINIC [place]    Face-to-face time:14 minutes  Record review time: 5Minutes  Documentation time: 2Minutes  Total time: 21 Minutes on day of visit independent procedure time    -----  Chief Complaint   Patient presents with     Left Knee - Pain       SUBJECTIVE  Karin Santacruz is a/an 52 year old female who is seen for follow up of left knee pain.     The patient is seen by themselves.    Date of injury: 4 months ago   Date of Last Visit: 2/16/22   Symptoms: worsened  Worsened by: Bending  Better with: NA  Treatment to date: rest/activity avoidance, elevation, ice, heat and ibuprofen  Associated symptoms: no distal numbness or tingling; denies swelling or warmth        REVIEW OF SYSTEMS:    See HPI     OBJECTIVE:  There were no vitals taken for this visit.     No further exam this visit    RADIOLOGY:    MRI of the left knee dated 2/18/2022.  Per radiology report:  Impression:  1. There is increased intrasubstance signal in the posterior horn of  the medial meniscus, which does not communicate with the  articular  surface consistent with mucoid degeneration  2. Small joint effusion.  3. Moderate to high-grade articular cartilage fissuring of the median  patellar ridge and lateral patellar facet, cartilage heterogeneity  within the trochlear cartilage.

## 2022-03-05 RX ORDER — TRIAMCINOLONE ACETONIDE 40 MG/ML
40 INJECTION, SUSPENSION INTRA-ARTICULAR; INTRAMUSCULAR
Status: DISCONTINUED | OUTPATIENT
Start: 2022-02-28 | End: 2024-07-30

## 2022-03-11 ENCOUNTER — OFFICE VISIT (OUTPATIENT)
Dept: FAMILY MEDICINE | Facility: CLINIC | Age: 52
End: 2022-03-11
Attending: FAMILY MEDICINE
Payer: COMMERCIAL

## 2022-03-11 VITALS
HEART RATE: 70 BPM | SYSTOLIC BLOOD PRESSURE: 120 MMHG | DIASTOLIC BLOOD PRESSURE: 80 MMHG | BODY MASS INDEX: 30.79 KG/M2 | HEIGHT: 65 IN

## 2022-03-11 DIAGNOSIS — Z11.59 ENCOUNTER FOR HEPATITIS C SCREENING TEST FOR LOW RISK PATIENT: Primary | ICD-10-CM

## 2022-03-11 DIAGNOSIS — Z00.00 ENCOUNTER FOR PREVENTIVE HEALTH EXAMINATION: ICD-10-CM

## 2022-03-11 DIAGNOSIS — Z12.4 SCREENING FOR CERVICAL CANCER: ICD-10-CM

## 2022-03-11 DIAGNOSIS — Z86.2 HISTORY OF ANEMIA: ICD-10-CM

## 2022-03-11 LAB — HCV AB SERPL QL IA: NONREACTIVE

## 2022-03-11 PROCEDURE — G0463 HOSPITAL OUTPT CLINIC VISIT: HCPCS

## 2022-03-11 PROCEDURE — 90750 HZV VACC RECOMBINANT IM: CPT

## 2022-03-11 PROCEDURE — 90471 IMMUNIZATION ADMIN: CPT

## 2022-03-11 PROCEDURE — 87624 HPV HI-RISK TYP POOLED RSLT: CPT | Performed by: FAMILY MEDICINE

## 2022-03-11 PROCEDURE — 99396 PREV VISIT EST AGE 40-64: CPT | Performed by: FAMILY MEDICINE

## 2022-03-11 PROCEDURE — G0145 SCR C/V CYTO,THINLAYER,RESCR: HCPCS | Performed by: FAMILY MEDICINE

## 2022-03-11 PROCEDURE — 36415 COLL VENOUS BLD VENIPUNCTURE: CPT | Performed by: FAMILY MEDICINE

## 2022-03-11 PROCEDURE — 250N000021 HC RX MED A9270 GY (STAT IND- M) 250

## 2022-03-11 PROCEDURE — 86803 HEPATITIS C AB TEST: CPT | Performed by: FAMILY MEDICINE

## 2022-03-11 ASSESSMENT — ANXIETY QUESTIONNAIRES
2. NOT BEING ABLE TO STOP OR CONTROL WORRYING: NOT AT ALL
1. FEELING NERVOUS, ANXIOUS, OR ON EDGE: NOT AT ALL
7. FEELING AFRAID AS IF SOMETHING AWFUL MIGHT HAPPEN: NOT AT ALL
GAD7 TOTAL SCORE: 0
3. WORRYING TOO MUCH ABOUT DIFFERENT THINGS: NOT AT ALL
6. BECOMING EASILY ANNOYED OR IRRITABLE: NOT AT ALL
5. BEING SO RESTLESS THAT IT IS HARD TO SIT STILL: NOT AT ALL

## 2022-03-11 ASSESSMENT — PATIENT HEALTH QUESTIONNAIRE - PHQ9: 5. POOR APPETITE OR OVEREATING: NOT AT ALL

## 2022-03-11 NOTE — LETTER
Date:March 24, 2022      Provider requested that no letter be sent. Do not send.       [facility]

## 2022-03-11 NOTE — PROGRESS NOTES
Pt. Here for Annual Exam  Chronic conditions    1..HTN-on lisinopril 25. Mg daily, had BMP 2021     2. .MDD, on citalopram, PHQ and GUNNER screens both 0, doing well  3. Hypercholesterol--statin not indicated, continue lifestyle changes, last lipid 2021  4. Takes iron to be better blood donor, 2-4x/year, has been turned away, in past. Unknown dose OTC supplemtne slow FE.  5. Eating disorder, unspecified--body image related, just completed 9 week [employer] Program, gets weighed as part of program, doing well, and states weights stable.     Preventive Care  3. Breast--no concerns; mammogram due, no prior abnormals  4. Gyne--menses irregular, short, no hot flashes/night sweats,   Last Pap ., no record, hpv done?  ,  both  ; post partum hemorrhage, no pregnancy conplications  8lb 14 oz biggest (children now 18, 20)  Vaginal --no concerns  Urinary--mild stress leaking, tolerable  No sexual concerns, lower libido but not concerning  Currently sexual active w/; no outside partners  No  Hx STI, hiv testing:  Never tested except maybe  Pregnancy  HIV screening--pregnancy tested  Hep C--elects  Traveled to [place]--? May had  Hep B vaccine  Due Shingrix  .     8. Bone health  Activity 150 minutes/wk--walking/cardio, some light weights  Vit D 1000 units; grandmother osteoporosis, level 34 VIT D 2021  9 colon Ca   Colonoscopy 2020 retuen 10 years    11. Cardiac screen  --had  Cardiac calcium scoring 2018: zero  .Glucose screening 2021 normal        PMH  Shigella at child  No other surgeries--cataracts     Family History         Family History   Problem Relation Age of Onset     Breast Cancer Mother       Melanoma Mother       Angina Father       Hypertension Father           Mother--breast ca. 81 htn lipid melnoma,thyroid, .  Father--htn, d. MI 42, lipids  Sibs: sister--htn, hashimotos, renal artery stenosis  p grandfather--d. MI under 50   Mat grandfather--d MI under 50  Mat grandmother--htn,  "lipid  Niece bipolar        Social History            Socioeconomic History     Marital status: Single       Spouse name: None     Number of children: None     Years of education: None     Highest education level: None   Occupational History     None   Social Needs     Financial resource strain: None     Food insecurity:       Worry: None       Inability: None     Transportation needs:       Medical: None       Non-medical: None   Tobacco Use     Smoking status: Former Smoker     Smokeless tobacco: Never Used   Substance and Sexual Activity     Alcohol use: Yes       Comment: occ     Drug use: Never     Sexual activity: Yes       Partners: Male   Lifestyle     Physical activity:       Days per week: None       Minutes per session: None     Stress: None   Relationships     Social connections:       Talks on phone: None       Gets together: None       Attends Cheondoism service: None       Active member of club or organization: None       Attends meetings of clubs or organizations: None       Relationship status: None     Intimate partner violence:       Fear of current or ex partner: None       Emotionally abused: None       Physically abused: None       Forced sexual activity: None   Other Topics Concern     None   Social History Narrative     None      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  Constitutional: Well appearing woman in no acute distress.   Psychological: appropriate mood.  Eyes: anicteric, normal extra-ocular movements,  pupils are equal and reactive to light.   Ears, Nose and Throat: tympanic membranes clear, nose clear and free of " lesions, throat clear, moist mucous membrames, neck supple with full range of motion.    Neck: No thyroidmegaly. No jugular venous distension, no carotid bruits.  Cardiovascular: regular rate and rhythm, normal S1 and S2, no murmurs, rubs or gallops, peripheral pulses full and symmetric   Respiratory: clear to auscultation, no wheezes or crackles, normal breath sounds.  Gastrointestinal: positive bowel sounds, nontender, no hepatosplenomegaly, no masses. No guarding or rebound.  Genitourinary: External genitalia is normal appearance. No enlargement of the [place] or [place] glands. Urethra and bladder are non-tender. Vagina is without lesions or discharge. Normal epithelium, no anterior or posterior wall defects. Cervix is smooth, without lesions, no cervical motion tenderness. Pap obtained with cytobrush.  Perineum without lesions.   Lymphatic: no cervical lymphadenopathy.  Musculoskeletal: full range of motion, no edema and motor strength is equal in the upper and lower extremities    Skin: no concerning lesions, no jaundice.  Neurological: cranial nerves intact, normal strength and sensation, reflexes at patella and biceps normal, normal gait, no tremor.   Monofilament Foot Exam: n/a  A/P  1. HCM  Immunizations--  Shingrex  Cancer screening--Need:  Mammogram Pap/HPV       CV risk- see below    Bone Health--Up to date  Counseled on adequate weight bearing activity and Vitamin D intake    STI/HIV Screening --Hep C screening    2. HTN--controlled on current medication  3. Hypercholesterol, managing with lifestyle changes  Due for repeat lipids and BMP in summer/next visit  4. History anemia--check iron studies      Follow-up 6 months for bp and mood

## 2022-03-11 NOTE — LETTER
3/11/2022       RE: Karin Santacruz  [address] [place] [address]     Dear Colleague,    Thank you for referring your patient, Karin Santacruz, to the [facility] [place] at [facility]. Please see a copy of my visit note below.    Pt. Here for Annual Exam  Chronic conditions    1..HTN-on lisinopril 25. Mg daily, had BMP 2021     2. .MDD, on citalopram, PHQ and GUNNER screens both 0, doing well  3. Hypercholesterol--statin not indicated, continue lifestyle changes, last lipid 2021  4. Takes iron to be better blood donor, 2-4x/year, has been turned away, in past. Unknown dose OTC supplemtne slow FE.  5. Eating disorder, unspecified--body image related, just completed 9 week Adriana Program, gets weighed as part of program, doing well, and states weights stable.     Preventive Care  3. Breast--no concerns; mammogram due, no prior abnormals  4. Gyne--menses irregular, short, no hot flashes/night sweats,   Last Pap ., no record, hpv done?  ,  both  ; post partum hemorrhage, no pregnancy conplications  8lb 14 oz biggest (children now 18, 20)  Vaginal --no concerns  Urinary--mild stress leaking, tolerable  No sexual concerns, lower libido but not concerning  Currently sexual active w/; no outside partners  No  Hx STI, hiv testing:  Never tested except maybe  Pregnancy  HIV screening--pregnancy tested  Hep C--elects  Traveled to [place]--? May had  Hep B vaccine  Due Shingrix  .     8. Bone health  Activity 150 minutes/wk--walking/cardio, some light weights  Vit D 1000 units; grandmother osteoporosis, level 34 VIT D 2021  9 colon Ca   Colonoscopy 2020 retuen 10 years    11. Cardiac screen  --had  Cardiac calcium scoring 2018: zero  .Glucose screening 2021 normal        PMH  Shigella at child  No other surgeries--cataracts     Family History         Family History   Problem Relation Age of Onset     Breast Cancer  "Mother       Melanoma Mother       Angina Father       Hypertension Father           Mother--breast ca. 81 htn lipid melnoma,thyroid, .  Father--htn, d. MI 42, lipids  Sibs: sister--htn, hashimotos, renal artery stenosis  p grandfather--d. MI under 50   Mat grandfather--d MI under 50  Mat grandmother--htn, lipid  Niece bipolar        Social History            Socioeconomic History     Marital status: Single       Spouse name: None     Number of children: None     Years of education: None     Highest education level: None   Occupational History     None   Social Needs     Financial resource strain: None     Food insecurity:       Worry: None       Inability: None     Transportation needs:       Medical: None       Non-medical: None   Tobacco Use     Smoking status: Former Smoker     Smokeless tobacco: Never Used   Substance and Sexual Activity     Alcohol use: Yes       Comment: occ     Drug use: Never     Sexual activity: Yes       Partners: Male   Lifestyle     Physical activity:       Days per week: None       Minutes per session: None     Stress: None   Relationships     Social connections:       Talks on phone: None       Gets together: None       Attends Anabaptism service: None       Active member of club or organization: None       Attends meetings of clubs or organizations: None       Relationship status: None     Intimate partner violence:       Fear of current or ex partner: None       Emotionally abused: None       Physically abused: None       Forced sexual activity: None   Other Topics Concern     None   Social History Narrative     None      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  Constitutional: Well appearing woman in no acute distress.   Psychological: appropriate mood.  Eyes: anicteric, normal extra-ocular movements,  pupils are equal and reactive to light.   Ears, Nose and Throat: tympanic membranes clear, nose clear and free of lesions, throat clear, moist mucous membrames, neck supple with full range of motion.    Neck: No thyroidmegaly. No jugular venous distension, no carotid bruits.  Cardiovascular: regular rate and rhythm, normal S1 and S2, no murmurs, rubs or gallops, peripheral pulses full and symmetric   Respiratory: clear to auscultation, no wheezes or crackles, normal breath sounds.  Gastrointestinal: positive bowel sounds, nontender, no hepatosplenomegaly, no masses. No guarding or rebound.  Genitourinary: External genitalia is normal appearance. No enlargement of the Barth[place] or [place] glands. Urethra and bladder are non-tender. Vagina is without lesions or discharge. Normal epithelium, no anterior or posterior wall defects. Cervix is smooth, without lesions, no cervical motion tenderness. Pap obtained with cytobrush.  Perineum without lesions.   Lymphatic: no cervical lymphadenopathy.  Musculoskeletal: full range of motion, no edema and motor strength is equal in the upper and lower extremities    Skin: no concerning lesions, no jaundice.  Neurological: cranial nerves intact, normal strength and sensation, reflexes at patella and biceps normal, normal gait, no tremor.   Monofilament Foot Exam: n/a  A/P  1. HCM  Immunizations--  Shingrex  Cancer screening--Need:  Mammogram Pap/HPV       CV risk- see below    Bone Health--Up to date  Counseled on adequate weight bearing activity and Vitamin D intake    STI/HIV Screening --Hep C screening    2. HTN--controlled on current medication  3. Hypercholesterol, managing with lifestyle changes  Due for repeat lipids and BMP in summer/next visit  4. History anemia--check iron  studies      Follow-up 6 months for bp and mood        Again, thank you for allowing me to participate in the care of your patient.      Sincerely,    Heron Manning MD

## 2022-03-12 ASSESSMENT — ANXIETY QUESTIONNAIRES: GAD7 TOTAL SCORE: 0

## 2022-03-15 LAB
BKR LAB AP GYN ADEQUACY: NORMAL
BKR LAB AP GYN INTERPRETATION: NORMAL
BKR LAB AP HPV REFLEX: NORMAL
BKR LAB AP LMP: NORMAL
BKR LAB AP PREVIOUS ABNORMAL: NORMAL
PATH REPORT.COMMENTS IMP SPEC: NORMAL
PATH REPORT.COMMENTS IMP SPEC: NORMAL
PATH REPORT.RELEVANT HX SPEC: NORMAL

## 2022-03-16 LAB
HUMAN PAPILLOMA VIRUS 16 DNA: NEGATIVE
HUMAN PAPILLOMA VIRUS 18 DNA: NEGATIVE
HUMAN PAPILLOMA VIRUS FINAL DIAGNOSIS: NORMAL
HUMAN PAPILLOMA VIRUS OTHER HR: NEGATIVE

## 2022-03-23 DIAGNOSIS — Z86.2 HISTORY OF ANEMIA: Primary | ICD-10-CM

## 2022-03-23 NOTE — RESULT ENCOUNTER NOTE
Dear Karin,     Here are your recent results which are within the expected normal range--no high risk HPV.Unfortunately the blood specimen for the iron test was hemolyzed (the cells broke open) at the lab, so it could not be run. I have put in another order for this and you can do it at your convenience. Please continue with your current plan of care and let us know if you have any questions or concerns.    Regards,  Heron Manning MD

## 2022-03-29 ENCOUNTER — ANCILLARY PROCEDURE (OUTPATIENT)
Dept: MAMMOGRAPHY | Facility: CLINIC | Age: 52
End: 2022-03-29
Attending: FAMILY MEDICINE
Payer: COMMERCIAL

## 2022-03-29 DIAGNOSIS — Z12.31 VISIT FOR SCREENING MAMMOGRAM: ICD-10-CM

## 2022-03-29 PROCEDURE — 77067 SCR MAMMO BI INCL CAD: CPT | Mod: TC | Performed by: RADIOLOGY

## 2022-04-04 ENCOUNTER — TELEPHONE (OUTPATIENT)
Dept: OBGYN | Facility: CLINIC | Age: 52
End: 2022-04-04
Payer: COMMERCIAL

## 2022-04-04 NOTE — TELEPHONE ENCOUNTER
Orders completed signed and faxed today.        Received fax for a call back from a routine screening mammogram. Fax requests a review and signature.     Fax placed in Dr. Manning's basket.     - Radhika Maldonado

## 2022-04-18 ENCOUNTER — ANCILLARY PROCEDURE (OUTPATIENT)
Dept: MAMMOGRAPHY | Facility: CLINIC | Age: 52
End: 2022-04-18
Attending: FAMILY MEDICINE
Payer: COMMERCIAL

## 2022-04-18 DIAGNOSIS — N64.89 BREAST ASYMMETRY: ICD-10-CM

## 2022-04-18 PROCEDURE — 76642 ULTRASOUND BREAST LIMITED: CPT | Mod: LT

## 2022-04-18 PROCEDURE — 77061 BREAST TOMOSYNTHESIS UNI: CPT | Mod: LT

## 2022-04-27 NOTE — RESULT ENCOUNTER NOTE
Dear Karin,     Your follow up breast imaging was negative for abnormalities. Please continue with your current plan of care and let us know if you have any questions or concerns.    Regards,  Heron Manning MD

## 2022-05-03 DIAGNOSIS — F33.42 MAJOR DEPRESSIVE DISORDER, RECURRENT EPISODE, IN FULL REMISSION (H): ICD-10-CM

## 2022-05-03 RX ORDER — ESCITALOPRAM OXALATE 10 MG/1
10 TABLET ORAL DAILY
Qty: 90 TABLET | Refills: 1 | Status: SHIPPED | OUTPATIENT
Start: 2022-05-03 | End: 2022-08-05 | Stop reason: DRUGHIGH

## 2022-05-03 NOTE — TELEPHONE ENCOUNTER
Received request for refill of Lexapro. Recent annual indicates doing well on this medication. Refill sent per protocol.

## 2022-07-07 ENCOUNTER — LAB (OUTPATIENT)
Dept: LAB | Facility: CLINIC | Age: 52
End: 2022-07-07
Payer: COMMERCIAL

## 2022-07-07 DIAGNOSIS — Z86.2 HISTORY OF ANEMIA: ICD-10-CM

## 2022-07-07 LAB
IRON SATN MFR SERPL: 30 % (ref 15–46)
IRON SERPL-MCNC: 69 UG/DL (ref 35–180)
TIBC SERPL-MCNC: 230 UG/DL (ref 240–430)

## 2022-07-07 PROCEDURE — 36415 COLL VENOUS BLD VENIPUNCTURE: CPT

## 2022-07-07 PROCEDURE — 83550 IRON BINDING TEST: CPT

## 2022-07-08 ENCOUNTER — MYC MEDICAL ADVICE (OUTPATIENT)
Dept: FAMILY MEDICINE | Facility: CLINIC | Age: 52
End: 2022-07-08

## 2022-07-20 ENCOUNTER — IMMUNIZATION (OUTPATIENT)
Dept: NURSING | Facility: CLINIC | Age: 52
End: 2022-07-20
Payer: COMMERCIAL

## 2022-07-20 PROCEDURE — 91305 COVID-19,PF,PFIZER (12+ YRS): CPT

## 2022-07-20 PROCEDURE — 0054A COVID-19,PF,PFIZER (12+ YRS): CPT

## 2022-07-21 ENCOUNTER — TELEPHONE (OUTPATIENT)
Dept: OBGYN | Facility: CLINIC | Age: 52
End: 2022-07-21

## 2022-07-21 NOTE — TELEPHONE ENCOUNTER
Left message for patient regarding appointment tomorrow, 7/22, at 10:20 with Dr. Manning. Visit is set as a video visit but Dr. Manning does not do virtual visits. Asked if patient would be able to come to clinic for appointment. If pt is ok with coming in, visit can be switch to P Return.    Teresa Huffman

## 2022-07-22 ENCOUNTER — OFFICE VISIT (OUTPATIENT)
Dept: FAMILY MEDICINE | Facility: CLINIC | Age: 52
End: 2022-07-22
Attending: FAMILY MEDICINE
Payer: COMMERCIAL

## 2022-07-22 ENCOUNTER — MYC MEDICAL ADVICE (OUTPATIENT)
Dept: FAMILY MEDICINE | Facility: CLINIC | Age: 52
End: 2022-07-22

## 2022-07-22 VITALS — HEIGHT: 65 IN | BODY MASS INDEX: 30.79 KG/M2

## 2022-07-22 DIAGNOSIS — Z53.9 NO SHOW: Primary | ICD-10-CM

## 2022-07-22 PROCEDURE — G0463 HOSPITAL OUTPT CLINIC VISIT: HCPCS

## 2022-08-05 ENCOUNTER — LAB (OUTPATIENT)
Dept: LAB | Facility: CLINIC | Age: 52
End: 2022-08-05
Attending: FAMILY MEDICINE
Payer: COMMERCIAL

## 2022-08-05 ENCOUNTER — OFFICE VISIT (OUTPATIENT)
Dept: FAMILY MEDICINE | Facility: CLINIC | Age: 52
End: 2022-08-05
Attending: FAMILY MEDICINE
Payer: COMMERCIAL

## 2022-08-05 VITALS
SYSTOLIC BLOOD PRESSURE: 142 MMHG | BODY MASS INDEX: 32.28 KG/M2 | DIASTOLIC BLOOD PRESSURE: 82 MMHG | HEART RATE: 66 BPM | WEIGHT: 194 LBS

## 2022-08-05 DIAGNOSIS — G47.09 OTHER INSOMNIA: Primary | ICD-10-CM

## 2022-08-05 DIAGNOSIS — R53.83 FATIGUE, UNSPECIFIED TYPE: ICD-10-CM

## 2022-08-05 DIAGNOSIS — F33.42 MAJOR DEPRESSIVE DISORDER, RECURRENT EPISODE, IN FULL REMISSION (H): ICD-10-CM

## 2022-08-05 DIAGNOSIS — F43.23 ADJUSTMENT DISORDER WITH MIXED ANXIETY AND DEPRESSED MOOD: ICD-10-CM

## 2022-08-05 DIAGNOSIS — I10 BENIGN ESSENTIAL HYPERTENSION: ICD-10-CM

## 2022-08-05 LAB
ANION GAP SERPL CALCULATED.3IONS-SCNC: 4 MMOL/L (ref 3–14)
BUN SERPL-MCNC: 9 MG/DL (ref 7–30)
CALCIUM SERPL-MCNC: 9.2 MG/DL (ref 8.5–10.1)
CHLORIDE BLD-SCNC: 108 MMOL/L (ref 94–109)
CO2 SERPL-SCNC: 30 MMOL/L (ref 20–32)
CREAT SERPL-MCNC: 0.62 MG/DL (ref 0.52–1.04)
GFR SERPL CREATININE-BSD FRML MDRD: >90 ML/MIN/1.73M2
GLUCOSE BLD-MCNC: 84 MG/DL (ref 70–99)
HGB BLD-MCNC: 12.7 G/DL (ref 11.7–15.7)
POTASSIUM BLD-SCNC: 4.4 MMOL/L (ref 3.4–5.3)
SODIUM SERPL-SCNC: 142 MMOL/L (ref 133–144)
TSH SERPL DL<=0.005 MIU/L-ACNC: 1.67 MU/L (ref 0.4–4)

## 2022-08-05 PROCEDURE — 85018 HEMOGLOBIN: CPT

## 2022-08-05 PROCEDURE — G0463 HOSPITAL OUTPT CLINIC VISIT: HCPCS

## 2022-08-05 PROCEDURE — 36415 COLL VENOUS BLD VENIPUNCTURE: CPT

## 2022-08-05 PROCEDURE — 99214 OFFICE O/P EST MOD 30 MIN: CPT | Performed by: FAMILY MEDICINE

## 2022-08-05 PROCEDURE — 84443 ASSAY THYROID STIM HORMONE: CPT

## 2022-08-05 PROCEDURE — 80048 BASIC METABOLIC PNL TOTAL CA: CPT

## 2022-08-05 RX ORDER — LISINOPRIL 2.5 MG/1
2.5 TABLET ORAL DAILY
Qty: 90 TABLET | Refills: 3 | Status: SHIPPED | OUTPATIENT
Start: 2022-08-05 | End: 2023-01-06

## 2022-08-05 RX ORDER — ESCITALOPRAM OXALATE 10 MG/1
10 TABLET ORAL DAILY
Qty: 90 TABLET | Refills: 1 | Status: CANCELLED | OUTPATIENT
Start: 2022-08-05

## 2022-08-05 RX ORDER — HYDROXYZINE PAMOATE 25 MG/1
25 CAPSULE ORAL 3 TIMES DAILY PRN
Qty: 90 CAPSULE | Refills: 0 | Status: SHIPPED | OUTPATIENT
Start: 2022-08-05 | End: 2022-09-09

## 2022-08-05 RX ORDER — ESCITALOPRAM OXALATE 20 MG/1
20 TABLET ORAL DAILY
Qty: 30 TABLET | Refills: 1 | Status: SHIPPED | OUTPATIENT
Start: 2022-08-05 | End: 2022-09-09

## 2022-08-05 ASSESSMENT — PAIN SCALES - GENERAL: PAINLEVEL: NO PAIN (0)

## 2022-08-05 NOTE — PROGRESS NOTES
Donn Frazier is a 52 year old, presenting for the following health issues:  Follow Up (C/O fatigue)      HPI     Fatigue and excessive sleepiness x 4-5 months, gradual onset    Context:   No change in job, eating habits better, exercising more  Son age 23 with mental health issues for years, getting worse over last 2 years --lots of small crises around this time--lives in a house with others independently  No other life changes    Started with fatigue during day, feeling need a nap during day, then within a few weeks noticed disrupted sleep  --feels both lack of energy and motivation, but also sleepiness, has fallen asleep in car, during watching TV, in not relaxed environment.   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 Mentally but not physically active.    Sleep:   finds mind racing as try to fall asleep. Longer to fall asleep. Will wake up in middle of night x 1, will take time to fall back to sleep, restless, not sleep soundly. Doesn't wake feeling rested.  +Snore x few years mild; no apnea,     CP or dyspnea--none with exertion  Nonsmoker  10 lb weight gain x 1 year  No palpitations, constipation, diarrhea or  visual changes  6 months without menses; no hot flashes or night sweats  Etoh--1-2/week, 1/sitting  Substances--no      History of depression, currently on Lexpro 10 mg daily   No therapist, signed up for [facility] group  GAD7= 8  phq9= 10    ROS  Lower back pain more lately  12 point ROS negative except where noted above        Objective    BP (!) 142/82   Pulse 66   Wt 88 kg (194 lb)   LMP 01/05/2022   Breastfeeding No   BMI 32.28 kg/m    Body mass index is 32.28 kg/m .  Physical Exam   Speech RRR, mood is mildly down, no psychomotor changes, thought process is appropriate,   Affect is  normal. No evidence of suicidality.   EXAM:  Constitutional: healthy, alert and no distress   Cardiovascular: negative, PMI normal. No lifts, heaves, or thrills. RRR. No murmurs, clicks gallops or rub  Respiratory: negative, Percussion normal. Good diaphragmatic excursion. Lungs clear  Neck: Neck supple. No adenopathy. Thyroid symmetric, normal size,    A/P  1.Fatigue  2. Adjustment disorder, mixed  3. Insomnia  4. HTN  Counseled multfactorial nature of fatigue, including potential biological, psychological and life/social components  Will check TSH, Hgb   Reviewed recent life changes, including ongoing stressor of being parent of adult child with mental illness  Discussed current psychological symptoms more consistent with  adjustment disorder  Recommend adding therapist, has one in mind, continue [facility] supports  Add Vistaril 25 mg at bedtime  for sleep  Counseled regarding sleep hygeine    Continue Lisinopril 2.5 mg daily, re-evaluate BP at next visit    Follow-up 4-6 weeks        .  ..

## 2022-08-16 NOTE — RESULT ENCOUNTER NOTE
Dear Karin,     Here are your recent results which are within the expected normal range. Please continue with your current plan of care and let us know if you have any questions or concerns.    Regards,  Heron Manning MD

## 2022-09-08 DIAGNOSIS — F43.23 ADJUSTMENT DISORDER WITH MIXED ANXIETY AND DEPRESSED MOOD: ICD-10-CM

## 2022-09-09 RX ORDER — HYDROXYZINE PAMOATE 25 MG/1
25 CAPSULE ORAL 3 TIMES DAILY PRN
Qty: 90 CAPSULE | Refills: 0 | Status: SHIPPED | OUTPATIENT
Start: 2022-09-09 | End: 2022-11-01

## 2022-09-09 RX ORDER — ESCITALOPRAM OXALATE 20 MG/1
20 TABLET ORAL DAILY
Qty: 90 TABLET | Refills: 0 | Status: SHIPPED | OUTPATIENT
Start: 2022-09-09 | End: 2023-01-06

## 2022-11-01 DIAGNOSIS — F43.23 ADJUSTMENT DISORDER WITH MIXED ANXIETY AND DEPRESSED MOOD: ICD-10-CM

## 2022-11-01 RX ORDER — HYDROXYZINE PAMOATE 25 MG/1
25 CAPSULE ORAL 3 TIMES DAILY PRN
Qty: 90 CAPSULE | Refills: 0 | Status: SHIPPED | OUTPATIENT
Start: 2022-11-01 | End: 2023-01-18

## 2022-11-03 ENCOUNTER — MYC MEDICAL ADVICE (OUTPATIENT)
Dept: FAMILY MEDICINE | Facility: CLINIC | Age: 52
End: 2022-11-03

## 2022-11-09 ENCOUNTER — OFFICE VISIT (OUTPATIENT)
Dept: PHYSICAL MEDICINE AND REHAB | Facility: CLINIC | Age: 52
End: 2022-11-09
Payer: COMMERCIAL

## 2022-11-09 VITALS
HEIGHT: 65 IN | HEART RATE: 75 BPM | OXYGEN SATURATION: 98 % | SYSTOLIC BLOOD PRESSURE: 138 MMHG | WEIGHT: 199 LBS | BODY MASS INDEX: 33.15 KG/M2 | DIASTOLIC BLOOD PRESSURE: 74 MMHG

## 2022-11-09 DIAGNOSIS — M54.42 CHRONIC LEFT-SIDED LOW BACK PAIN WITH LEFT-SIDED SCIATICA: Primary | ICD-10-CM

## 2022-11-09 DIAGNOSIS — G89.29 CHRONIC LEFT-SIDED LOW BACK PAIN WITH LEFT-SIDED SCIATICA: Primary | ICD-10-CM

## 2022-11-09 DIAGNOSIS — M54.16 LEFT LUMBAR RADICULITIS: ICD-10-CM

## 2022-11-09 PROCEDURE — 99204 OFFICE O/P NEW MOD 45 MIN: CPT | Performed by: NURSE PRACTITIONER

## 2022-11-09 RX ORDER — PREDNISONE 10 MG/1
TABLET ORAL
Qty: 18 TABLET | Refills: 0 | Status: SHIPPED | OUTPATIENT
Start: 2022-11-09 | End: 2023-01-04

## 2022-11-09 ASSESSMENT — PAIN SCALES - GENERAL: PAINLEVEL: MODERATE PAIN (4)

## 2022-11-09 NOTE — PROGRESS NOTES
ASSESSMENT: Karin Santacruz is a 52 year old female who presents for consultation at the request of PCP Heron Manning, with a past medical history significant for early family history of CAD, elevated BP without diagnosis hypertension, who presents today for new patient evaluation of:    -Ongoing progressive left low back pain with left lumbar radiculitis L5 dermatomal pattern onset August 2022 no known injury, symptoms now severe.    Patient is neurologically intact on exam. No myelopathic or red flag symptoms.     OSWESTRY DISABILITY INDEX 11/9/2022   Count 10   Sum 24   Oswestry Score (%) 48   Some recent data might be hidden            Diagnoses and all orders for this visit:  Chronic left-sided low back pain with left-sided sciatica  -     MR Lumbar Spine w/o Contrast; Future  -     predniSONE (DELTASONE) 10 MG tablet; Prednisone 10 mg tablet, 3 tablets p.o. daily for 3 days, then 2 tablets p.o. daily for 3 days, then 1 tablet p.o. daily for 3 days then stop.  -     Physical Therapy Referral; Future  Left lumbar radiculitis  -     MR Lumbar Spine w/o Contrast; Future  -     predniSONE (DELTASONE) 10 MG tablet; Prednisone 10 mg tablet, 3 tablets p.o. daily for 3 days, then 2 tablets p.o. daily for 3 days, then 1 tablet p.o. daily for 3 days then stop.  -     Physical Therapy Referral; Future    PLAN:  Reviewed spine anatomy and disease process. Discussed diagnosis and treatment options with the patient today. A shared decision making model was used.  The patient's values and choices were respected. The following represents what was discussed and decided upon by the provider and the patient.      -DIAGNOSTIC TESTS:   -- Order lumbar spine MRI to evaluate significant lumbar radiculitis for possible intervention.    -PHYSICAL THERAPY: Referral to physical therapy placed to establish home exercises for core strengthening and nerve glides.  Discussed the importance of core strengthening, ROM, stretching exercises  with the patient and how each of these entities is important in decreasing pain.  Explained to the patient that the purpose of physical therapy is to teach the patient a home exercise program.  These exercises need to be performed every day in order to decrease pain and prevent future occurrences of pain.        -MEDICATIONS: Prescribed Medrol Dosepak today.  Discussed multiple medication options today with patient. Discussed risks, side effects, and proper use of medications. Patient verbalized understanding.    -INTERVENTIONS: Consider injections pending imaging review if no benefit with Medrol Dosepak.  Discussed risks and benefits of injections with patient today.    -PATIENT EDUCATION:  Total time of 42 minutes, on the day of service, spent with the patient, reviewing the chart, placing orders, and documenting.   -Today we also discussed the issues related to the current COVID-19 pandemic, the pros and cons of the current treatment plan, the CDC guidelines such as social distancing, washing hands, masking, and covering the cough.    -FOLLOW-UP:   [facility] or video visit is okay to    Advised patient to call the [facility] if symptoms worsen or you have problems controlling bladder and bowel function.   ______________________________________________________________________    SUBJECTIVE:  HPI:  Karin Santacruz  Is a 52 year old female who presents today for new patient evaluation of low back pain left lumbosacral junction that radiates to left buttock and lateral thigh onset August 2022 has been progressive and more severe in the last 6 weeks and intolerable she reports in the last couple of weeks.  She does report that pain is best first thing in the morning but throughout the day does progress to become more severe, does also worsen with walking.  Patient currently reports her pain is a 5/10, does report that it gets up to a 9/10 on a regular basis throughout the day, a 2  at its best.  Denies numbness or tingling sensations.  Denies right leg symptoms.  Denies any recent trips or falls or balance problems.  Denies bowel or bladder loss control.    Patient reports minimal prior history of back pain prior to August onset of symptoms.    -Treatment to Date: No prior spinal surgery or spinal injections  No prior physical therapy or chiropractic treatments    -Medications:  Ibuprofen with no benefit  Tylenol with no benefit    Current Outpatient Medications   Medication     predniSONE (DELTASONE) 10 MG tablet     escitalopram (LEXAPRO) 20 MG tablet     hydrOXYzine (VISTARIL) 25 MG capsule     lisinopril (ZESTRIL) 2.5 MG tablet     Vitamin D3 (CHOLECALCIFEROL) 25 mcg (1000 units) tablet     Current Facility-Administered Medications   Medication     triamcinolone (KENALOG-40) injection 40 mg       No Known Allergies    No past medical history on file.     Patient Active Problem List   Diagnosis     Family history of early CAD     Family history of renal artery stenosis     Elevated BP without diagnosis of hypertension       Past Surgical History:   Procedure Laterality Date      SECTION        SECTION       COLONOSCOPY N/A 2020    Procedure: COLONOSCOPY;  Surgeon: Hyun Perdomo MD;  Location:  GI     COLONOSCOPY N/A 2020    Procedure: Colonoscopy, With Polypectomy And Biopsy;  Surgeon: Hyun Perdomo MD;  Location:  GI       Family History   Problem Relation Age of Onset     Breast Cancer Mother 80     Melanoma Mother      Angina Father      Hypertension Father      Ovarian Cancer No family hx of        Reviewed past medical, surgical, and family history with patient found on new patient intake packet located in EMR Media tab.     SOCIAL HX: Patient is , works as an L&D consultant.  Patient denies smoking/tobacco use.  Does report drinking alcohol 1 drink per week.  Denies history being a heavy drinker, denies recreational drug use.    ROS:  "Positive for muscle pain.  Specifically negative for bowel/bladder dysfunction, balance changes, headache, dizziness, foot drop, fevers, chills, appetite changes, nausea/vomiting, unexplained weight loss. Otherwise 13 systems reviewed are negative. Please see the patient's intake questionnaire from today for details.    OBJECTIVE:  BP (!) 161/78 (BP Location: Right arm, Patient Position: Sitting)   Pulse 75   Ht 5' 5\" (1.651 m)   Wt 199 lb (90.3 kg)   SpO2 98%   BMI 33.12 kg/m      PHYSICAL EXAMINATION:    --CONSTITUTIONAL:  Vital signs as above.  No acute distress.  The patient is well nourished and well groomed.  --PSYCHIATRIC:  Appropriate mood and affect. The patient is awake, alert, oriented to person, place, time and answering questions appropriately with clear speech.    --SKIN:  Skin over the face, bilateral lower extremities, and posterior torso is clean, dry, intact without rashes.    --RESPIRATORY: Normal rhythm and effort. No abnormal accessory muscle breathing patterns noted.   --ABDOMINAL:  Non-distended.  --STANDING EXAMINATION:  Normal lumbar lordosis noted, no lateral shift.  --MUSCULOSKELETAL: Lumbar spine inspection reveals no evidence of deformity. Range of motion is not limited in lumbar flexion, extension, lateral rotation.  Tenderness to palpation lumbosacral junction on the left. Straight leg raising in the supine position is negative to radicular pain on the right and positive on the left. Sciatic notch non-tender.  --SACROILIAC JOINT: Negative distraction.  Negative Carlos Alberto's with reproduction of pain to affected extremity.  Negative Gaenslen's Test with reproduction of pain to affected extremity.  --GROSS MOTOR: Gait is non-antalgic. Easily arises from a seated position. Toe walking and heel walking are normal without significant difficulty.    --LOWER EXTREMITY MOTOR TESTING:  Plantar flexion left 5/5, right 5/5   Dorsiflexion left 5/5, right 5/5   Great toe MTP extension left 5/5, " right 5/5  Knee flexion left 5/5, right 5/5  Knee extension left 5/5, right 5/5   Hip flexion left 5/5, right 5/5  Hip abduction left 5/5, right 5/5  Hip adduction left 5/5, right 5/5   --HIPS: Full range of motion bilaterally. Negative FABERs on the involved lower extremity.   --NEUROLOGICAL:  2/4 patellar, medial hamstring, and achilles reflexes bilaterally.  Sensation to light touch is intact in the bilateral L4, L5, and S1 dermatomes. Babinski is negative. No clonus.  Negative Erickson reflex bilaterally.  --VASCULAR:  2/4 dorsalis pedis and posterior tibialsi pulses bilaterally.  Bilateral lower extremities are warm.  There is no pitting edema of the bilateral lower extremities.    RESULTS: Prior medical records from [facility] were reviewed today.    Imaging:   No results found.

## 2022-11-09 NOTE — PATIENT INSTRUCTIONS
~Please call our [facility] Nurse Navigation line [phone] with any questions or concerns about your treatment plan, if symptoms worsen and you would like to be seen urgently, or if you have problems controlling bladder and bowel function.       A Medrol Dose Pack (Prednisone Taper) was prescribed today for your acute pain. Please follow the dosing instruction on prescription bottle.     POSSIBLE STEROID SIDE EFFECTS   -If you experience these, it should only last for a short period-   Swelling   Increased appetite   Skin redness (flushness)   Skin rash   Mouth (oral) irritation   Blood sugar (glucose) levels   Sweats   Mood changes       Imaging has been ordered. Radiology will call you to schedule. Please call below if you do not hear from them in the next couple of days.     [facility] Radiology Scheduling    Please call [phone] to schedule your image(s) (select option #1). There are 3 different locations, see below.     [facility]  [address] [place] [address] Imaging - [place]  [address]   [place] [address]       A Medrol Dose Pack (Prednisone Taper) was prescribed today for your acute pain. Please follow the dosing instruction on prescription bottle.     POSSIBLE STEROID SIDE EFFECTS   -If you experience these, it should only last for a short period-   Change in menstrual flow   Swelling   Increased appetite   Skin redness (flushness)   Skin rash   Mouth (oral) irritation   Blood sugar (glucose) levels   Sweats   Mood changes       ~You have been referred for Physical Therapy to [facility]. They will call you to schedule an appointment.      Scheduling phone number is [phone] for [facility], or [place] location.  If you have not heard from the scheduling office within 2 business days, please  call [phone] for ALL other locations.    Discussed the importance of core strengthening, ROM, stretching exercises and how each of these entities is important in decreasing pain and improving long term spine health.  The purpose of physical therapy is to teach you an individualized home exercise program.  These exercises need to be performed every day in order to decrease pain and prevent future occurrences of pain.

## 2022-11-09 NOTE — LETTER
11/9/2022         RE: Karin Santacruz  [address] [place] [address]        Dear Colleague,    Thank you for referring your patient, Karin Santacruz, to the [facility]. Please see a copy of my visit note below.    ASSESSMENT: Karin Santacruz is a 52 year old female who presents for consultation at the request of PCP Heron Manning, with a past medical history significant for early family history of CAD, elevated BP without diagnosis hypertension, who presents today for new patient evaluation of:    -Ongoing progressive left low back pain with left lumbar radiculitis L5 dermatomal pattern onset August 2022 no known injury, symptoms now severe.    Patient is neurologically intact on exam. No myelopathic or red flag symptoms.     OSWESTRY DISABILITY INDEX 11/9/2022   Count 10   Sum 24   Oswestry Score (%) 48   Some recent data might be hidden            Diagnoses and all orders for this visit:  Chronic left-sided low back pain with left-sided sciatica  -     MR Lumbar Spine w/o Contrast; Future  -     predniSONE (DELTASONE) 10 MG tablet; Prednisone 10 mg tablet, 3 tablets p.o. daily for 3 days, then 2 tablets p.o. daily for 3 days, then 1 tablet p.o. daily for 3 days then stop.  -     Physical Therapy Referral; Future  Left lumbar radiculitis  -     MR Lumbar Spine w/o Contrast; Future  -     predniSONE (DELTASONE) 10 MG tablet; Prednisone 10 mg tablet, 3 tablets p.o. daily for 3 days, then 2 tablets p.o. daily for 3 days, then 1 tablet p.o. daily for 3 days then stop.  -     Physical Therapy Referral; Future    PLAN:  Reviewed spine anatomy and disease process. Discussed diagnosis and treatment options with the patient today. A shared decision making model was used.  The patient's values and choices were respected. The following represents what was discussed and decided upon by the provider and the patient.      -DIAGNOSTIC TESTS:   -- Order lumbar spine MRI to  evaluate significant lumbar radiculitis for possible intervention.    -PHYSICAL THERAPY: Referral to physical therapy placed to establish home exercises for core strengthening and nerve glides.  Discussed the importance of core strengthening, ROM, stretching exercises with the patient and how each of these entities is important in decreasing pain.  Explained to the patient that the purpose of physical therapy is to teach the patient a home exercise program.  These exercises need to be performed every day in order to decrease pain and prevent future occurrences of pain.        -MEDICATIONS: Prescribed Medrol Dosepak today.  Discussed multiple medication options today with patient. Discussed risks, side effects, and proper use of medications. Patient verbalized understanding.    -INTERVENTIONS: Consider injections pending imaging review if no benefit with Medrol Dosepak.  Discussed risks and benefits of injections with patient today.    -PATIENT EDUCATION:  Total time of 42 minutes, on the day of service, spent with the patient, reviewing the chart, placing orders, and documenting.   -Today we also discussed the issues related to the current COVID-19 pandemic, the pros and cons of the current treatment plan, the CDC guidelines such as social distancing, washing hands, masking, and covering the cough.    -FOLLOW-UP:   Follow-up [employer] message for imaging review or video visit is okay to    Advised patient to call the [facility] if symptoms worsen or you have problems controlling bladder and bowel function.   ______________________________________________________________________    SUBJECTIVE:  HPI:  Karin Santacruz  Is a 52 year old female who presents today for new patient evaluation of low back pain left lumbosacral junction that radiates to left buttock and lateral thigh onset August 2022 has been progressive and more severe in the last 6 weeks and intolerable she reports in the last couple of weeks.  She does  report that pain is best first thing in the morning but throughout the day does progress to become more severe, does also worsen with walking.  Patient currently reports her pain is a 5/10, does report that it gets up to a 9/10 on a regular basis throughout the day, a 2 at its best.  Denies numbness or tingling sensations.  Denies right leg symptoms.  Denies any recent trips or falls or balance problems.  Denies bowel or bladder loss control.    Patient reports minimal prior history of back pain prior to August onset of symptoms.    -Treatment to Date: No prior spinal surgery or spinal injections  No prior physical therapy or chiropractic treatments    -Medications:  Ibuprofen with no benefit  Tylenol with no benefit    Current Outpatient Medications   Medication     predniSONE (DELTASONE) 10 MG tablet     escitalopram (LEXAPRO) 20 MG tablet     hydrOXYzine (VISTARIL) 25 MG capsule     lisinopril (ZESTRIL) 2.5 MG tablet     Vitamin D3 (CHOLECALCIFEROL) 25 mcg (1000 units) tablet     Current Facility-Administered Medications   Medication     triamcinolone (KENALOG-40) injection 40 mg       No Known Allergies    No past medical history on file.     Patient Active Problem List   Diagnosis     Family history of early CAD     Family history of renal artery stenosis     Elevated BP without diagnosis of hypertension       Past Surgical History:   Procedure Laterality Date      SECTION        SECTION       COLONOSCOPY N/A 2020    Procedure: COLONOSCOPY;  Surgeon: Hyun Perdomo MD;  Location:  GI     COLONOSCOPY N/A 2020    Procedure: Colonoscopy, With Polypectomy And Biopsy;  Surgeon: Hyun Perdomo MD;  Location:  GI       Family History   Problem Relation Age of Onset     Breast Cancer Mother 80     Melanoma Mother      Angina Father      Hypertension Father      Ovarian Cancer No family hx of        Reviewed past medical, surgical, and family history with patient found on new  "patient intake packet located in [employer]MR [employer] tab.     SOCIAL HX: Patient is , works as an L&D consultant.  Patient denies smoking/tobacco use.  Does report drinking alcohol 1 drink per week.  Denies history being a heavy drinker, denies recreational drug use.    ROS: Positive for muscle pain.  Specifically negative for bowel/bladder dysfunction, balance changes, headache, dizziness, foot drop, fevers, chills, appetite changes, nausea/vomiting, unexplained weight loss. Otherwise 13 systems reviewed are negative. Please see the patient's intake questionnaire from today for details.    OBJECTIVE:  BP (!) 161/78 (BP Location: Right arm, Patient Position: Sitting)   Pulse 75   Ht 5' 5\" (1.651 m)   Wt 199 lb (90.3 kg)   SpO2 98%   BMI 33.12 kg/m      PHYSICAL EXAMINATION:    --CONSTITUTIONAL:  Vital signs as above.  No acute distress.  The patient is well nourished and well groomed.  --PSYCHIATRIC:  Appropriate mood and affect. The patient is awake, alert, oriented to person, place, time and answering questions appropriately with clear speech.    --SKIN:  Skin over the face, bilateral lower extremities, and posterior torso is clean, dry, intact without rashes.    --RESPIRATORY: Normal rhythm and effort. No abnormal accessory muscle breathing patterns noted.   --ABDOMINAL:  Non-distended.  --STANDING EXAMINATION:  Normal lumbar lordosis noted, no lateral shift.  --MUSCULOSKELETAL: Lumbar spine inspection reveals no evidence of deformity. Range of motion is not limited in lumbar flexion, extension, lateral rotation.  Tenderness to palpation lumbosacral junction on the left. Straight leg raising in the supine position is negative to radicular pain on the right and positive on the left. Sciatic notch non-tender.  --SACROILIAC JOINT: Negative distraction.  Negative Carlos Alberto's with reproduction of pain to affected extremity.  Negative Gaenslen's Test with reproduction of pain to affected extremity.  --GROSS MOTOR: Gait " is non-antalgic. Easily arises from a seated position. Toe walking and heel walking are normal without significant difficulty.    --LOWER EXTREMITY MOTOR TESTING:  Plantar flexion left 5/5, right 5/5   Dorsiflexion left 5/5, right 5/5   Great toe MTP extension left 5/5, right 5/5  Knee flexion left 5/5, right 5/5  Knee extension left 5/5, right 5/5   Hip flexion left 5/5, right 5/5  Hip abduction left 5/5, right 5/5  Hip adduction left 5/5, right 5/5   --HIPS: Full range of motion bilaterally. Negative FABERs on the involved lower extremity.   --NEUROLOGICAL:  2/4 patellar, medial hamstring, and achilles reflexes bilaterally.  Sensation to light touch is intact in the bilateral L4, L5, and S1 dermatomes. Babinski is negative. No clonus.  Negative Erickson reflex bilaterally.  --VASCULAR:  2/4 dorsalis pedis and posterior tibialsi pulses bilaterally.  Bilateral lower extremities are warm.  There is no pitting edema of the bilateral lower extremities.    RESULTS: Prior medical records from [facility] and [facility] were reviewed today.    Imaging:   No results found.             Again, thank you for allowing me to participate in the care of your patient.        Sincerely,        Юлия Figueroa, CNP

## 2022-11-21 ENCOUNTER — THERAPY VISIT (OUTPATIENT)
Dept: PHYSICAL THERAPY | Facility: CLINIC | Age: 52
End: 2022-11-21
Attending: NURSE PRACTITIONER
Payer: COMMERCIAL

## 2022-11-21 ENCOUNTER — HOSPITAL ENCOUNTER (OUTPATIENT)
Dept: MRI IMAGING | Facility: HOSPITAL | Age: 52
Discharge: HOME | End: 2022-11-21
Attending: NURSE PRACTITIONER | Admitting: NURSE PRACTITIONER
Payer: COMMERCIAL

## 2022-11-21 DIAGNOSIS — M54.16 LEFT LUMBAR RADICULITIS: ICD-10-CM

## 2022-11-21 DIAGNOSIS — G89.29 CHRONIC LEFT-SIDED LOW BACK PAIN WITH LEFT-SIDED SCIATICA: ICD-10-CM

## 2022-11-21 DIAGNOSIS — M54.42 CHRONIC LEFT-SIDED LOW BACK PAIN WITH LEFT-SIDED SCIATICA: ICD-10-CM

## 2022-11-21 PROCEDURE — 97161 PT EVAL LOW COMPLEX 20 MIN: CPT | Mod: GP | Performed by: PHYSICAL THERAPIST

## 2022-11-21 PROCEDURE — 97530 THERAPEUTIC ACTIVITIES: CPT | Mod: GP | Performed by: PHYSICAL THERAPIST

## 2022-11-21 PROCEDURE — 72148 MRI LUMBAR SPINE W/O DYE: CPT

## 2022-11-21 NOTE — PROGRESS NOTES
Physical Therapy Initial Examination/Evaluation  November 21, 2022    Karin Santacruz is a 52 year old year old female referred to physical therapy by Юлия Figueroa CNP for:    Diagnosis: LBP   Precautions/Restrictions/MD instructions/Other pertinent hx E&T    Therapist Impression:   Karin presents with symptoms consistent withy internal derangement/disc pathology.  We initiated extension directional preference, posture education, and will progress to core strengthening as symptom allow.    GOALS: lifting, walking    NEXT: core strengthening    PTRX: handouts    Subjective:  Related PMH: None Previous Treatment: Ice and Ibuprofen Effect of prior treatment: good, but then came back  Chief Complaint/Functional Limitations:   Walking 1-1.5 miles with pain and see below in therapy evaluation codes   Pain Location: L sided LBP Frequency: Intermittent Described as: sharp Alleviated by: Rest and Ibuprofen Progression of Symptoms: Gradually getting worse. 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, prolonged sitting    Lumbar Spine Evaluation  Static Posture  Standing posture: Not assessed  Sitting posture: Fair    Hip Joint Screen Negative except L hip flexion reproduced L LBP    Trunk Range of Motion  Movement Loss Major Moderate Minimal Nil Pain   Flexion    x    Extension   x     Sidebending R    x    Sidebending L    x    Side Gliding R    x    Side Gliding L    x    Thoracic  Rotation R        Thoracic Rotation L          Test Movements  Rep FIS Nil No effect   Rep EIS Increases No worse   Rep EIL Nil No effect     Flexibility Left Right   Quadriceps NA NA   Hamstrings none/WNL none/WNL   Figure 4 NA NA          Special Tests  Spring testing: Positive L3-L4  Neural tension: Positive, during R SLR on L  SI joint test: Negative    Palpation:  Not assessed    Assessment/Plan:  Patient is a 52 year old female with lumbar complaints.    Patient has the following significant findings with corresponding treatment plan.                Diagnosis 1:  LBP  Pain -  hot/cold therapy, manual therapy, splint/taping/bracing/orthotics, self management, education, directional preference exercise and home program  Decreased ROM/flexibility - manual therapy and therapeutic exercise  Decreased strength - therapeutic exercise and therapeutic activities  Impaired posture - neuro re-education    Therapy Evaluation Codes:   Cumulative Therapy Evaluation is: Low complexity.    Previous and current functional limitations:  (See Goal Flow Sheet for this information)    Short term and Long term goals: (See Goal Flow Sheet for this information)     Communication ability:  Patient appears to be able to clearly communicate and understand verbal and written communication and follow directions correctly.  Treatment Explanation - The following has been discussed with the patient:   RX ordered/plan of care  Anticipated outcomes  Possible risks and side effects  This patient would benefit from PT intervention to resume normal activities.   Rehab potential is good.    Frequency:  1 X week, once daily  Duration:  for 6 weeks  Discharge Plan:  Achieve all LTG.  Independent in home treatment program.  Reach maximal therapeutic benefit.    Please refer to the daily flowsheet for treatment today, total treatment time and time spent performing 1:1 timed codes.

## 2022-11-23 PROBLEM — M54.16 LEFT LUMBAR RADICULITIS: Status: ACTIVE | Noted: 2022-11-23

## 2022-12-19 ENCOUNTER — THERAPY VISIT (OUTPATIENT)
Dept: PHYSICAL THERAPY | Facility: CLINIC | Age: 52
End: 2022-12-19
Payer: COMMERCIAL

## 2022-12-19 DIAGNOSIS — M54.16 LEFT LUMBAR RADICULITIS: ICD-10-CM

## 2022-12-19 DIAGNOSIS — M54.42 CHRONIC LEFT-SIDED LOW BACK PAIN WITH LEFT-SIDED SCIATICA: Primary | ICD-10-CM

## 2022-12-19 DIAGNOSIS — G89.29 CHRONIC LEFT-SIDED LOW BACK PAIN WITH LEFT-SIDED SCIATICA: Primary | ICD-10-CM

## 2022-12-19 DIAGNOSIS — F43.23 ADJUSTMENT DISORDER WITH MIXED ANXIETY AND DEPRESSED MOOD: ICD-10-CM

## 2022-12-19 PROCEDURE — 97140 MANUAL THERAPY 1/> REGIONS: CPT | Mod: GP | Performed by: PHYSICAL THERAPIST

## 2022-12-19 PROCEDURE — 97110 THERAPEUTIC EXERCISES: CPT | Mod: GP | Performed by: PHYSICAL THERAPIST

## 2022-12-19 PROCEDURE — 97530 THERAPEUTIC ACTIVITIES: CPT | Mod: GP | Performed by: PHYSICAL THERAPIST

## 2022-12-19 RX ORDER — ESCITALOPRAM OXALATE 20 MG/1
20 TABLET ORAL DAILY
Qty: 90 TABLET | Refills: 0 | Status: CANCELLED | OUTPATIENT
Start: 2022-12-19

## 2022-12-19 NOTE — PROGRESS NOTES
Diagnosis: LBP   Precautions/Restrictions/MD instructions/Other pertinent hx E&T    Therapist Impression:   Worsening of symptoms.  Trial flexion directional preference and added in TPR to address muscle spasm.    GOALS: lifting, walking    NEXT: core strengthening    PTRX: handouts    Subjective:  Walked 4 miles on December 7 and was sore for 3 days.  Now pain is constant.  Pain is always there.      Objective:  Trunk Range of Motion  Movement Loss Major Moderate Minimal Nil Pain   Flexion    x    Extension   x  x   Sidebending R        Sidebending L        Side Gliding R        Side Gliding L        Thoracic Rotation R        Thoracic Rotation L          Test Movements  Rep FIS Nil No effect   Rep EIL Nil No effect      TTP along L lumbar paraspinal and QL

## 2022-12-29 ENCOUNTER — TELEPHONE (OUTPATIENT)
Dept: OBGYN | Facility: CLINIC | Age: 52
End: 2022-12-29

## 2022-12-29 NOTE — TELEPHONE ENCOUNTER
[facility] Call Center    Phone Message    May a detailed message be left on voicemail: yes     Reason for Call: Other: .  Pt seen Nigel in the past and would like to make an appt for blood pressure check. Pt also states she needs to be seen in order to refill  prescription. Please call pt     Action Taken: Message routed to:  Other: [facility]    Travel Screening: Not Applicable

## 2022-12-29 NOTE — TELEPHONE ENCOUNTER
Tried to reach the patient via phone, but received her voicemail.     Left her a message stating that she will need to establish care with a new primary care doctor and we do have Dr. Billings here in our clinic. Dr. Billings does have an opening next Tuesday 1-3-23 at 3:40(40 minute visit for new patient) if she would like that.  This way she could establish care with Dr. Billings and go over her medications that she needs refilled. Left our call back number [phone]

## 2023-01-03 ENCOUNTER — OFFICE VISIT (OUTPATIENT)
Dept: PHYSICAL MEDICINE AND REHAB | Facility: REHABILITATION | Age: 53
End: 2023-01-03
Payer: COMMERCIAL

## 2023-01-03 DIAGNOSIS — M54.42 CHRONIC LEFT-SIDED LOW BACK PAIN WITH LEFT-SIDED SCIATICA: Primary | ICD-10-CM

## 2023-01-03 DIAGNOSIS — G89.29 CHRONIC LEFT-SIDED LOW BACK PAIN WITH LEFT-SIDED SCIATICA: Primary | ICD-10-CM

## 2023-01-03 DIAGNOSIS — M54.16 LEFT LUMBAR RADICULITIS: ICD-10-CM

## 2023-01-03 PROCEDURE — 99207 PR NO CHARGE NURSE ONLY: CPT | Performed by: NURSE PRACTITIONER

## 2023-01-03 NOTE — LETTER
1/3/2023         RE: Karin Santacruz  [address] [place] [address]        Dear Colleague,    Thank you for referring your patient, Karin Santacruz, to the [facility]INE [facility]. Please see a copy of my visit note below.    Patient not seen, rescheduled to to patient arriving at the wrong clinic location.                 Again, thank you for allowing me to participate in the care of your patient.        Sincerely,        Юлия Figueroa, CNP

## 2023-01-04 ENCOUNTER — OFFICE VISIT (OUTPATIENT)
Dept: PHYSICAL MEDICINE AND REHAB | Facility: CLINIC | Age: 53
End: 2023-01-04
Payer: COMMERCIAL

## 2023-01-04 VITALS — OXYGEN SATURATION: 99 % | SYSTOLIC BLOOD PRESSURE: 148 MMHG | HEART RATE: 77 BPM | DIASTOLIC BLOOD PRESSURE: 76 MMHG

## 2023-01-04 DIAGNOSIS — G89.29 CHRONIC LEFT-SIDED LOW BACK PAIN WITH LEFT-SIDED SCIATICA: Primary | ICD-10-CM

## 2023-01-04 DIAGNOSIS — M54.42 CHRONIC LEFT-SIDED LOW BACK PAIN WITH LEFT-SIDED SCIATICA: Primary | ICD-10-CM

## 2023-01-04 DIAGNOSIS — M54.16 LEFT LUMBAR RADICULITIS: ICD-10-CM

## 2023-01-04 DIAGNOSIS — M51.369 ANNULAR TEAR OF LUMBAR DISC: ICD-10-CM

## 2023-01-04 DIAGNOSIS — M48.061 STENOSIS OF LATERAL RECESS OF LUMBAR SPINE: ICD-10-CM

## 2023-01-04 DIAGNOSIS — M51.26 PROTRUSION OF LUMBAR INTERVERTEBRAL DISC: ICD-10-CM

## 2023-01-04 PROCEDURE — 99214 OFFICE O/P EST MOD 30 MIN: CPT | Performed by: NURSE PRACTITIONER

## 2023-01-04 ASSESSMENT — PAIN SCALES - GENERAL: PAINLEVEL: MILD PAIN (3)

## 2023-01-04 NOTE — PROGRESS NOTES
Assessment:     Diagnoses and all orders for this visit:  Chronic left-sided low back pain with left-sided sciatica  -     PAIN Transforaminal GERARD Inj Lumbosacral Left; Future  Left lumbar radiculitis  -     PAIN Transforaminal GERARD Inj Lumbosacral Left; Future  Protrusion of lumbar intervertebral disc  Stenosis of lateral recess of lumbar spine  Annular tear of lumbar disc  -     PAIN Transforaminal GERARD Inj Lumbosacral Left; Future     Karin Santacruz is a 52 year old y.o. female with past medical history significant for CAD, elevated BP without diagnosis hypertension who presents today for follow-up regarding:    -Ongoing LEFT low back pain localizing at the lumbosacral junction rating to the buttock with some consistency with radiculitis, minimal benefit with physical therapy.  MRI with RIGHT disc protrusion at L3-4 however this is much higher and on the opposite side of where her pain is currently located therefore unlikely related to her pain.  The MRI does also show L5-S1 annular tear which could be contributing to her current symptoms.     Plan:     A shared decision making plan was used. The patient's values and choices were respected. Prior medical records were reviewed today. The following represents what was discussed and decided upon by the provider and the patient.        -DIAGNOSTIC TESTS: Images were personally reviewed and interpreted.   -- Lumbar spine MRI 11/21/2022 with right paracentral disc protrusion at L3-4 with lateral recess stenosis and right L4 impingement.  Mild disc height loss at other levels without nerve compression.    -INTERVENTIONS: Ordered a LEFT L5-S1 transforaminal epidural steroid injection to see if we can diagnostically determine where her pain is coming from as well as therapeutically improve her symptoms.  She is having left L5 radicular type symptoms, MRI does show L5-S1 annular tear in the lateral recess.  -If no benefit with this injection we could consider left medial  branch block versus left sacroiliac joint injection however she does not have increased pain with SI provocative maneuvers on exam today, and his pain is slightly higher than the sacroiliac notch.    -MEDICATIONS: No changes in medications today  Discussed side effects of medications and proper use. Patient verbalized understanding.    -PHYSICAL THERAPY: Advised patient to continue with home exercises from recent physical therapy sessions which she is diligent about doing unfortunately with minimal benefit.  Discussed the importance of core strengthening, ROM, stretching exercises with the patient and how each of these entities is important in decreasing pain.  Explained to the patient that the purpose of physical therapy is to teach the patient a home exercise program.  These exercises need to be performed every day in order to decrease pain and prevent future occurrences of pain.        -PATIENT EDUCATION:  Total time of 32 minutes, on the day of service, spent with the patient, reviewing the chart, placing orders, and documenting.   -Today we also discussed the issues related to the current COVID-19 pandemic, the pros and cons of the current treatment plan, the CDC guidelines such as social distancing, washing the hands, and covering the cough.    -FOLLOW UP: Follow-up for injection then 2 weeks postinjection in person only.  Due to her insurance we cannot do a video visit follow-up.  Advised to contact clinic if symptoms worsen or change.    Subjective:     Karin Santacruz is a 52 year old female who presents today for follow-up regarding ongoing chronic left low back pain that localizes to the lumbosacral junction ongoing since August 2022 with no specific injury that had been progressive and severe and unfortunately she has been getting minimal benefit with physical therapy and she is frustrated with the amount of pain that she is in at this time still ongoing.  Currently today her pain is a 3/10 up to a 9 at  its worst aggravated randomly she reports sometimes with walking but also she can sometimes wake up with the pain and sometimes wake up without pain, she states it is random in that sense.  He does give her some benefit.    Patient denies RIGHT low back and denies right leg pain.  Denies lower extremity weakness.  Denies any recent trips or falls or balance changes.  Denies bowel or bladder loss control.  Denies saddle anesthesia.    Patient reports minimal prior history of back pain prior to August onset of symptoms.     -Treatment to Date: No prior spinal surgery or spinal injections  Recent physical therapy [facility] rehab LBP/radiculopathy x2 sessions     -Medications:  Ibuprofen with no benefit  Tylenol with no benefit    Patient Active Problem List   Diagnosis     Family history of early CAD     Family history of renal artery stenosis     Elevated BP without diagnosis of hypertension     Chronic left-sided low back pain with left-sided sciatica     Left lumbar radiculitis       Current Outpatient Medications   Medication     escitalopram (LEXAPRO) 20 MG tablet     hydrOXYzine (VISTARIL) 25 MG capsule     lisinopril (ZESTRIL) 2.5 MG tablet     Vitamin D3 (CHOLECALCIFEROL) 25 mcg (1000 units) tablet     Current Facility-Administered Medications   Medication     triamcinolone (KENALOG-40) injection 40 mg       No Known Allergies    No past medical history on file.     Review of Systems  ROS:  Specifically negative for bowel/bladder dysfunction, balance changes, headache, dizziness, foot drop, fevers, chills, appetite changes, nausea/vomiting, unexplained weight loss. Otherwise 13 systems reviewed are negative. Please see the patient's intake questionnaire from today for details.    Reviewed Social, Family, Past Medical and Past Surgical history with patient, no significant changes noted since prior visit.     Objective:     BP (!) 148/76 (BP Location: Left arm, Patient Position: Sitting)   Pulse 77    SpO2 99%     PHYSICAL EXAMINATION:    --CONSTITUTIONAL: Well developed, well nourished, healthy appearing individual.  --PSYCHIATRIC: Appropriate mood and affect. No difficulty interacting due to temper, social withdrawal, or memory issues.  --SKIN: Lumbar region is dry and intact.   --RESPIRATORY: Normal rhythm and effort. No abnormal accessory muscle breathing patterns noted.   --MUSCULOSKELETAL:  Normal lumbar lordosis noted, no lateral shift.  --GROSS MOTOR: Easily arises from a seated position. Gait is non-antalgic  --LUMBAR SPINE:  Inspection reveals no evidence of deformity. Range of motion is not limited in lumbar flexion, extension, lateral rotation. No tenderness to palpation lumbar spine. Straight leg raising in the supine position is positive to radicular pain on the left. Sciatic notch non-tender, tender on the left at the sacroiliac notch however.   --SACROILIAC JOINT: Sacroiliac Joint Compression Test negative.   --LOWER EXTREMITY MOTOR TESTING:  Plantar flexion left 5/5, right 5/5   Dorsiflexion left 5/5, right 5/5   Knee flexion left 5/5, right 5/5  Knee extension left 5/5, right 5/5   Hip flexion left 5/5, right 5/5  Hip abduction left 5/5, right 5/5  Hip adduction left 5/5, right 5/5   --HIPS: Full range of motion bilaterally.   --NEUROLOGIC: Bilateral patellar and achilles reflexes are physiologic and symmetric. Sensation to light touch is intact bilateral lower extremities.    RESULTS:   Imaging: Spine imaging was reviewed today. The images were shown to the patient and the findings were explained using a spine model.

## 2023-01-04 NOTE — PATIENT INSTRUCTIONS
~Please call our [facility] Nurse Navigation line [phone] with any questions or concerns about your treatment plan, if symptoms worsen and you would like to be seen urgently, or if you have problems controlling bladder and bowel function.       An injection has been ordered today to potentially help with your pain symptoms. These injections do not fix what is going on in your back, therefore they typically do not take away the pain completely, however they can many times help improve symptoms. Injections should always be completed along with other modalities such as physical therapy for the best long term outcomes. If injections alone are done, then pain will likely return.     [facility] Injection Requirements    A  is required for all fluoroscopically-guided injections.  Injection appointments may be cancelled if there are signs/symptoms of an active infection or if the patient is being actively treated with antibiotics for a diagnosed infection.  Patients may have their steroid injection cancelled if they have had another steroid injection within 2 weeks.  Diabetic patients will have their blood glucose levels checked the day of their injection and the appointment will be rescheduled if the blood glucose level is 300 or higher.  Patients with allergies to cortisone, local anesthetics, iodine, or contrast dye should contact the Spine Center to further discuss these considerations.  Patients scheduled for medial branch block diagnostic injections should refrain from taking pain medication the day of the procedure.  The medial branch block injection appointment will be rescheduled if the patient's pain rating is not 5/10 or greater at the time of the procedure.  Patients taking warfarin/Coumadin will have their INR checked the day of the procedure and the procedure may be rescheduled if the INR is greater than 3.0.  Please contact the Spine Center [phone]) if  you are taking any prescription blood-thinning medications (warfarin, Plavix, Lovenox, Eliquis, Brilinta, Effient, etc.) as special dosing adjustments may need to be made depending on the type of injection you are scheduled to receive.  It is recommended that you delay having your steroid injection if you have received a flu shot or shingles vaccine within 2 weeks.       Cost of Care Estimate Phone: [phone]

## 2023-01-04 NOTE — LETTER
1/4/2023         RE: Karin Santacruz  [address] [place] [address]        Dear Colleague,    Thank you for referring your patient, Karin Santacruz, to the [facility]. Please see a copy of my visit note below.      Assessment:     Diagnoses and all orders for this visit:  Chronic left-sided low back pain with left-sided sciatica  -     PAIN Transforaminal GERARD Inj Lumbosacral Left; Future  Left lumbar radiculitis  -     PAIN Transforaminal GERARD Inj Lumbosacral Left; Future  Protrusion of lumbar intervertebral disc  Stenosis of lateral recess of lumbar spine  Annular tear of lumbar disc  -     PAIN Transforaminal GERARD Inj Lumbosacral Left; Future     Karin Santacruz is a 52 year old y.o. female with past medical history significant for CAD, elevated BP without diagnosis hypertension who presents today for follow-up regarding:    -Ongoing LEFT low back pain localizing at the lumbosacral junction rating to the buttock with some consistency with radiculitis, minimal benefit with physical therapy.  MRI with RIGHT disc protrusion at L3-4 however this is much higher and on the opposite side of where her pain is currently located therefore unlikely related to her pain.  The MRI does also show L5-S1 annular tear which could be contributing to her current symptoms.     Plan:     A shared decision making plan was used. The patient's values and choices were respected. Prior medical records were reviewed today. The following represents what was discussed and decided upon by the provider and the patient.        -DIAGNOSTIC TESTS: Images were personally reviewed and interpreted.   -- Lumbar spine MRI 11/21/2022 with right paracentral disc protrusion at L3-4 with lateral recess stenosis and right L4 impingement.  Mild disc height loss at other levels without nerve compression.    -INTERVENTIONS: Ordered a LEFT L5-S1 transforaminal epidural steroid injection to see if we can  diagnostically determine where her pain is coming from as well as therapeutically improve her symptoms.  She is having left L5 radicular type symptoms, MRI does show L5-S1 annular tear in the lateral recess.  -If no benefit with this injection we could consider left medial branch block versus left sacroiliac joint injection however she does not have increased pain with SI provocative maneuvers on exam today, and his pain is slightly higher than the sacroiliac notch.    -MEDICATIONS: No changes in medications today  Discussed side effects of medications and proper use. Patient verbalized understanding.    -PHYSICAL THERAPY: Advised patient to continue with home exercises from recent physical therapy sessions which she is diligent about doing unfortunately with minimal benefit.  Discussed the importance of core strengthening, ROM, stretching exercises with the patient and how each of these entities is important in decreasing pain.  Explained to the patient that the purpose of physical therapy is to teach the patient a home exercise program.  These exercises need to be performed every day in order to decrease pain and prevent future occurrences of pain.        -PATIENT EDUCATION:  Total time of 32 minutes, on the day of service, spent with the patient, reviewing the chart, placing orders, and documenting.   -Today we also discussed the issues related to the current COVID-19 pandemic, the pros and cons of the current treatment plan, the CDC guidelines such as social distancing, washing the hands, and covering the cough.    -FOLLOW UP: Follow-up for injection then 2 weeks postinjection in person only.  Due to her insurance we cannot do a video visit follow-up.  Advised to contact clinic if symptoms worsen or change.    Subjective:     Karin Santacruz is a 52 year old female who presents today for follow-up regarding ongoing chronic left low back pain that localizes to the lumbosacral junction ongoing since August 2022  with no specific injury that had been progressive and severe and unfortunately she has been getting minimal benefit with physical therapy and she is frustrated with the amount of pain that she is in at this time still ongoing.  Currently today her pain is a 3/10 up to a 9 at its worst aggravated randomly she reports sometimes with walking but also she can sometimes wake up with the pain and sometimes wake up without pain, she states it is random in that sense.  He does give her some benefit.    Patient denies RIGHT low back and denies right leg pain.  Denies lower extremity weakness.  Denies any recent trips or falls or balance changes.  Denies bowel or bladder loss control.  Denies saddle anesthesia.    Patient reports minimal prior history of back pain prior to August onset of symptoms.     -Treatment to Date: No prior spinal surgery or spinal injections  [facility]     -Medications:  Ibuprofen with no benefit  Tylenol with no benefit    Patient Active Problem List   Diagnosis     Family history of early CAD     Family history of renal artery stenosis     Elevated BP without diagnosis of hypertension     Chronic left-sided low back pain with left-sided sciatica     Left lumbar radiculitis       Current Outpatient Medications   Medication     escitalopram (LEXAPRO) 20 MG tablet     hydrOXYzine (VISTARIL) 25 MG capsule     lisinopril (ZESTRIL) 2.5 MG tablet     Vitamin D3 (CHOLECALCIFEROL) 25 mcg (1000 units) tablet     Current Facility-Administered Medications   Medication     triamcinolone (KENALOG-40) injection 40 mg       No Known Allergies    No past medical history on file.     Review of Systems  ROS:  Specifically negative for bowel/bladder dysfunction, balance changes, headache, dizziness, foot drop, fevers, chills, appetite changes, nausea/vomiting, unexplained weight loss. Otherwise 13 systems reviewed are negative. Please see the patient's  intake questionnaire from today for details.    Reviewed Social, Family, Past Medical and Past Surgical history with patient, no significant changes noted since prior visit.     Objective:     BP (!) 148/76 (BP Location: Left arm, Patient Position: Sitting)   Pulse 77   SpO2 99%     PHYSICAL EXAMINATION:    --CONSTITUTIONAL: Well developed, well nourished, healthy appearing individual.  --PSYCHIATRIC: Appropriate mood and affect. No difficulty interacting due to temper, social withdrawal, or memory issues.  --SKIN: Lumbar region is dry and intact.   --RESPIRATORY: Normal rhythm and effort. No abnormal accessory muscle breathing patterns noted.   --MUSCULOSKELETAL:  Normal lumbar lordosis noted, no lateral shift.  --GROSS MOTOR: Easily arises from a seated position. Gait is non-antalgic  --LUMBAR SPINE:  Inspection reveals no evidence of deformity. Range of motion is not limited in lumbar flexion, extension, lateral rotation. No tenderness to palpation lumbar spine. Straight leg raising in the supine position is positive to radicular pain on the left. Sciatic notch non-tender, tender on the left at the sacroiliac notch however.   --SACROILIAC JOINT: Sacroiliac Joint Compression Test negative.   --LOWER EXTREMITY MOTOR TESTING:  Plantar flexion left 5/5, right 5/5   Dorsiflexion left 5/5, right 5/5   Knee flexion left 5/5, right 5/5  Knee extension left 5/5, right 5/5   Hip flexion left 5/5, right 5/5  Hip abduction left 5/5, right 5/5  Hip adduction left 5/5, right 5/5   --HIPS: Full range of motion bilaterally.   --NEUROLOGIC: Bilateral patellar and achilles reflexes are physiologic and symmetric. Sensation to light touch is intact bilateral lower extremities.    RESULTS:   Imaging: Spine imaging was reviewed today. The images were shown to the patient and the findings were explained using a spine model.                              Again, thank you for allowing me to participate in the care of your patient.         Sincerely,        Юлия Figueroa, CNP

## 2023-01-06 ENCOUNTER — VIRTUAL VISIT (OUTPATIENT)
Dept: FAMILY MEDICINE | Facility: CLINIC | Age: 53
End: 2023-01-06
Attending: FAMILY MEDICINE
Payer: COMMERCIAL

## 2023-01-06 DIAGNOSIS — F33.42 MAJOR DEPRESSIVE DISORDER, RECURRENT EPISODE, IN FULL REMISSION (H): ICD-10-CM

## 2023-01-06 DIAGNOSIS — I10 BENIGN ESSENTIAL HYPERTENSION: Primary | ICD-10-CM

## 2023-01-06 PROBLEM — R03.0 ELEVATED BP WITHOUT DIAGNOSIS OF HYPERTENSION: Status: RESOLVED | Noted: 2020-03-03 | Resolved: 2023-01-06

## 2023-01-06 PROCEDURE — 99214 OFFICE O/P EST MOD 30 MIN: CPT | Mod: TEL | Performed by: FAMILY MEDICINE

## 2023-01-06 RX ORDER — MULTIPLE VITAMINS W/ MINERALS TAB 9MG-400MCG
1 TAB ORAL DAILY
COMMUNITY

## 2023-01-06 RX ORDER — LISINOPRIL 10 MG/1
10 TABLET ORAL DAILY
Qty: 90 TABLET | Refills: 3 | Status: SHIPPED | OUTPATIENT
Start: 2023-01-06 | End: 2023-01-18 | Stop reason: ALTCHOICE

## 2023-01-06 RX ORDER — UBIDECARENONE 30 MG
1 CAPSULE ORAL DAILY
COMMUNITY

## 2023-01-06 RX ORDER — CHLORAL HYDRATE 500 MG
2 CAPSULE ORAL DAILY
COMMUNITY
End: 2024-07-30

## 2023-01-06 RX ORDER — ESCITALOPRAM OXALATE 20 MG/1
20 TABLET ORAL DAILY
Qty: 90 TABLET | Refills: 3 | Status: SHIPPED | OUTPATIENT
Start: 2023-01-06 | End: 2023-09-12 | Stop reason: DRUGHIGH

## 2023-01-06 NOTE — PROGRESS NOTES
CC: Establish Care (Pt would like to establish care for High BP. Pt reported she is having high BP readings and would like to discuss medication. Last BP reading was 145/85. Its been as high has 176/88 10 days ago. Pt would like a refill on medications ( Lexapro and Lisinopril))      SUBJECTIVE: Karin is a 52 year old female who comes in to establish care. Prior PCP Dr. Manning. Records received and reviewed. Concerns today: Elevated BP readings.     HTN. Long-standing diagnosis.   - Medications: Has been treating with lisinopril 2.5 mg daily. No adverse medication effects. She went to donate blood about 10 days ago and her BP was elevated so she started taking her BP at home. BP was 176/104 today. The lowest reading is 140s/80s. In the last week, she started taking lisinopril 5 mg daily. Today's elevated BP reading was on lisinopril 5 mg daily.   - Most recent surveillance labs reviewed today and are notable for stable creatinine, eGFR, and sodium/potassium (8/2022).   - Patient denies headaches, vision changes, chest pain, cough, SOB, palpitations, LE edema, PND, or orthopnea.  - Working with an online weight loss program. Goal is to lose 4 lb in January. Walks about 3 miles/day. Plans to start strength training 2x/week.   - Does drink 2 Diet Cokes per day.   - Has 2 alcohol drinks/week.   - No tobacco use or drug use.     Depression/anxiety. Long-standing diagnosis.   - Medications: Has been treating with escitalopram 20 mg daily. No adverse medication effects.   - Patient denies changes in mood or affect, anhedonia, appetite disturbance, concentration difficulty, delusions, hallucinations, suicidal ideation, self-harm.   - Denies drug or alcohol abuse.   - Also uses hydroxyzine as needed for sleep.     History of iron deficiency. Does donate blood regularly. Takes an iron supplement. Helps with energy levels. Hasn't menstruated in 9-10 months. Colonoscopy in 9/2020 showed 3 polyps, non-bleeding internal  hemorrhoids, and diverticulosis in sigmoid, descending, and transverse colon.     PMH, PSH, allergies, and medications reviewed and updated in Epic.       OBJECTIVE:   General: Alert and oriented in no acute distress.  Psych: Mood and affect appropriate.      ASSESSMENT/PLAN:   Karin was seen today for establish care.    Benign essential hypertension  Not controlled on lisinopril 5 mg daily. Increase lisinopril to 10 mg daily. Check BMP in 1-2 weeks. Continue to check home BP and send home BP readings via [employer]. Goal /80.   -     Basic Metabolic Panel; Future  -     lisinopril (ZESTRIL) 10 MG tablet; Take 1 tablet (10 mg) by mouth daily    Major depressive disorder, recurrent episode, in full remission (H)  The current medical regimen is effective;  continue present plan and medications.  -     escitalopram (LEXAPRO) 20 MG tablet; Take 1 tablet (20 mg) by mouth daily      Return in about 2 weeks (around 1/20/2023) for blood pressure .    Shahla Billings MD  Family Medicine    Telemedicine Visit: The patient's condition can be safely assessed and treated via telemedicine encounter.      Distant Location (provider location):  Off-site    Consent:  The patient/guardian has verbally consented to: the potential risks and benefits of telemedicine (video visit) versus in person care; bill my insurance or make self-payment for services provided; and responsibility for payment of non-covered services.     Mode of Communication:  Phone Visit   Phone Call Duration: 20 minutes    As the provider I attest to compliance with applicable laws and regulations related to telemedicine.

## 2023-01-06 NOTE — NURSING NOTE
Chief Complaint   Patient presents with     Establish Care     Pt would like to establish care for High BP. Pt reported she is having high BP readings and would like to discuss medication. Last BP reading was 145/85. Its been as high has 176/88 10 days ago. Pt would like a refill on medications ( Lexapro and Lisinopril)

## 2023-01-17 ENCOUNTER — MYC MEDICAL ADVICE (OUTPATIENT)
Dept: PHYSICAL MEDICINE AND REHAB | Facility: CLINIC | Age: 53
End: 2023-01-17
Payer: COMMERCIAL

## 2023-01-17 ENCOUNTER — MYC MEDICAL ADVICE (OUTPATIENT)
Dept: FAMILY MEDICINE | Facility: CLINIC | Age: 53
End: 2023-01-17
Payer: COMMERCIAL

## 2023-01-17 DIAGNOSIS — Z13.220 SCREENING FOR CHOLESTEROL LEVEL: Primary | ICD-10-CM

## 2023-01-17 DIAGNOSIS — I10 BENIGN ESSENTIAL HYPERTENSION: ICD-10-CM

## 2023-01-18 ENCOUNTER — LAB (OUTPATIENT)
Dept: LAB | Facility: CLINIC | Age: 53
End: 2023-01-18
Payer: COMMERCIAL

## 2023-01-18 DIAGNOSIS — Z13.220 SCREENING FOR CHOLESTEROL LEVEL: ICD-10-CM

## 2023-01-18 DIAGNOSIS — I10 BENIGN ESSENTIAL HYPERTENSION: ICD-10-CM

## 2023-01-18 LAB
ANION GAP SERPL CALCULATED.3IONS-SCNC: 7 MMOL/L (ref 7–15)
BUN SERPL-MCNC: 10 MG/DL (ref 6–20)
CALCIUM SERPL-MCNC: 9.3 MG/DL (ref 8.6–10)
CHLORIDE SERPL-SCNC: 105 MMOL/L (ref 98–107)
CHOLEST SERPL-MCNC: 268 MG/DL
CREAT SERPL-MCNC: 0.73 MG/DL (ref 0.51–0.95)
DEPRECATED HCO3 PLAS-SCNC: 29 MMOL/L (ref 22–29)
GFR SERPL CREATININE-BSD FRML MDRD: >90 ML/MIN/1.73M2
GLUCOSE SERPL-MCNC: 92 MG/DL (ref 70–99)
HDLC SERPL-MCNC: 48 MG/DL
LDLC SERPL CALC-MCNC: 179 MG/DL
NONHDLC SERPL-MCNC: 220 MG/DL
POTASSIUM SERPL-SCNC: 4.7 MMOL/L (ref 3.4–5.3)
SODIUM SERPL-SCNC: 141 MMOL/L (ref 136–145)
TRIGL SERPL-MCNC: 204 MG/DL

## 2023-01-18 PROCEDURE — 36415 COLL VENOUS BLD VENIPUNCTURE: CPT | Performed by: PATHOLOGY

## 2023-01-18 PROCEDURE — 80061 LIPID PANEL: CPT | Performed by: PATHOLOGY

## 2023-01-18 PROCEDURE — 80048 BASIC METABOLIC PNL TOTAL CA: CPT | Performed by: PATHOLOGY

## 2023-01-18 RX ORDER — LISINOPRIL/HYDROCHLOROTHIAZIDE 10-12.5 MG
1 TABLET ORAL DAILY
Qty: 90 TABLET | Refills: 0 | Status: SHIPPED | OUTPATIENT
Start: 2023-01-18 | End: 2023-03-20

## 2023-01-20 ENCOUNTER — RADIOLOGY INJECTION OFFICE VISIT (OUTPATIENT)
Dept: PHYSICAL MEDICINE AND REHAB | Facility: CLINIC | Age: 53
End: 2023-01-20
Attending: NURSE PRACTITIONER
Payer: COMMERCIAL

## 2023-01-20 VITALS
DIASTOLIC BLOOD PRESSURE: 74 MMHG | TEMPERATURE: 97.9 F | SYSTOLIC BLOOD PRESSURE: 124 MMHG | HEART RATE: 71 BPM | RESPIRATION RATE: 16 BRPM | OXYGEN SATURATION: 97 %

## 2023-01-20 DIAGNOSIS — M54.16 LEFT LUMBAR RADICULITIS: ICD-10-CM

## 2023-01-20 DIAGNOSIS — M51.369 ANNULAR TEAR OF LUMBAR DISC: ICD-10-CM

## 2023-01-20 DIAGNOSIS — M54.42 CHRONIC LEFT-SIDED LOW BACK PAIN WITH LEFT-SIDED SCIATICA: ICD-10-CM

## 2023-01-20 DIAGNOSIS — G89.29 CHRONIC LEFT-SIDED LOW BACK PAIN WITH LEFT-SIDED SCIATICA: ICD-10-CM

## 2023-01-20 PROCEDURE — 64483 NJX AA&/STRD TFRM EPI L/S 1: CPT | Mod: LT | Performed by: PAIN MEDICINE

## 2023-01-20 RX ORDER — LIDOCAINE HYDROCHLORIDE 10 MG/ML
INJECTION, SOLUTION EPIDURAL; INFILTRATION; INTRACAUDAL; PERINEURAL
Status: COMPLETED | OUTPATIENT
Start: 2023-01-20 | End: 2023-01-20

## 2023-01-20 RX ORDER — DEXAMETHASONE SODIUM PHOSPHATE 10 MG/ML
INJECTION, SOLUTION INTRAMUSCULAR; INTRAVENOUS
Status: COMPLETED | OUTPATIENT
Start: 2023-01-20 | End: 2023-01-20

## 2023-01-20 RX ADMIN — DEXAMETHASONE SODIUM PHOSPHATE 10 MG: 10 INJECTION, SOLUTION INTRAMUSCULAR; INTRAVENOUS at 08:32

## 2023-01-20 RX ADMIN — LIDOCAINE HYDROCHLORIDE 2 ML: 10 INJECTION, SOLUTION EPIDURAL; INFILTRATION; INTRACAUDAL; PERINEURAL at 08:32

## 2023-01-20 ASSESSMENT — PAIN SCALES - GENERAL
PAINLEVEL: MILD PAIN (2)
PAINLEVEL: NO PAIN (0)

## 2023-01-20 NOTE — PATIENT INSTRUCTIONS
Follow-up visit with Юлия Figueroa CNP in 2 weeks to discuss injection outcome and determine care plan going forward.       DISCHARGE INSTRUCTIONS    During office hours (8:00 a.m.- 4:00 p.m.) questions or concerns may be answered  by calling Spine Center Navigation Nurses at  [phone].  Messages received after hours will be returned the following business day.      In the case of an emergency, please dial 911 or seek assistance at the nearest Emergency Room/Urgent Care facility.     All Patients:    You may experience an increase in your symptoms for the first 2 days (It may take anywhere between 2 days- 2 weeks for the steroid to have maximum effect).    You may use ice on the injection site, as frequently as 20 minutes each hour if needed.    You may take your pain medicine.    You may continue taking your regular medication after your injection. If you have had a Medial Branch Block you may resume pain medication once your pain diary is completed.    You may shower. No swimming, tub bath or hot tub for 48 hours.  You may remove your bandaid/bandage as soon as you are home.    You may resume light activities, as tolerated.    Resume your usual diet as tolerated.    It is strongly advised that you do not drive for 1-3 hours post injection.    If you have had oral sedation:  Do not drive for 8 hours post injection.      If you have had IV sedation:  Do not drive for 24 hours post injection.  Do not operate hazardous machinery or make important personal/business decisions for 24 hours.      POSSIBLE STEROID SIDE EFFECTS (If steroid/cortisone was used for your procedure)    -If you experience these symptoms, it should only last for a short period    Swelling of the legs              Skin redness (flushing)     Mouth (oral) irritation   Blood sugar (glucose) levels            Sweats                    Mood changes  Headache  Sleeplessness  Weakened immune system for up to 14 days, which could increase the  risk of melonie the COVID-19 virus and/or experiencing more severe symptoms of the disease, if exposed.  Decreased effectiveness of the flu vaccine if given within 2 weeks of the steroid.         POSSIBLE PROCEDURE SIDE EFFECTS  -Call the Spine Center if you are concerned  Increased Pain           Increased numbness/tingling      Nausea/Vomiting          Bruising/bleeding at site      Redness or swelling                                              Difficulty walking      Weakness           Fever greater than 100.5    *In the event of a severe headache after an epidural steroid injection that is relieved by lying down, please call the [facility] to speak with a clinical staff member*

## 2023-02-01 ENCOUNTER — OFFICE VISIT (OUTPATIENT)
Dept: FAMILY MEDICINE | Facility: CLINIC | Age: 53
End: 2023-02-01
Payer: COMMERCIAL

## 2023-02-01 VITALS
WEIGHT: 198 LBS | BODY MASS INDEX: 33.8 KG/M2 | SYSTOLIC BLOOD PRESSURE: 116 MMHG | HEIGHT: 64 IN | OXYGEN SATURATION: 98 % | TEMPERATURE: 96.5 F | DIASTOLIC BLOOD PRESSURE: 74 MMHG | HEART RATE: 67 BPM

## 2023-02-01 DIAGNOSIS — B37.31 YEAST INFECTION OF THE VAGINA: ICD-10-CM

## 2023-02-01 DIAGNOSIS — I10 BENIGN ESSENTIAL HYPERTENSION: ICD-10-CM

## 2023-02-01 DIAGNOSIS — R30.0 DYSURIA: Primary | ICD-10-CM

## 2023-02-01 LAB
ALBUMIN UR-MCNC: NEGATIVE MG/DL
APPEARANCE UR: CLEAR
BACTERIA #/AREA URNS HPF: ABNORMAL /HPF
BILIRUB UR QL STRIP: NEGATIVE
CLUE CELLS: ABNORMAL
COLOR UR AUTO: YELLOW
GLUCOSE UR STRIP-MCNC: NEGATIVE MG/DL
HGB UR QL STRIP: NEGATIVE
KETONES UR STRIP-MCNC: NEGATIVE MG/DL
LEUKOCYTE ESTERASE UR QL STRIP: ABNORMAL
NITRATE UR QL: NEGATIVE
PH UR STRIP: 5 [PH] (ref 5–7)
RBC #/AREA URNS AUTO: ABNORMAL /HPF
SP GR UR STRIP: <=1.005 (ref 1–1.03)
SQUAMOUS #/AREA URNS AUTO: ABNORMAL /LPF
TRICHOMONAS, WET PREP: ABNORMAL
UROBILINOGEN UR STRIP-ACNC: 0.2 E.U./DL
WBC #/AREA URNS AUTO: ABNORMAL /HPF
WBC'S/HIGH POWER FIELD, WET PREP: ABNORMAL
YEAST, WET PREP: PRESENT

## 2023-02-01 PROCEDURE — 87210 SMEAR WET MOUNT SALINE/INK: CPT | Performed by: NURSE PRACTITIONER

## 2023-02-01 PROCEDURE — 81001 URINALYSIS AUTO W/SCOPE: CPT | Performed by: NURSE PRACTITIONER

## 2023-02-01 PROCEDURE — 99214 OFFICE O/P EST MOD 30 MIN: CPT | Performed by: NURSE PRACTITIONER

## 2023-02-01 RX ORDER — FLUCONAZOLE 150 MG/1
150 TABLET ORAL ONCE
Qty: 1 TABLET | Refills: 0 | Status: SHIPPED | OUTPATIENT
Start: 2023-02-01 | End: 2023-02-01

## 2023-02-01 ASSESSMENT — PATIENT HEALTH QUESTIONNAIRE - PHQ9
SUM OF ALL RESPONSES TO PHQ QUESTIONS 1-9: 0
10. IF YOU CHECKED OFF ANY PROBLEMS, HOW DIFFICULT HAVE THESE PROBLEMS MADE IT FOR YOU TO DO YOUR WORK, TAKE CARE OF THINGS AT HOME, OR GET ALONG WITH OTHER PEOPLE: NOT DIFFICULT AT ALL
SUM OF ALL RESPONSES TO PHQ QUESTIONS 1-9: 0

## 2023-02-01 ASSESSMENT — PAIN SCALES - GENERAL: PAINLEVEL: NO PAIN (0)

## 2023-02-01 NOTE — RESULT ENCOUNTER NOTE
Elier Frazier,   Your wet prep shows yeast, I will send oral fluconazole to your pharmacy. It is 1 dose. If Friday comes and you feel you need another let me know, but one dose should take care of this.   Thank you,  RANDOLPH Castellanos, NP-C  [facility]

## 2023-02-01 NOTE — RESULT ENCOUNTER NOTE
Elier Frazier,   Urine is not really positive for UTI. Let's see what the wet prep shows and go from there. Push fluids, try 100% cranberry juice and keep me posted if things worsen.  Thank you,  RANDOLPH Castellanos, NP-C  [facility]

## 2023-02-01 NOTE — Clinical Note
Here for UTI today, BP is much improved RANDOLPH Mcbride, NP-C [facility]

## 2023-02-01 NOTE — PROGRESS NOTES
"  Assessment & Plan     Dysuria  UA negative, see below  - UA Macro with Reflex to Micro and Culture - lab collect  - Urine Microscopic  - Wet prep - lab collect; Future  - Wet prep - lab collect    Benign essential hypertension  BP much better with new medication    Yeast infection of the vagina  Wet prep shows yeast, will treat. Call Friday if still having odor, can do one more dose then  - fluconazole (DIFLUCAN) 150 MG tablet; Take 1 tablet (150 mg) by mouth once for 1 dose      BMI:   Estimated body mass index is 33.99 kg/m  as calculated from the following:    Height as of this encounter: 1.626 m (5' 4\").    Weight as of this encounter: 89.8 kg (198 lb).     Return in about 1 week (around 2/8/2023), or if symptoms worsen or fail to improve.    RANDOLPH Castellanos, NP-C  [facility]    Donn Frazier is a 53 year old accompanied by her self, presenting for the following health issues:  UTI      UTI    History of Present Illness       Reason for visit:  ; think i have a UTI  Symptom onset:  3-7 days ago  Symptoms include:  Urine is smelling weird - like spoiled milk  Symptom intensity:  Moderate  Symptom progression:  Staying the same  Had these symptoms before:  No  What makes it worse:  No  What makes it better:  No    She eats 2-3 servings of fruits and vegetables daily.She consumes 0 sweetened beverage(s) daily.She exercises with enough effort to increase her heart rate 20 to 29 minutes per day.  She exercises with enough effort to increase her heart rate 3 or less days per week.   She is taking medications regularly.    Today's PHQ-9         PHQ-9 Total Score: 0    PHQ-9 Q9 Thoughts of better off dead/self-harm past 2 weeks :   Not at all    How difficult have these problems made it for you to do your work, take care of things at home, or get along with other people: Not difficult at all             Review of Systems   Constitutional, cardiovascular, pulmonary, gi and gu-as above " "systems are negative, except as otherwise noted.      Objective    /74   Pulse 67   Temp (!) 96.5  F (35.8  C) (Tympanic)   Ht 1.626 m (5' 4\")   Wt 89.8 kg (198 lb)   LMP 02/10/2022 (Exact Date)   SpO2 98%   BMI 33.99 kg/m    Body mass index is 33.99 kg/m .  Physical Exam   GENERAL: healthy, alert and no distress  RESP: lungs clear to auscultation - no rales, rhonchi or wheezes  CV: regular rate and rhythm, normal S1 S2, no S3 or S4, no murmur, click or rub  ABDOMEN: soft, nontender, no hepatosplenomegaly, no masses and bowel sounds normal  MS: no gross musculoskeletal defects noted, no edema    Results for orders placed or performed in visit on 02/01/23 (from the past 24 hour(s))   UA Macro with Reflex to Micro and Culture - lab collect    Specimen: Urine, Clean Catch   Result Value Ref Range    Color Urine Yellow Colorless, Straw, Light Yellow, Yellow    Appearance Urine Clear Clear    Glucose Urine Negative Negative mg/dL    Bilirubin Urine Negative Negative    Ketones Urine Negative Negative mg/dL    Specific Gravity Urine <=1.005 1.003 - 1.035    Blood Urine Negative Negative    pH Urine 5.0 5.0 - 7.0    Protein Albumin Urine Negative Negative mg/dL    Urobilinogen Urine 0.2 0.2, 1.0 E.U./dL    Nitrite Urine Negative Negative    Leukocyte Esterase Urine Trace (A) Negative   Urine Microscopic   Result Value Ref Range    Bacteria Urine Few (A) None Seen /HPF    RBC Urine 0-2 0-2 /HPF /HPF    WBC Urine 0-5 0-5 /HPF /HPF    Squamous Epithelials Urine Few (A) None Seen /LPF    Narrative    Urine Culture not indicated   Wet prep - lab collect    Specimen: Vagina; Swab   Result Value Ref Range    Trichomonas Absent Absent    Yeast Present (A) Absent    Clue Cells Absent Absent    WBCs/high power field 3+ (A) None                 "

## 2023-03-02 NOTE — PROGRESS NOTES
Assessment:     Diagnoses and all orders for this visit:  Chronic left-sided low back pain with left-sided sciatica  Left lumbar radiculitis  Protrusion of lumbar intervertebral disc  Annular tear of lumbar disc  Stenosis of lateral recess of lumbar spine     Karin Santacruz is a 53 year old y.o. female with past medical history significant for CAD, elevated BP without diagnosis hypertension who presents today for follow-up regarding:    -Left low back pain localizing to the lumbosacral junction with significant initial relief with left L5-S1 TFESI indicating positive diagnostic test, improvement in symptoms with physical therapy and injection about 4-6 weeks postinjection.  Patient denies RIGHT low back pain.  Denies radicular component.     Plan:     A shared decision making plan was used. The patient's values and choices were respected. Prior medical records were reviewed today. The following represents what was discussed and decided upon by the provider and the patient.        -DIAGNOSTIC TESTS: Images were personally reviewed and interpreted.   -- Lumbar spine MRI 11/21/2022 with right paracentral disc protrusion at L3-4 with lateral recess stenosis and right L4 impingement.  Mild disc height loss at other levels without nerve compression.    -INTERVENTIONS: Could consider repeat injection down the road if needed, currently doing well.    -MEDICATIONS: No changes in medications at this time as she is doing well did  Discussed side effects of medications and proper use. Patient verbalized understanding.    -PHYSICAL THERAPY: Advise patient to continue with home exercises from prior physical therapy sessions as tolerated.  Discussed the importance of core strengthening, ROM, stretching exercises with the patient and how each of these entities is important in decreasing pain.  Explained to the patient that the purpose of physical therapy is to teach the patient a home exercise program.  These exercises need to  be performed every day in order to decrease pain and prevent future occurrences of pain.        -PATIENT EDUCATION:  Total time of 28 minutes, on the day of service, spent with the patient, reviewing the chart, placing orders, and documenting.   -Today we also discussed the issues related to the current COVID-19 pandemic, the pros and cons of the current treatment plan, the CDC guidelines such as social distancing, washing the hands, and covering the cough.    -FOLLOW UP: Follow-up as needed  Advised to contact clinic if symptoms worsen or change.    Subjective:     Karin Santacruz is a 53 year old female who presents today for follow-up regarding ongoing left low back pain which is significantly improved at this time since her previous visit in January.  She did report significant relief for the first 2 hours with epidural steroid injection however then her pain returned and patient felt it took 4 to 6 weeks for symptoms to improve, with a combination of physical therapy and the injection she is doing really well at this time.  Currently her pain is a 1/10.  Up to a 5 at its worse with walking more than 15 minutes but overall she is very happy with the response and doing much better in comparison to where she was doing before.  Currently her pain is tolerable, she is continuing with home exercises and working with a  as well at this time.  Patient denies history of right low back pain.  Denies lower extremity pain.  Denies numbness or tingling sensations.  Denies lower extremity weakness.    Patient reports minimal prior history of back pain prior to August onset of symptoms.     -Treatment to Date: No prior spinal surgery   [facility]    Left L5-S1 TFESI 1/20/2023 with relief after 4 to 6 weeks.  Preprocedure pain 2/10, posterior/10.     -Medications:  Ibuprofen with no benefit  Tylenol with no benefit    Patient Active Problem List    Diagnosis     Family history of early CAD     Family history of renal artery stenosis     Chronic left-sided low back pain with left-sided sciatica     Left lumbar radiculitis     Benign essential hypertension     Major depressive disorder, recurrent episode, in full remission (H)       Current Outpatient Medications   Medication     coenzyme Q-10 capsule     escitalopram (LEXAPRO) 20 MG tablet     ferrous sulfate 140 (45 Fe) MG TBCR CR tablet     fish oil-omega-3 fatty acids 1000 MG capsule     lisinopril-hydrochlorothiazide (ZESTORETIC) 10-12.5 MG tablet     multivitamin w/minerals (THERA-VIT-M) tablet     Vitamin D3 (CHOLECALCIFEROL) 25 mcg (1000 units) tablet     Current Facility-Administered Medications   Medication     triamcinolone (KENALOG-40) injection 40 mg       No Known Allergies    Past Medical History:   Diagnosis Date     Benign essential hypertension 1/6/2023        Review of Systems  ROS:  Specifically negative for bowel/bladder dysfunction, balance changes, headache, dizziness, foot drop, fevers, chills, appetite changes, nausea/vomiting, unexplained weight loss. Otherwise 13 systems reviewed are negative. Please see the patient's intake questionnaire from today for details.    Reviewed Social, Family, Past Medical and Past Surgical history with patient, no significant changes noted since prior visit.     Objective:     /57 (BP Location: Right arm, Patient Position: Sitting)   Pulse 74   LMP 01/05/2022   SpO2 100%     PHYSICAL EXAMINATION:    --CONSTITUTIONAL: Well developed, well nourished, healthy appearing individual.  --PSYCHIATRIC: Appropriate mood and affect. No difficulty interacting due to temper, social withdrawal, or memory issues.  --SKIN: Lumbar region is dry and intact.   --RESPIRATORY: Normal rhythm and effort. No abnormal accessory muscle breathing patterns noted.   --MUSCULOSKELETAL:  Normal lumbar lordosis noted, no lateral shift.  --GROSS MOTOR: Easily arises from a  seated position. Gait is non-antalgic  --LUMBAR SPINE:  Inspection reveals no evidence of deformity.   RESULTS:   Imaging: Spine imaging was reviewed today. The images were shown to the patient and the findings were explained using a spine model.

## 2023-03-06 ENCOUNTER — OFFICE VISIT (OUTPATIENT)
Dept: PHYSICAL MEDICINE AND REHAB | Facility: CLINIC | Age: 53
End: 2023-03-06
Payer: COMMERCIAL

## 2023-03-06 VITALS — DIASTOLIC BLOOD PRESSURE: 57 MMHG | HEART RATE: 74 BPM | OXYGEN SATURATION: 100 % | SYSTOLIC BLOOD PRESSURE: 121 MMHG

## 2023-03-06 DIAGNOSIS — M51.26 PROTRUSION OF LUMBAR INTERVERTEBRAL DISC: ICD-10-CM

## 2023-03-06 DIAGNOSIS — M51.369 ANNULAR TEAR OF LUMBAR DISC: ICD-10-CM

## 2023-03-06 DIAGNOSIS — M54.42 CHRONIC LEFT-SIDED LOW BACK PAIN WITH LEFT-SIDED SCIATICA: Primary | ICD-10-CM

## 2023-03-06 DIAGNOSIS — M48.061 STENOSIS OF LATERAL RECESS OF LUMBAR SPINE: ICD-10-CM

## 2023-03-06 DIAGNOSIS — M54.16 LEFT LUMBAR RADICULITIS: ICD-10-CM

## 2023-03-06 DIAGNOSIS — G89.29 CHRONIC LEFT-SIDED LOW BACK PAIN WITH LEFT-SIDED SCIATICA: Primary | ICD-10-CM

## 2023-03-06 PROCEDURE — 99213 OFFICE O/P EST LOW 20 MIN: CPT | Performed by: NURSE PRACTITIONER

## 2023-03-06 ASSESSMENT — PAIN SCALES - GENERAL: PAINLEVEL: NO PAIN (1)

## 2023-03-06 NOTE — PATIENT INSTRUCTIONS
~Please call our [facility] Nurse Navigation line [phone] with any questions or concerns about your treatment plan, if symptoms worsen and you would like to be seen urgently, or if you have problems controlling bladder and bowel function.  ~Please note that any My Chart messages may take multiple days for a response due to the high volume of patients seen in clinic.   Anything sent Thursday night or after will be answered the following week when able, as Юлия Figueroa CNP does not work in clinic on Fridays.        Importance of specialized Physical Therapy: Discussed the importance of core strengthening, ROM, stretching exercises with the patient and how each of these entities is important in decreasing pain.  Explained to the patient that the purpose of physical therapy is to teach the patient a home exercise program individualized to them and their specific health concerns.  These exercises need to be performed every day in order to decrease pain and prevent future occurrences of pain.

## 2023-03-06 NOTE — LETTER
3/6/2023         RE: Karin Santacruz  [address] [place] [address]        Dear Colleague,    Thank you for referring your patient, Karin Santacruz, to the [facility]. Please see a copy of my visit note below.      Assessment:     Diagnoses and all orders for this visit:  Chronic left-sided low back pain with left-sided sciatica  Left lumbar radiculitis  Protrusion of lumbar intervertebral disc  Annular tear of lumbar disc  Stenosis of lateral recess of lumbar spine     Karin Santacruz is a 53 year old y.o. female with past medical history significant for CAD, elevated BP without diagnosis hypertension who presents today for follow-up regarding:    -Left low back pain localizing to the lumbosacral junction with significant initial relief with left L5-S1 TFESI indicating positive diagnostic test, improvement in symptoms with physical therapy and injection about 4-6 weeks postinjection.  Patient denies RIGHT low back pain.  Denies radicular component.     Plan:     A shared decision making plan was used. The patient's values and choices were respected. Prior medical records were reviewed today. The following represents what was discussed and decided upon by the provider and the patient.        -DIAGNOSTIC TESTS: Images were personally reviewed and interpreted.   -- Lumbar spine MRI 11/21/2022 with right paracentral disc protrusion at L3-4 with lateral recess stenosis and right L4 impingement.  Mild disc height loss at other levels without nerve compression.    -INTERVENTIONS: Could consider repeat injection down the road if needed, currently doing well.    -MEDICATIONS: No changes in medications at this time as she is doing well did  Discussed side effects of medications and proper use. Patient verbalized understanding.    -PHYSICAL THERAPY: Advise patient to continue with home exercises from prior physical therapy sessions as tolerated.  Discussed the importance of core  strengthening, ROM, stretching exercises with the patient and how each of these entities is important in decreasing pain.  Explained to the patient that the purpose of physical therapy is to teach the patient a home exercise program.  These exercises need to be performed every day in order to decrease pain and prevent future occurrences of pain.        -PATIENT EDUCATION:  Total time of 28 minutes, on the day of service, spent with the patient, reviewing the chart, placing orders, and documenting.   -Today we also discussed the issues related to the current COVID-19 pandemic, the pros and cons of the current treatment plan, the CDC guidelines such as social distancing, washing the hands, and covering the cough.    -FOLLOW UP: Follow-up as needed  Advised to contact clinic if symptoms worsen or change.    Subjective:     Karin Santacruz is a 53 year old female who presents today for follow-up regarding ongoing left low back pain which is significantly improved at this time since her previous visit in January.  She did report significant relief for the first 2 hours with epidural steroid injection however then her pain returned and patient felt it took 4 to 6 weeks for symptoms to improve, with a combination of physical therapy and the injection she is doing really well at this time.  Currently her pain is a 1/10.  Up to a 5 at its worse with walking more than 15 minutes but overall she is very happy with the response and doing much better in comparison to where she was doing before.  Currently her pain is tolerable, she is continuing with home exercises and working with a  as well at this time.  Patient denies history of right low back pain.  Denies lower extremity pain.  Denies numbness or tingling sensations.  Denies lower extremity weakness.    Patient reports minimal prior history of back pain prior to August onset of symptoms.     -Treatment to Date: No prior spinal surgery   Recent physical therapy  [facility] rehab LBP/radiculopathy x2 sessions    Left L5-S1 TFESI 1/20/2023 with relief after 4 to 6 weeks.  Preprocedure pain 2/10, posterior/10.     -Medications:  Ibuprofen with no benefit  Tylenol with no benefit    Patient Active Problem List   Diagnosis     Family history of early CAD     Family history of renal artery stenosis     Chronic left-sided low back pain with left-sided sciatica     Left lumbar radiculitis     Benign essential hypertension     Major depressive disorder, recurrent episode, in full remission (H)       Current Outpatient Medications   Medication     coenzyme Q-10 capsule     escitalopram (LEXAPRO) 20 MG tablet     ferrous sulfate 140 (45 Fe) MG TBCR CR tablet     fish oil-omega-3 fatty acids 1000 MG capsule     lisinopril-hydrochlorothiazide (ZESTORETIC) 10-12.5 MG tablet     multivitamin w/minerals (THERA-VIT-M) tablet     Vitamin D3 (CHOLECALCIFEROL) 25 mcg (1000 units) tablet     Current Facility-Administered Medications   Medication     triamcinolone (KENALOG-40) injection 40 mg       No Known Allergies    Past Medical History:   Diagnosis Date     Benign essential hypertension 1/6/2023        Review of Systems  ROS:  Specifically negative for bowel/bladder dysfunction, balance changes, headache, dizziness, foot drop, fevers, chills, appetite changes, nausea/vomiting, unexplained weight loss. Otherwise 13 systems reviewed are negative. Please see the patient's intake questionnaire from today for details.    Reviewed Social, Family, Past Medical and Past Surgical history with patient, no significant changes noted since prior visit.     Objective:     /57 (BP Location: Right arm, Patient Position: Sitting)   Pulse 74   LMP 01/05/2022   SpO2 100%     PHYSICAL EXAMINATION:    --CONSTITUTIONAL: Well developed, well nourished, healthy appearing individual.  --PSYCHIATRIC: Appropriate mood and affect. No difficulty interacting due to temper, social withdrawal, or memory  issues.  --SKIN: Lumbar region is dry and intact.   --RESPIRATORY: Normal rhythm and effort. No abnormal accessory muscle breathing patterns noted.   --MUSCULOSKELETAL:  Normal lumbar lordosis noted, no lateral shift.  --GROSS MOTOR: Easily arises from a seated position. Gait is non-antalgic  --LUMBAR SPINE:  Inspection reveals no evidence of deformity.   RESULTS:   Imaging: Spine imaging was reviewed today. The images were shown to the patient and the findings were explained using a spine model.                              Again, thank you for allowing me to participate in the care of your patient.        Sincerely,        Юлия Figueroa, CNP

## 2023-03-16 ENCOUNTER — VIRTUAL VISIT (OUTPATIENT)
Dept: FAMILY MEDICINE | Facility: CLINIC | Age: 53
End: 2023-03-16
Payer: COMMERCIAL

## 2023-03-16 ENCOUNTER — LAB (OUTPATIENT)
Dept: LAB | Facility: CLINIC | Age: 53
End: 2023-03-16
Attending: PHYSICIAN ASSISTANT
Payer: COMMERCIAL

## 2023-03-16 DIAGNOSIS — J02.9 SORE THROAT: Primary | ICD-10-CM

## 2023-03-16 DIAGNOSIS — J02.9 SORE THROAT: ICD-10-CM

## 2023-03-16 LAB
DEPRECATED S PYO AG THROAT QL EIA: NEGATIVE
GROUP A STREP BY PCR: NOT DETECTED

## 2023-03-16 PROCEDURE — 99213 OFFICE O/P EST LOW 20 MIN: CPT | Mod: VID | Performed by: PHYSICIAN ASSISTANT

## 2023-03-16 PROCEDURE — 87651 STREP A DNA AMP PROBE: CPT

## 2023-03-16 RX ORDER — PENICILLIN V POTASSIUM 500 MG/1
500 TABLET, FILM COATED ORAL 2 TIMES DAILY
Qty: 20 TABLET | Refills: 0 | Status: SHIPPED | OUTPATIENT
Start: 2023-03-16 | End: 2023-03-20

## 2023-03-16 NOTE — PROGRESS NOTES
"Karin is a 53 year old who is being evaluated via a billable video visit.      How would you like to obtain your AVS? MyChart  If the video visit is dropped, the invitation should be resent by: Text to cell phone: [phone]  Will anyone else be joining your video visit? No          Assessment & Plan   Problem List Items Addressed This Visit    None  Visit Diagnoses     Sore throat    -  Primary    Relevant Orders    Streptococcus A Rapid Screen w/Reflex to PCR - Clinic Collect         Sore throat, body aches, chills and exposure to strep - we will get a strep test and if positive send Pen VK               BMI:   Estimated body mass index is 33.99 kg/m  as calculated from the following:    Height as of 2/1/23: 1.626 m (5' 4\").    Weight as of 2/1/23: 89.8 kg (198 lb).           Return today (on 3/16/2023) for Lab Only Appointment.    Marisa Duke PA-C  [facility]   Karin is a 53 year old, presenting for the following health issues:  Pharyngitis      History of Present Illness       Reason for visit:  I think i have strep  Symptom onset:  1-3 days ago  Symptoms include:  Sore throat, fatigue, body aches  Symptom intensity:  Moderate  Symptom progression:  Worsening  Had these symptoms before:  Yes  Has tried/received treatment for these symptoms:  Yes  Previous treatment was successful:  Yes  Prior treatment description:  Herlinda had strep before - feels like that; trated with antibiotics  What makes it worse:  Talking; extertion  What makes it better:  Rest    She eats 2-3 servings of fruits and vegetables daily.She consumes 0 sweetened beverage(s) daily.She exercises with enough effort to increase her heart rate 20 to 29 minutes per day.  She exercises with enough effort to increase her heart rate 4 days per week.   She is taking medications regularly.             Review of Systems         Objective           Vitals:  No vitals were obtained today due to " virtual visit.    Physical Exam   GENERAL: Healthy, alert and no distress  EYES: Eyes grossly normal to inspection.  No discharge or erythema, or obvious scleral/conjunctival abnormalities.  RESP: No audible wheeze, cough, or visible cyanosis.  No visible retractions or increased work of breathing.    SKIN: Visible skin clear. No significant rash, abnormal pigmentation or lesions.  NEURO: Cranial nerves grossly intact.  Mentation and speech appropriate for age.  PSYCH: Mentation appears normal, affect normal/bright, judgement and insight intact, normal speech and appearance well-groomed.                Video-Visit Details    Type of service:  Video Visit     Originating Location (pt. Location): Home    Distant Location (provider location):  Off-site  Platform used for Video Visit: [employer]

## 2023-03-17 NOTE — RESULT ENCOUNTER NOTE
Karin    Your lab tests are complete and I have reviewed the results.     Strep is negative.   No need to  the penicillin.   I hope you feel better soon.     If you have any questions or concerns, please feel free to call or send a [employer] message.    Sincerely,  Lai Duke PA-C

## 2023-03-20 ENCOUNTER — VIRTUAL VISIT (OUTPATIENT)
Dept: FAMILY MEDICINE | Facility: CLINIC | Age: 53
End: 2023-03-20
Payer: COMMERCIAL

## 2023-03-20 ENCOUNTER — NURSE TRIAGE (OUTPATIENT)
Dept: FAMILY MEDICINE | Facility: CLINIC | Age: 53
End: 2023-03-20

## 2023-03-20 ENCOUNTER — MYC MEDICAL ADVICE (OUTPATIENT)
Dept: FAMILY MEDICINE | Facility: CLINIC | Age: 53
End: 2023-03-20

## 2023-03-20 ENCOUNTER — MYC REFILL (OUTPATIENT)
Dept: FAMILY MEDICINE | Facility: CLINIC | Age: 53
End: 2023-03-20

## 2023-03-20 DIAGNOSIS — I10 BENIGN ESSENTIAL HYPERTENSION: ICD-10-CM

## 2023-03-20 DIAGNOSIS — U07.1 INFECTION DUE TO 2019 NOVEL CORONAVIRUS: Primary | ICD-10-CM

## 2023-03-20 DIAGNOSIS — R05.1 ACUTE COUGH: ICD-10-CM

## 2023-03-20 PROCEDURE — 99213 OFFICE O/P EST LOW 20 MIN: CPT | Mod: CS | Performed by: INTERNAL MEDICINE

## 2023-03-20 RX ORDER — PREDNISONE 10 MG/1
TABLET ORAL
Qty: 20 TABLET | Refills: 1 | Status: SHIPPED | OUTPATIENT
Start: 2023-03-20 | End: 2023-05-05

## 2023-03-20 RX ORDER — GUAIFENESIN/DEXTROMETHORPHAN 100-10MG/5
10 SYRUP ORAL EVERY 4 HOURS PRN
Qty: 354 ML | Refills: 3 | Status: SHIPPED | OUTPATIENT
Start: 2023-03-20 | End: 2023-05-05

## 2023-03-20 NOTE — TELEPHONE ENCOUNTER
S/w pt regarding  message below    I was tested for strep on Thursday 3/16 and was negative.  Turns out it was COVID.  I tested myself on Sunday.  It's been a brutal 4 days of symptoms.  Is there anything other than OTC meds I can take?  Still bad body aches, raw burning throat,coughing, fatigue.  Thank you for your reply .      Pt does not meet protocol for paxlovid standing order. Pt states symptoms started 6 days. Home care advise given to pt. VV scheduled for today to discuss tx options. COVID info routed to pt via .      RN COVID TREATMENT VISIT  03/20/23      The patient has been triaged and does not require a higher level of care.    Karin DARBY Neeta  53 year old  Current weight? 192    Has the patient been seen by a primary care provider at an [facility] or [facility] within the past two years? Yes.   Have you been in close proximity to/do you have a known exposure to a person with a confirmed case of influenza? No.     General treatment eligibility:  Date of positive COVID test (PCR or at home)?  3/19    Are you or have you been hospitalized for this COVID-19 infection? No.   Have you received monoclonal antibodies or antiviral treatment for COVID-19 since this positive test? No.   Do you have any of the following conditions that place you at risk of being very sick from COVID-19?   - Age 50 years or older  - Heart conditions such as cardiomyopathies, congenital heart defects, coronary artery disease, heart arrhythmias, heart failure, hypertension, valve disorders   - Overweight or Obesity (BMI >85th percentile or BMI 25 or higher)  Yes, patient has at least one high risk condition as noted above.     Current COVID symptoms:   - fever or chills  - cough  - fatigue  - muscle or body aches  - headache  - sore throat  Yes. Patient has at least one symptom as selected.     How many days since symptoms started? 6-7 days.   Is patient at least 16 years old? Yes  Does patient meet  one of the following?   - RN is unsure if the patient qualifies based on the criteria provided. (See Paxlovid Standing Order Policy for full list)    Yes, patient meets criteria or RN is unable to determine based on the information provided. Patient informed they will need to see a provider to discuss treatment options and will be scheduled with a provider or transferred to a  at the end of this call.    Lilo Reina RN              Reason for Disposition    [1] COVID-19 diagnosed by positive lab test (e.g., PCR, rapid self-test kit) AND [2] mild symptoms (e.g., cough, fever, others) AND [3] no complications or SOB    Additional Information    Negative: SEVERE difficulty breathing (e.g., struggling for each breath, speaks in single words)    Negative: Difficult to awaken or acting confused (e.g., disoriented, slurred speech)    Negative: Bluish (or gray) lips or face now    Negative: Shock suspected (e.g., cold/pale/clammy skin, too weak to stand, low BP, rapid pulse)    Negative: Sounds like a life-threatening emergency to the triager    Negative: [1] Diagnosed or suspected COVID-19 AND [2] symptoms lasting 3 or more weeks    Negative: [1] COVID-19 exposure AND [2] no symptoms    Negative: COVID-19 vaccine reaction suspected (e.g., fever, headache, muscle aches) occurring 1 to 3 days after getting vaccine    Negative: COVID-19 vaccine, questions about    Negative: [1] Lives with someone known to have influenza (flu test positive) AND [2] flu-like symptoms (e.g., cough, runny nose, sore throat, SOB; with or without fever)    Negative: [1] Adult with possible COVID-19 symptoms AND [2] triager concerned about severity of symptoms or other causes    Negative: COVID-19 and breastfeeding, questions about    Negative: SEVERE or constant chest pain or pressure  (Exception: Mild central chest pain, present only when coughing.)    Negative: MODERATE difficulty breathing (e.g., speaks in phrases, SOB even at rest,  "pulse 100-120)    Negative: Headache and stiff neck (can't touch chin to chest)    Negative: Oxygen level (e.g., pulse oximetry) 90 percent or lower    Negative: MILD difficulty breathing (e.g., minimal/no SOB at rest, SOB with walking, pulse <100)    Negative: Fever > 103 F (39.4 C)    Negative: [1] Fever > 101 F (38.3 C) AND [2] over 60 years of age    Negative: [1] Fever > 100.0 F (37.8 C) AND [2] bedridden (e.g., nursing home patient, CVA, chronic illness, recovering from surgery)    Negative: [1] HIGH RISK for severe COVID complications (e.g., weak immune system, age > 64 years, obesity with BMI of 30 or higher, pregnant, chronic lung disease or other chronic medical condition) AND [2] COVID symptoms (e.g., cough, fever)  (Exceptions: Already seen by PCP and no new or worsening symptoms.)    Negative: [1] HIGH RISK patient AND [2] influenza is widespread in the community AND [3] ONE OR MORE respiratory symptoms: cough, sore throat, runny or stuffy nose    Negative: [1] HIGH RISK patient AND [2] influenza exposure within the last 7 days AND [3] ONE OR MORE respiratory symptoms: cough, sore throat, runny or stuffy nose    Negative: Oxygen level (e.g., pulse oximetry) 91 to 94 percent    Negative: [1] COVID-19 infection suspected by caller or triager AND [2] mild symptoms (cough, fever, or others) AND [3] negative COVID-19 rapid test    Negative: Fever present > 3 days (72 hours)    Negative: [1] Fever returns after gone for over 24 hours AND [2] symptoms worse or not improved    Negative: [1] Continuous (nonstop) coughing interferes with work or school AND [2] no improvement using cough treatment per Care Advice    Negative: Cough present > 3 weeks    Negative: [1] COVID-19 diagnosed by positive lab test (e.g., PCR, rapid self-test kit) AND [2] NO symptoms (e.g., cough, fever, others)    Answer Assessment - Initial Assessment Questions  1. COVID-19 DIAGNOSIS: \"Who made your COVID-19 diagnosis?\" \"Was it confirmed " "by a positive lab test or self-test?\" If not diagnosed by a doctor (or NP/PA), ask \"Are there lots of cases (community spread) where you live?\" Note: See public health department website, if unsure.      Home test yesterday    2. COVID-19 EXPOSURE: \"Was there any known exposure to COVID before the symptoms began?\" CDC Definition of close contact: within 6 feet (2 meters) for a total of 15 minutes or more over a 24-hour period.       Yes, baltazar tested positive last night    3. ONSET: \"When did the COVID-19 symptoms start?\"       3/15    4. WORST SYMPTOM: \"What is your worst symptom?\" (e.g., cough, fever, shortness of breath, muscle aches)      Sore throat, cough    5. COUGH: \"Do you have a cough?\" If Yes, ask: \"How bad is the cough?\"        Moderate    6. FEVER: \"Do you have a fever?\" If Yes, ask: \"What is your temperature, how was it measured, and when did it start?\"      Yes, 103 at highest. Yesterday was first day without fever    7. RESPIRATORY STATUS: \"Describe your breathing?\" (e.g., shortness of breath, wheezing, unable to speak)       No     8. BETTER-SAME-WORSE: \"Are you getting better, staying the same or getting worse compared to yesterday?\"  If getting worse, ask, \"In what way?\"      Same    9. HIGH RISK DISEASE: \"Do you have any chronic medical problems?\" (e.g., asthma, heart or lung disease, weak immune system, obesity, etc.)      CAD    10. VACCINE: \"Have you had the COVID-19 vaccine?\" If Yes, ask: \"Which one, how many shots, when did you get it?\"        Yes    11. BOOSTER: \"Have you received your COVID-19 booster?\" If Yes, ask: \"Which one and when did you get it?\"        Yes    12. PREGNANCY: \"Is there any chance you are pregnant?\" \"When was your last menstrual period?\"        No     13. OTHER SYMPTOMS: \"Do you have any other symptoms?\"  (e.g., chills, fatigue, headache, loss of smell or taste, muscle pain, sore throat)        HA muscle pain fatigue  Coughing, ST    14. O2 SATURATION MONITOR:  \"Do " "you use an oxygen saturation monitor (pulse oximeter) at home?\" If Yes, ask \"What is your reading (oxygen level) today?\" \"What is your usual oxygen saturation reading?\" (e.g., 95%)        No    Protocols used: CORONAVIRUS (COVID-19) DIAGNOSED OR [id number]    HOME CARE:   * You should be able to treat this at home.      HUMIDIFIER:  * If the air is dry, use a humidifier in the bedroom.  * Dry air makes coughs worse.      COUGH SYRUP WITH DEXTROMETHORPHAN - EXTRA NOTES AND WARNINGS:  * Do not try to completely stop coughs that produce mucus and phlegm.  * Coughing is helpful. It brings up the mucus from the lungs and helps prevent pneumonia.  * RESEARCH: Some research studies show that dextromethorphan reduces the frequency and severity of cough in those 18 years and older without significant adverse effects. Other studies suggest that dextromethorphan is no better than placebo at reducing a cough.  * DRUG ABUSE: It should be noted that dextromethorphan has become a drug of abuse. This problem is seen most often in teenagers. Overdose symptoms can range from giggling and feeling high to hallucinations and coma.  * WARNING: Do not take dextromethorphan if you are taking a monoamine oxidase (MAO) inhibitor now or in the past 2 weeks. Examples of MAO inhibitors include isocarboxazid (Marplan), phenelzine (Nardil), selegiline (Eldepryl, Emsam, Zelapar), and tranylcypromine (Parnate).  * WARNING: Do not take dextromethorphan if you are taking venlafaxine (Effexor).  * Before taking any medicine, read all the instructions on the package.      PAIN AND FEVER MEDICINES - EXTRA NOTES AND WARNINGS:  * Use the lowest amount of medicine that makes your pain or fever better.  * Acetaminophen is thought to be safer than ibuprofen or naproxen in people over 65 years old. Acetaminophen is in many OTC and prescription medicines. It might be in more than one medicine that you are taking. You need to be careful and not take an " overdose. An acetaminophen overdose can hurt the liver.  * [employer], the company that makes Tylenol, has different dosage instructions for Tylenol in [place] and the [place]. In [place], the maximum recommended dose per day is 4,000 mg or twelve Regular-Strength (325 mg) pills. In the [place], [employer] recommends a maximum dose of ten Regular-Strength (325 mg) pills.  * CAUTION: Do not take acetaminophen if you have liver disease.  * CAUTION: Do not take ibuprofen if you have stomach problems, kidney disease, are pregnant, or have been told by your doctor to avoid this type of anti-inflammatory drug. Do not take ibuprofen for more than 7 days without consulting your doctor.  * Before taking any medicine, read all the instructions on the package.      CALL BACK IF:   * Fever over 103 F (39.4 C)  * Fever lasts over 3 days  * Fever returns after being gone for 24 hours  * Chest pain or difficulty breathing occurs  * You become worse        Patient/Caregiver understands and will follow care advice? Yes, plans to follow advice          CHATO [facility] Triage Team

## 2023-03-20 NOTE — PROGRESS NOTES
Karin is a 53 year old who is being evaluated via a billable telephone visit.      How would you like to obtain your AVS? MyChart  Distant Location (provider location):  Off-site      Subjective   Karin is a 53 year old presenting for the following health issues:  Covid Concern      HPI       COVID-19 Symptom Review  How many days ago did these symptoms start? Thursday    Are any of the following symptoms significant for you?    New or worsening difficulty breathing? No    Worsening cough? Yes, it's a dry cough.     Fever or chills? Yes, I felt feverish or had chills. 100-103    Headache: YES    Sore throat: YES    Diarrhea: YES    Body aches? YES    What treatments has patient tried? Acetaminophen and Decongestant - oral   Does patient live in a nursing home, group home, or shelter? No  Does patient have a way to get food/medications during quarantined? Yes, I have a friend or family member who can help me.        Current Medications:     Current Outpatient Medications   Medication Sig Dispense Refill     coenzyme Q-10 capsule Take 1 capsule by mouth daily       escitalopram (LEXAPRO) 20 MG tablet Take 1 tablet (20 mg) by mouth daily 90 tablet 3     ferrous sulfate 140 (45 Fe) MG TBCR CR tablet Take 140 mg by mouth daily       fish oil-omega-3 fatty acids 1000 MG capsule Take 2 g by mouth daily       lisinopril-hydrochlorothiazide (ZESTORETIC) 10-12.5 MG tablet Take 1 tablet by mouth daily 90 tablet 0     multivitamin w/minerals (THERA-VIT-M) tablet Take 1 tablet by mouth daily       Vitamin D3 (CHOLECALCIFEROL) 25 mcg (1000 units) tablet Take 1 tablet (1,000 Units) by mouth daily Take one tablet daily. 90 tablet 3     penicillin V (VEETID) 500 MG tablet Take 1 tablet (500 mg) by mouth 2 times daily for 10 days 20 tablet 0         Allergies:      Allergies   Allergen Reactions     No Known Allergies             Past Medical History:     Past Medical History:   Diagnosis Date     Benign essential hypertension  2023         Past Surgical History:     Past Surgical History:   Procedure Laterality Date      SECTION        SECTION       COLONOSCOPY N/A 2020    Procedure: COLONOSCOPY;  Surgeon: Hyun Perdomo MD;  Location:  GI     COLONOSCOPY N/A 2020    Procedure: Colonoscopy, With Polypectomy And Biopsy;  Surgeon: Hyun Perdomo MD;  Location: [facility]         Family Medical History:     Family History   Problem Relation Age of Onset     Breast Cancer Mother 80     Melanoma Mother      Angina Father      Hypertension Father      Ovarian Cancer No family hx of          Social History:     Social History     Socioeconomic History     Marital status:      Spouse name: Not on file     Number of children: Not on file     Years of education: Not on file     Highest education level: Not on file   Occupational History     Not on file   Tobacco Use     Smoking status: Former     Smokeless tobacco: Never     Tobacco comments:     Smoked for 10 years, quit 25 years ago   Vaping Use     Vaping Use: Never used   Substance and Sexual Activity     Alcohol use: Yes     Comment: occ     Drug use: Never     Sexual activity: Yes     Partners: Male     Birth control/protection: None   Other Topics Concern     Not on file   Social History Narrative     Not on file     Social Determinants of Health     Financial Resource Strain: Not on file   Food Insecurity: Not on file   Transportation Needs: Not on file   Physical Activity: Not on file   Stress: Not on file   Social Connections: Not on file   Intimate Partner Violence: Not on file   Housing Stability: Not on file           Review of System:     Constitutional: positive for fever or chills  Ears/Nose/Throat: positive for nasal congestion, sore throat  Respiratory: positive for cough, COVID Infection  Behavioral: Negative for tobacco use       Physical Exam:   LMP 2022     RESP: cough present  NEURO: Alert & Oriented x 3.   PSYCH: mentation  appears normal, affect normal        Diagnostic Test Results:     Diagnostic Test Results:  Labs reviewed in Epic    ASSESSMENT/PLAN:       Karin was seen today for covid concern.    Diagnoses and all orders for this visit:    Infection due to 2019 novel coronavirus  -     REVIEW OF HEALTH MAINTENANCE PROTOCOL ORDERS  -     predniSONE (DELTASONE) 10 MG tablet; Take 3 tabs by mouth daily x 3 days, then 2 tabs daily x 3 days, then 1 tab daily x 3 days, then 1/2 tab daily x 3 days.  -     guaiFENesin-dextromethorphan (ROBITUSSIN DM) 100-10 MG/5ML syrup; Take 10 mLs by mouth every 4 hours as needed for cough or congestion    Acute cough  -     REVIEW OF HEALTH MAINTENANCE PROTOCOL ORDERS  -     predniSONE (DELTASONE) 10 MG tablet; Take 3 tabs by mouth daily x 3 days, then 2 tabs daily x 3 days, then 1 tab daily x 3 days, then 1/2 tab daily x 3 days.  -     guaiFENesin-dextromethorphan (ROBITUSSIN DM) 100-10 MG/5ML syrup; Take 10 mLs by mouth every 4 hours as needed for cough or congestion            Follow Up Plan:     Patient is instructed to return to Internal Medicine clinic for follow-up visit in 1 week.    Telephone visit duration including chart review, medication management: 20 minutes    Tonya Oliva MD  Internal Medicine  [facility]

## 2023-03-21 RX ORDER — LISINOPRIL/HYDROCHLOROTHIAZIDE 10-12.5 MG
1 TABLET ORAL DAILY
Qty: 90 TABLET | Refills: 0 | Status: SHIPPED | OUTPATIENT
Start: 2023-03-21 | End: 2023-05-05

## 2023-04-07 ENCOUNTER — MYC MEDICAL ADVICE (OUTPATIENT)
Dept: FAMILY MEDICINE | Facility: CLINIC | Age: 53
End: 2023-04-07
Payer: COMMERCIAL

## 2023-04-16 ENCOUNTER — HEALTH MAINTENANCE LETTER (OUTPATIENT)
Age: 53
End: 2023-04-16

## 2023-04-17 ENCOUNTER — MYC MEDICAL ADVICE (OUTPATIENT)
Dept: FAMILY MEDICINE | Facility: CLINIC | Age: 53
End: 2023-04-17
Payer: COMMERCIAL

## 2023-04-26 PROBLEM — G89.29 CHRONIC LEFT-SIDED LOW BACK PAIN WITH LEFT-SIDED SCIATICA: Status: RESOLVED | Noted: 2022-11-23 | Resolved: 2023-04-26

## 2023-04-26 PROBLEM — M54.16 LEFT LUMBAR RADICULITIS: Status: RESOLVED | Noted: 2022-11-23 | Resolved: 2023-04-26

## 2023-04-26 PROBLEM — M54.42 CHRONIC LEFT-SIDED LOW BACK PAIN WITH LEFT-SIDED SCIATICA: Status: RESOLVED | Noted: 2022-11-23 | Resolved: 2023-04-26

## 2023-04-27 ENCOUNTER — LAB (OUTPATIENT)
Dept: LAB | Facility: CLINIC | Age: 53
End: 2023-04-27
Payer: COMMERCIAL

## 2023-04-27 ENCOUNTER — MYC MEDICAL ADVICE (OUTPATIENT)
Dept: FAMILY MEDICINE | Facility: CLINIC | Age: 53
End: 2023-04-27

## 2023-04-27 DIAGNOSIS — Z13.220 SCREENING FOR CHOLESTEROL LEVEL: Primary | ICD-10-CM

## 2023-04-27 DIAGNOSIS — I10 BENIGN ESSENTIAL HYPERTENSION: ICD-10-CM

## 2023-04-27 DIAGNOSIS — Z13.220 SCREENING FOR CHOLESTEROL LEVEL: ICD-10-CM

## 2023-04-27 LAB
ANION GAP SERPL CALCULATED.3IONS-SCNC: 10 MMOL/L (ref 7–15)
BUN SERPL-MCNC: 11.7 MG/DL (ref 6–20)
CALCIUM SERPL-MCNC: 9.7 MG/DL (ref 8.6–10)
CHLORIDE SERPL-SCNC: 103 MMOL/L (ref 98–107)
CHOLEST SERPL-MCNC: 213 MG/DL
CREAT SERPL-MCNC: 0.77 MG/DL (ref 0.51–0.95)
DEPRECATED HCO3 PLAS-SCNC: 27 MMOL/L (ref 22–29)
GFR SERPL CREATININE-BSD FRML MDRD: >90 ML/MIN/1.73M2
GLUCOSE SERPL-MCNC: 92 MG/DL (ref 70–99)
HDLC SERPL-MCNC: 39 MG/DL
LDLC SERPL CALC-MCNC: 133 MG/DL
NONHDLC SERPL-MCNC: 174 MG/DL
POTASSIUM SERPL-SCNC: 4.4 MMOL/L (ref 3.4–5.3)
SODIUM SERPL-SCNC: 140 MMOL/L (ref 136–145)
TRIGL SERPL-MCNC: 203 MG/DL

## 2023-04-27 PROCEDURE — 80048 BASIC METABOLIC PNL TOTAL CA: CPT

## 2023-04-27 PROCEDURE — 36415 COLL VENOUS BLD VENIPUNCTURE: CPT

## 2023-04-27 PROCEDURE — 80061 LIPID PANEL: CPT

## 2023-04-28 ENCOUNTER — ANCILLARY PROCEDURE (OUTPATIENT)
Dept: MAMMOGRAPHY | Facility: CLINIC | Age: 53
End: 2023-04-28
Attending: FAMILY MEDICINE
Payer: COMMERCIAL

## 2023-04-28 DIAGNOSIS — Z12.31 VISIT FOR SCREENING MAMMOGRAM: ICD-10-CM

## 2023-04-28 PROCEDURE — 77067 SCR MAMMO BI INCL CAD: CPT

## 2023-05-01 ENCOUNTER — ANCILLARY ORDERS (OUTPATIENT)
Dept: FAMILY MEDICINE | Facility: CLINIC | Age: 53
End: 2023-05-01

## 2023-05-01 DIAGNOSIS — N64.89 BREAST ASYMMETRY: ICD-10-CM

## 2023-05-05 ENCOUNTER — OFFICE VISIT (OUTPATIENT)
Dept: FAMILY MEDICINE | Facility: CLINIC | Age: 53
End: 2023-05-05
Attending: FAMILY MEDICINE
Payer: COMMERCIAL

## 2023-05-05 VITALS
DIASTOLIC BLOOD PRESSURE: 75 MMHG | HEIGHT: 65 IN | BODY MASS INDEX: 31.99 KG/M2 | WEIGHT: 192 LBS | SYSTOLIC BLOOD PRESSURE: 114 MMHG | HEART RATE: 67 BPM

## 2023-05-05 DIAGNOSIS — I10 BENIGN ESSENTIAL HYPERTENSION: Primary | ICD-10-CM

## 2023-05-05 DIAGNOSIS — E78.5 DYSLIPIDEMIA: ICD-10-CM

## 2023-05-05 DIAGNOSIS — M75.82 TENDINITIS OF LEFT ROTATOR CUFF: ICD-10-CM

## 2023-05-05 DIAGNOSIS — F33.42 MAJOR DEPRESSIVE DISORDER, RECURRENT EPISODE, IN FULL REMISSION (H): ICD-10-CM

## 2023-05-05 PROBLEM — Z82.49 FAMILY HISTORY OF RENAL ARTERY STENOSIS: Status: RESOLVED | Noted: 2020-03-03 | Resolved: 2023-05-05

## 2023-05-05 PROCEDURE — G0463 HOSPITAL OUTPT CLINIC VISIT: HCPCS | Performed by: FAMILY MEDICINE

## 2023-05-05 PROCEDURE — 99214 OFFICE O/P EST MOD 30 MIN: CPT | Performed by: FAMILY MEDICINE

## 2023-05-05 RX ORDER — LISINOPRIL/HYDROCHLOROTHIAZIDE 10-12.5 MG
1 TABLET ORAL DAILY
Qty: 90 TABLET | Refills: 0 | Status: SHIPPED | OUTPATIENT
Start: 2023-05-05 | End: 2023-09-12

## 2023-05-05 ASSESSMENT — PAIN SCALES - GENERAL: PAINLEVEL: SEVERE PAIN (6)

## 2023-05-05 NOTE — PROGRESS NOTES
"CC: Follow Up (Blood work and other questions )      SUBJECTIVE: Karin is a 53 year old female who comes in for follow-up.     New concerns:   Left shoulder pain. No known injury or trauma. Started after she started strength training. She is R hand dominant. Has full ROM of the shoulder but pain with certain movements such as abduction and external rotation. No prior history of shoulder problems.      Chronic conditions:   HTN. Long-standing diagnosis.   - Medications: Has been treating with lisinopril 10 mg daily and hydrochlorothiazide 12.5 mg daily. No adverse medication effects.   - Most recent surveillance labs reviewed today and are notable for stable creatinine, eGFR, and sodium/potassium (4/27/2023).   - Patient denies headaches, vision changes, chest pain, cough, SOB, palpitations, LE edema, PND, or orthopnea.  - Started [facility] diet 4-5 weeks ago. Has been more mindful diet and regular exercise. Has lost 5.5 lbs in the last month.      Hyperlipidemia. Long-standing diagnosis.   -  Has been treating with lifestyle.   - Most recent surveillance labs reviewed today and are notable for , , HDL 39,  (4/27/2023).   - The 10-year ASCVD risk score (Hannah DK, et al., 2019) is: 2.9%    Values used to calculate the score:      Age: 53 years      Sex: Female      Is Non- : No      Diabetic: No      Tobacco smoker: No      Systolic Blood Pressure: 114 mmHg      Is BP treated: Yes      HDL Cholesterol: 39 mg/dL      Total Cholesterol: 213 mg/dL    Depression/anxiety. Long-standing diagnosis.   - Medications: Has been treating with escitalopram 20 mg daily. No adverse medication effects. Not ready to taper yet.        OBJECTIVE:   /75   Pulse 67   Ht 1.638 m (5' 4.5\")   Wt 87.1 kg (192 lb)   LMP 01/05/2022   BMI 32.45 kg/m    General: Alert and oriented in no acute distress.  Cardio: RRR, normal S1 and S2, without murmurs, rubs, or gallops appreciated.    Resp: " CTA bilaterally. Normal respiratory effort.   MSK: Full ROM of bilateral shoulders, left arm pain with resisted external rotation and resisted abduction; 5/5 strength, left arm negative Marti, Neers. left arm positive Speed. No pain at biceps or supraspinatus tendon.   Psych: Mood and affect appropriate.      ASSESSMENT/PLAN:   Karin was seen today for follow up.    Benign essential hypertension  The current medical regimen is effective;  continue present plan and medications.  -     lisinopril-hydrochlorothiazide (ZESTORETIC) 10-12.5 MG tablet; Take 1 tablet by mouth daily Need labs for any future refills.    Tendinitis of left rotator cuff  Recommend PT for rotator cuff tendinitis. Karin agrees w/ plan, referral placed. RTC if pain worsening or not improving with PT.   -     Physical Therapy Referral; Future    Major depressive disorder, recurrent episode, in full remission (H)  The current medical regimen is effective;  continue present plan and medications.    Dyslipidemia  Continue healthy lifestyle habits for lipid management and monitor annually.     Worrisome signs and symptoms were discussed with Karin and she was instructed to return to the clinic for concerning symptoms or to call with questions.      Shahla Billings MD  Family Medicine

## 2023-05-11 ENCOUNTER — ANCILLARY PROCEDURE (OUTPATIENT)
Dept: MAMMOGRAPHY | Facility: CLINIC | Age: 53
End: 2023-05-11
Attending: FAMILY MEDICINE
Payer: COMMERCIAL

## 2023-05-11 DIAGNOSIS — N64.89 BREAST ASYMMETRY: ICD-10-CM

## 2023-05-11 PROCEDURE — 77061 BREAST TOMOSYNTHESIS UNI: CPT | Mod: RT

## 2023-05-30 ENCOUNTER — LAB (OUTPATIENT)
Dept: LAB | Facility: CLINIC | Age: 53
End: 2023-05-30
Payer: COMMERCIAL

## 2023-05-30 ENCOUNTER — MYC MEDICAL ADVICE (OUTPATIENT)
Dept: FAMILY MEDICINE | Facility: CLINIC | Age: 53
End: 2023-05-30
Payer: COMMERCIAL

## 2023-05-30 DIAGNOSIS — R39.9 UTI SYMPTOMS: Primary | ICD-10-CM

## 2023-05-30 DIAGNOSIS — R39.9 UTI SYMPTOMS: ICD-10-CM

## 2023-05-30 DIAGNOSIS — N30.01 ACUTE CYSTITIS WITH HEMATURIA: ICD-10-CM

## 2023-05-30 LAB
ALBUMIN UR-MCNC: 50 MG/DL
APPEARANCE UR: ABNORMAL
BACTERIA #/AREA URNS HPF: ABNORMAL /HPF
BILIRUB UR QL STRIP: NEGATIVE
COLOR UR AUTO: ABNORMAL
GLUCOSE UR STRIP-MCNC: NEGATIVE MG/DL
HGB UR QL STRIP: ABNORMAL
KETONES UR STRIP-MCNC: NEGATIVE MG/DL
LEUKOCYTE ESTERASE UR QL STRIP: ABNORMAL
MUCOUS THREADS #/AREA URNS LPF: PRESENT /LPF
NITRATE UR QL: NEGATIVE
PH UR STRIP: 8 [PH] (ref 5–7)
RBC URINE: >182 /HPF
RENAL TUB EPI: 1 /HPF
SP GR UR STRIP: 1.01 (ref 1–1.03)
SQUAMOUS EPITHELIAL: 2 /HPF
UROBILINOGEN UR STRIP-MCNC: NORMAL MG/DL
WBC URINE: 45 /HPF

## 2023-05-30 PROCEDURE — 87086 URINE CULTURE/COLONY COUNT: CPT

## 2023-05-30 PROCEDURE — 81001 URINALYSIS AUTO W/SCOPE: CPT

## 2023-05-30 RX ORDER — NITROFURANTOIN 25; 75 MG/1; MG/1
100 CAPSULE ORAL 2 TIMES DAILY
Qty: 10 CAPSULE | Refills: 0 | Status: SHIPPED | OUTPATIENT
Start: 2023-05-30 | End: 2023-06-04

## 2023-05-30 RX ORDER — PHENAZOPYRIDINE HYDROCHLORIDE 100 MG/1
100 TABLET, FILM COATED ORAL 3 TIMES DAILY PRN
Qty: 15 TABLET | Refills: 0 | Status: SHIPPED | OUTPATIENT
Start: 2023-05-30 | End: 2023-09-12

## 2023-06-01 LAB — BACTERIA UR CULT: NORMAL

## 2023-07-24 ENCOUNTER — MYC MEDICAL ADVICE (OUTPATIENT)
Dept: FAMILY MEDICINE | Facility: CLINIC | Age: 53
End: 2023-07-24
Payer: COMMERCIAL

## 2023-07-24 DIAGNOSIS — M75.82 TENDINITIS OF LEFT ROTATOR CUFF: Primary | ICD-10-CM

## 2023-07-28 ENCOUNTER — THERAPY VISIT (OUTPATIENT)
Dept: PHYSICAL THERAPY | Facility: CLINIC | Age: 53
End: 2023-07-28
Attending: FAMILY MEDICINE
Payer: COMMERCIAL

## 2023-07-28 DIAGNOSIS — M25.512 ACUTE PAIN OF LEFT SHOULDER: Primary | ICD-10-CM

## 2023-07-28 DIAGNOSIS — M75.82 TENDINITIS OF LEFT ROTATOR CUFF: ICD-10-CM

## 2023-07-28 PROCEDURE — 97161 PT EVAL LOW COMPLEX 20 MIN: CPT | Mod: GP | Performed by: PHYSICAL THERAPIST

## 2023-07-28 PROCEDURE — 97530 THERAPEUTIC ACTIVITIES: CPT | Mod: GP | Performed by: PHYSICAL THERAPIST

## 2023-07-28 PROCEDURE — 97110 THERAPEUTIC EXERCISES: CPT | Mod: 59 | Performed by: PHYSICAL THERAPIST

## 2023-07-28 NOTE — PROGRESS NOTES
PHYSICAL THERAPY EVALUATION  Type of Visit: Evaluation    See electronic medical record for Abuse and Falls Screening details.  Diagnosis: L shoulder pain   Precautions/Restrictions/MD instructions/Other pertinent hx E&T    Therapist Impression:   Karin presents with possible early onset adhesive capsulitis    GOALS: reaching    NEXT: consider taping    PTRX: handouts    Subjective:  Pain is getting worse, no trauma, reaching up and out to side    Subjective       Presenting condition or subjective complaint: Left upper arm pain  Date of onset: 23    Relevant medical history: Menopause   Dates & types of surgery:  99 and 3/29/01    Prior diagnostic imaging/testing results:       Prior therapy history for the same diagnosis, illness or injury: No      Prior Level of Function  Transfers: Independent  Ambulation: Independent  ADL: Independent  IADL:     Living Environment  Social support: With a significant other or spouse   Type of home: House   Stairs to enter the home: Yes 3 Is there a railing: No   Ramp: No   Stairs inside the home: Yes 1 Is there a railing: Yes   Help at home: None  Equipment owned:       Employment: Yes    Hobbies/Interests: Reading, gardening, improv    Patient goals for therapy: Reach out to side with arm and full ROM    Pain assessment: Pain present     Objective     STATIC POSTURE  Forward head: mild   Rounded shoulders:moderate  Shoulder internally rotated: mild   Visual inspection: NA    DYNAMIC SCAPULAR TESTS  Dynamic Scapular Assessment:     SHOULDER RANGE OF MOTION  AROM Flexion Abduction ER Base Ext/IR or hand behind back angle   Left 145 150  Ant Drift: no 25 pain , but can go further wnl   Right 145 150  Ant Drift: no 60 wnl   Pain: yes    JOINT MOBILITY      PROM Flexion Abd 90-90 ER 90-90 IR Scap Stabilized Horizontal Adduction   Left na na  30 pain wnl na   Right na na 100 wnl na   GH indicates pure glenohumeral ROM    SHOULDER STRENGTH  MMT Right Left    Flexion na/5 na/5   Abduction 5/5 5/5   ER 5/5 5/5   IR 5/5 5/5   P= pain    SPECIAL TESTS    PALPATION  Left: Not assessed  Right: Not assessed      Assessment & Plan   CLINICAL IMPRESSIONS  Medical Diagnosis: L shoulder pain    Treatment Diagnosis: L shoulder pain   Impression/Assessment: Patient is a 53 year old female with L shoulder complaints.  The following significant findings have been identified: Pain, Decreased ROM/flexibility, Impaired muscle performance, and Impaired posture. These impairments interfere with their ability to perform self care tasks, work tasks, recreational activities, and household chores as compared to previous level of function.     Clinical Decision Making (Complexity):  Clinical Presentation: Stable/Uncomplicated  Clinical Presentation Rationale: based on medical and personal factors listed in PT evaluation  Clinical Decision Making (Complexity): Low complexity    PLAN OF CARE  Treatment Interventions:  Interventions: Manual Therapy, Neuromuscular Re-education, Therapeutic Activity, Therapeutic Exercise, Self-Care/Home Management    Long Term Goals     PT Goal 1  Goal Identifier: Reaching  Goal Description: Reach out to side and OH without pain  Rationale: to maximize safety and independence with performance of ADLs and functional tasks;to maximize safety and independence within the home  Goal Progress: Pain present  Target Date: 09/22/23      Frequency of Treatment: 2 x month  Duration of Treatment: 3 months    Recommended Referrals to Other Professionals:   Education Assessment:        Risks and benefits of evaluation/treatment have been explained.   Patient/Family/caregiver agrees with Plan of Care.     Evaluation Time:     PT Eval, Low Complexity Minutes (39896): 15       Signing Clinician: Abram Canas PT

## 2023-08-03 ENCOUNTER — THERAPY VISIT (OUTPATIENT)
Dept: PHYSICAL THERAPY | Facility: CLINIC | Age: 53
End: 2023-08-03
Attending: FAMILY MEDICINE
Payer: COMMERCIAL

## 2023-08-03 DIAGNOSIS — M25.512 ACUTE PAIN OF LEFT SHOULDER: Primary | ICD-10-CM

## 2023-08-03 PROCEDURE — 97110 THERAPEUTIC EXERCISES: CPT | Mod: GP

## 2023-08-22 NOTE — PROGRESS NOTES
"Physical Therapy Virtual Initial Visit      The patient has been notified of following:     \"This virtual visit will be conducted between you and your provider. We have found that certain health care needs can be provided without the need for physical presence.  This service lets us provide the care you need with a virtual visit.\"    Due to external, as well as internal [facility] management of the COVID-19 Virus, Karni Santacruz was not seen in our clinic.  As a substitution, we implemented a virtual visit to manage this patient's condition utilizing the PTRx virtual visit platform via the patient s existing code.  The provider, Melissa Vivas, reviewed the patient's chart, PTRx prescription, and spoke with the patient to determine the following telemedicine visit is appropriate and effective for the patient's care.    The following type of visit was completed:   Telephone Visit:  A telephone visit was used in conjunction with the online PTRx exercise platform.         Subjective:    Patient Health History  Karin Santacruz being seen for pain in groin area - right upper inner thigh - that shoots down when I move it.     Problem began: 3/1/2020.   Problem occurred: no idea - don't remember an \"all of thje sudden\" event   Pain is reported as 6/10 on pain scale.  General health as reported by patient is good.  Pertinent medical history includes: overweight.     Medical allergies: none.   Surgeries include:  None.    Current medications:  Anti-depressants and other. Other medications details: Vitamin D 10K mil.    Current occupation is  & Manager.   Primary job tasks include:  Computer work, driving and prolonged sitting.                  Therapist Generated HPI Evaluation  Problem details: MD order 5/13/20. Karin has been having right groin pain for the past few months. SHe does not recollect any injury associated with it. She one day was trying to put her shoes on and when she lifted her " New referral received. Patient is currently scheduled with MD Salcido on 1/30. Please advise if earlier scheduling is required or if patient can be seen by APNP.   right leg up she noticed severe pan in her groing area. She has rested. Since then the pain has not gotten better. She does not have pain while walking or standing, she noticing with hip rotation, knee rotation activities and while sleeping on the sides. She was referred to PT for further management..         Type of problem:  Right hip.    This is a new condition.  Condition occurred with:  Insidious onset.  Where condition occurred: for unknown reasons.  Patient reports pain:  Groin and medial.  Pain is described as aching and sharp and is intermittent.  Pain radiates to:  No radiation. Pain is worse during the day.  Since onset symptoms are unchanged.  Associated with: pain. Symptoms are exacerbated by descending stairs, ascending stairs, lying on extremity and transfers (pivoting  activities)        Barriers include:  None as reported by patient.                  Objective:          Flexibility/Screens:       Lower Extremity:      Decreased right lower extremity flexibility:  Hip IR's and Adductors                                               Hip Evaluation  HIP AROM:  : Limited hip abduction and adduction, ER?IR ROM mentioned due to pain.                        Hip Palpation:  Palpations normal left hip: upon palaption by patient.    Right hip tenderness present at:  Adductors           General   ROS    PTRx Content from today's visit:  Exercise Name: Icing, Sets: 1 set - Reps: 10-15 min  - Sessions: 2-3 x day, Notes: elastogel- [email or web address]- 9 x 24 size  Exercise Name: Supine Heel Slides, Sets: 1 set - Reps: 20-30 reps - Sessions: 2 x day  Exercise Name: Supine Abdominal Exercise #3 (Marching), Sets: 1 set - Reps:  20-30 reps  - Sessions:  2 x day  Exercise Name: Supine AROM Hip Abduction, Sets: 1 set - Reps:  10-20 reps painfree  range - Sessions: 2 x day  Exercise Name: Standing Hip Flexion Straight Leg Raise, Sets: 1 set - Reps: 15 reps - Sessions: 2 x day, Notes: Perform both sides  Exercise Name: Hip AROM  Standing Extension, Sets: 1 set - Reps: 15 reps - Sessions: 2 x day, Notes: perform both sides  Exercise Name: Hip AROM Standing Abduction, Sets: 1 set - Reps: 15 reps  - Sessions:  2 x day, Notes: perform both sides    Assessment/Plan:     Patient is a 50 year old female with right side hip complaints.    Patient has the following significant findings with corresponding treatment plan.                Diagnosis 1:  Right groin pain- Adductor strain  Pain -  hot/cold therapy, manual therapy, STS, self management, education and home program  Decreased ROM/flexibility - manual therapy, therapeutic exercise, therapeutic activity and home program  Decreased strength - therapeutic exercise, therapeutic activities and home program  Inflammation - cold therapy, US and self management/home program  Decreased function - therapeutic activities and home program    Therapy Evaluation Codes:   1) History comprised of:   Personal factors that impact the plan of care:      Time since onset of symptoms.    Comorbidity factors that impact the plan of care are:      check chart.     Medications impacting care: check chart.  2) Examination of Body Systems comprised of:   Body structures and functions that impact the plan of care:      Hip.   Activity limitations that impact the plan of care are:      Stairs, Sleeping and trasnfers.  3) Clinical presentation characteristics are:   Stable/Uncomplicated.  4) Decision-Making    Low complexity using standardized patient assessment instrument and/or measureable assessment of functional outcome.  Cumulative Therapy Evaluation is: Low complexity.    Previous and current functional limitations:  (See Goal Flow Sheet for this information)    Short term and Long term goals: (See Goal Flow Sheet for this information)     Communication ability:  Patient appears to be able to clearly communicate and understand verbal and written communication and follow directions correctly.  Treatment Explanation -  The following has been discussed with the patient:   RX ordered/plan of care  Anticipated outcomes  Possible risks and side effects  This patient would benefit from PT intervention to resume normal activities.   Rehab potential is good.    Frequency:  1 X every other week, once daily  Duration:  for 6 weeks  Discharge Plan:  Achieve all LTG.  Independent in home treatment program.  Reach maximal therapeutic benefit.    Please refer to the daily flowsheet for treatment today, total treatment time and time spent performing 1:1 timed codes.       Virtual visit contact time    Time of service began: 1.50 PM  Time of service ended: 2.30 PM  Total Time for set up, visit, and documentation: 40 minutes    Payor: MEDICA / Plan: MEDICA CHOICE / Product Type: Indemnity /     Procedure Code/s   Therapeutic Exercise (99963): 25 minutes  Evaluation: 15 minutes    I have reviewed the note as documented above.  This accurately captures the substance of my conversation with the patient.  Provider location: [place] ([facility])  Patient location: home    ___________________________________________________

## 2023-09-12 ENCOUNTER — OFFICE VISIT (OUTPATIENT)
Dept: FAMILY MEDICINE | Facility: CLINIC | Age: 53
End: 2023-09-12
Attending: FAMILY MEDICINE
Payer: COMMERCIAL

## 2023-09-12 VITALS — WEIGHT: 187 LBS | SYSTOLIC BLOOD PRESSURE: 121 MMHG | DIASTOLIC BLOOD PRESSURE: 80 MMHG | BODY MASS INDEX: 31.6 KG/M2

## 2023-09-12 DIAGNOSIS — I10 BENIGN ESSENTIAL HYPERTENSION: ICD-10-CM

## 2023-09-12 DIAGNOSIS — Z23 NEED FOR VACCINATION: ICD-10-CM

## 2023-09-12 DIAGNOSIS — F33.42 MAJOR DEPRESSIVE DISORDER, RECURRENT EPISODE, IN FULL REMISSION (H): ICD-10-CM

## 2023-09-12 DIAGNOSIS — G89.29 CHRONIC LEFT SHOULDER PAIN: Primary | ICD-10-CM

## 2023-09-12 DIAGNOSIS — M25.512 CHRONIC LEFT SHOULDER PAIN: Primary | ICD-10-CM

## 2023-09-12 PROCEDURE — G0008 ADMIN INFLUENZA VIRUS VAC: HCPCS

## 2023-09-12 PROCEDURE — 90715 TDAP VACCINE 7 YRS/> IM: CPT

## 2023-09-12 PROCEDURE — G0463 HOSPITAL OUTPT CLINIC VISIT: HCPCS | Mod: 25 | Performed by: FAMILY MEDICINE

## 2023-09-12 PROCEDURE — 250N000011 HC RX IP 250 OP 636

## 2023-09-12 PROCEDURE — 90686 IIV4 VACC NO PRSV 0.5 ML IM: CPT

## 2023-09-12 PROCEDURE — 90472 IMMUNIZATION ADMIN EACH ADD: CPT

## 2023-09-12 PROCEDURE — 90471 IMMUNIZATION ADMIN: CPT

## 2023-09-12 PROCEDURE — 99214 OFFICE O/P EST MOD 30 MIN: CPT | Performed by: FAMILY MEDICINE

## 2023-09-12 RX ORDER — ESCITALOPRAM OXALATE 10 MG/1
TABLET ORAL
Qty: 30 TABLET | Refills: 1 | Status: SHIPPED | OUTPATIENT
Start: 2023-09-12 | End: 2023-10-06

## 2023-09-12 RX ORDER — LISINOPRIL/HYDROCHLOROTHIAZIDE 10-12.5 MG
1 TABLET ORAL DAILY
Qty: 90 TABLET | Refills: 0 | Status: SHIPPED | OUTPATIENT
Start: 2023-09-12 | End: 2023-12-12

## 2023-09-12 NOTE — PATIENT INSTRUCTIONS
Some symptoms you may experience while tapering are:   Dizziness  Fatigue  Headache  Nausea   Anxiety  Chills  Electric shock-like sensations ( brain zaps )  Insomnia  Irritability

## 2023-09-12 NOTE — PROGRESS NOTES
CC: Consult (Vaccines and med check/)      SUBJECTIVE: Karin is a 53 year old female who comes in with the following concerns:    Left shoulder pain. No known injury or trauma. Started after she started strength training. She is R hand dominant. Has full ROM of the shoulder but pain with certain movements such as abduction and external rotation. No prior history of shoulder problems.  Seen for this on 5/5/2023, referred to PT. Has had 2 visits thus far. Diagnosed with frozen shoulder and prescribed exercises. Stopped doing them consistently because the pain progressed. She occ takes Advil. Sh is sleeping on her right side and puts her arm on a pillow. Decrease in ROM. Leaving for a trip for [place] with her  soon, and hoping for a cortisone shot or something else for relief.      Depression. Currently taking escitalopram 20 mg daily. Has been on it for >4 years. Is feeling pretty good. Wants to taper.      HTN.    - Medications: Has been treating with lisinopril 10 - hydrochlorothiazide 12.5 mg daily. No adverse medication effects.   - Most recent surveillance labs reviewed today and are notable for stable creatinine, eGFR, and sodium/potassium (4/2023).        OBJECTIVE:   /80   Wt 84.8 kg (187 lb)   LMP 01/05/2022   BMI 31.60 kg/m    General: Alert and oriented in no acute distress.  MSK: Full ROM of right shoulder, left shoulder with limited internal & external rotation, left arm pain with resisted external rotation and resisted abduction; strength testing limited d/t pain; pain with Speed and Hawkin maneuvers. Negative drop arm.   Psych: Mood and affect appropriate.      ASSESSMENT/PLAN:   Karin was seen today for consult.  Chronic left shoulder pain  Since 5/2023. Has seen PT x2. Pain has worsened since 5/2023 and range of motion has decreased. Possible frozen shoulder, rotator cuff tendinitis. Orthopedic referral placed to consider CSI and/or imaging. Continue PT exercises.   -     Orthopedic  Mikael Referral; Future    Major depressive disorder, recurrent episode, in full remission (H)  Discussed selective serotonin reuptake inhibitor taper and side effects. Plans to wait until after her trip to [place], then reduce escitalopram from 20 mg daily to 10 mg daily, then to 5 mg daily and then follow-up with me.  -     escitalopram (LEXAPRO) 10 MG tablet; Take 1 tablet (10 mg) by mouth daily for 30 days, THEN 0.5 tablets (5 mg) daily for 30 days.    Benign essential hypertension  The current medical regimen is effective;  continue present plan and medications.  -     lisinopril-hydrochlorothiazide (ZESTORETIC) 10-12.5 MG tablet; Take 1 tablet by mouth daily.    Need for vaccination  Vaccines updated today.   -     TDAP VACCINE (Adacel, Boostrix)  [[id number]]  -     INFLUENZA VACCINE >6 MONTHS (AFLURIA/FLUZONE)      Worrisome signs and symptoms were discussed with Karin and she was instructed to return to the clinic for concerning symptoms or to call with questions. Return in about 2 months (around 11/12/2023) for med check - Lexapro taper.    Shahla Billings MD  Family Medicine

## 2023-09-13 ENCOUNTER — OFFICE VISIT (OUTPATIENT)
Dept: ORTHOPEDICS | Facility: CLINIC | Age: 53
End: 2023-09-13
Payer: COMMERCIAL

## 2023-09-13 ENCOUNTER — ANCILLARY PROCEDURE (OUTPATIENT)
Dept: GENERAL RADIOLOGY | Facility: CLINIC | Age: 53
End: 2023-09-13
Attending: STUDENT IN AN ORGANIZED HEALTH CARE EDUCATION/TRAINING PROGRAM
Payer: COMMERCIAL

## 2023-09-13 VITALS
HEIGHT: 65 IN | BODY MASS INDEX: 31.16 KG/M2 | SYSTOLIC BLOOD PRESSURE: 135 MMHG | WEIGHT: 187 LBS | DIASTOLIC BLOOD PRESSURE: 79 MMHG

## 2023-09-13 DIAGNOSIS — M25.512 PAIN IN JOINT OF LEFT SHOULDER: ICD-10-CM

## 2023-09-13 DIAGNOSIS — M75.52 SUBACROMIAL BURSITIS OF LEFT SHOULDER JOINT: ICD-10-CM

## 2023-09-13 DIAGNOSIS — M75.22 BICEPS TENDINITIS OF LEFT SHOULDER: ICD-10-CM

## 2023-09-13 DIAGNOSIS — M25.512 PAIN IN JOINT OF LEFT SHOULDER: Primary | ICD-10-CM

## 2023-09-13 PROCEDURE — 99203 OFFICE O/P NEW LOW 30 MIN: CPT | Performed by: STUDENT IN AN ORGANIZED HEALTH CARE EDUCATION/TRAINING PROGRAM

## 2023-09-13 PROCEDURE — 73030 X-RAY EXAM OF SHOULDER: CPT | Mod: TC | Performed by: RADIOLOGY

## 2023-09-13 RX ORDER — DICLOFENAC SODIUM 75 MG/1
75 TABLET, DELAYED RELEASE ORAL 2 TIMES DAILY
Qty: 60 TABLET | Refills: 0 | Status: SHIPPED | OUTPATIENT
Start: 2023-09-13 | End: 2023-12-05

## 2023-09-13 NOTE — LETTER
9/13/2023         RE: Karin Santacruz  [address] [place] [address]        Dear Colleague,    Thank you for referring your patient, Karin Santacruz, to the [facility] [place]. Please see a copy of my visit note below.    CC: Left Shoulder pain    HPI: Patient is a 53-year-old right-hand-dominant female presents today with left shoulder pain.  She states this started approximate 3 months ago.  She does not recall any inciting injury.  Is been getting progressively worse.  She localizes the pain to the anterior and lateral aspect of her shoulder.  She states the pain shoots down the anterior aspect of her upper arm as well and because of some spasms there.  Prior to this she had no issues with the left shoulder.  She has never had any like this before in the right shoulder.  She takes p.o. Advil as needed for the pain.  Over the last week or so the pain has been worsening and sometimes waking her up at night.  She has seen physical therapy at [facility] for 2 visits.  She was doing some range of motion and rotator cuff strengthening.  However she states her shoulder to be getting more sore.  It was recommended by her physical therapist that she be evaluated by orthopedics.  She has not had an injection to her shoulder.  She has never had a surgery to the shoulder.  She denies any significant weakness or instability in the shoulder.  She has not had an MRI of her shoulder.    She works as a .  She is a former smoker and quit 25 years ago.  She enjoys walking and yoga weightlifting for exercise.  Her shoulder is preventing her from doing these things currently.  She is leaving next Friday for a 10-day vacation in [place] with her .       Patient Active Problem List   Diagnosis     Family history of early CAD     Benign essential hypertension     Major depressive disorder, recurrent episode, in full remission (H)     Tendinitis of left rotator  cuff     Acute pain of left shoulder          Past Medical History:   Diagnosis Date     Benign essential hypertension 2023          Past Surgical History:   Procedure Laterality Date      SECTION        SECTION       COLONOSCOPY N/A 2020    Procedure: COLONOSCOPY;  Surgeon: Hyun Perdomo MD;  Location:  GI     COLONOSCOPY N/A 2020    Procedure: Colonoscopy, With Polypectomy And Biopsy;  Surgeon: Hyun Perdomo MD;  Location:  GI          Current Outpatient Medications   Medication Sig Dispense Refill     coenzyme Q-10 capsule Take 1 capsule by mouth daily       diclofenac (VOLTAREN) 75 MG EC tablet Take 1 tablet (75 mg) by mouth 2 times daily 60 tablet 0     escitalopram (LEXAPRO) 10 MG tablet Take 1 tablet (10 mg) by mouth daily for 30 days, THEN 0.5 tablets (5 mg) daily for 30 days. 30 tablet 1     ferrous sulfate 140 (45 Fe) MG TBCR CR tablet Take 140 mg by mouth daily       fish oil-omega-3 fatty acids 1000 MG capsule Take 2 g by mouth daily       lisinopril-hydrochlorothiazide (ZESTORETIC) 10-12.5 MG tablet Take 1 tablet by mouth daily Need labs for any future refills. 90 tablet 0     multivitamin w/minerals (THERA-VIT-M) tablet Take 1 tablet by mouth daily       Vitamin D3 (CHOLECALCIFEROL) 25 mcg (1000 units) tablet Take 1 tablet (1,000 Units) by mouth daily Take one tablet daily. 90 tablet 3          Allergies   Allergen Reactions     No Known Allergies           Family History   Problem Relation Age of Onset     Breast Cancer Mother 80     Melanoma Mother      Angina Father      Hypertension Father      Ovarian Cancer No family hx of           Social History     Tobacco Use     Smoking status: Former     Smokeless tobacco: Never     Tobacco comments:     Smoked for 10 years, quit 25 years ago   Substance Use Topics     Alcohol use: Yes     Comment: occ            Objective:  Physical Exam:  LUE: No open wounds cuts or lacerations of the shoulder.  No ecchymosis  or erythema.  No obvious deformity.  No pain to palpation of the clavicle, AC joint, acromion, or scapular spine.  Mild pain to palpation over the lateral aspect of the shoulder.  Pain to palpation focally over the long of the biceps in the bicipital groove.  Passive range of motion 170 degrees of flexion 160 degrees abduction 90 degrees external rotation T7 internal rotation limited by pain.  For comparison, right shoulder passive range of motion 180 degrees flexion, 170 degrees abduction, 90 degrees external rotation, T4 internal rotation.  Active range of motion is equivalent to passive range of motion with pain in the anterior and lateral aspects of the shoulder, particularly when reaching behind her back and when abducting greater than 90 degrees.  5 out of 5 strength in flexion, abduction, internal and external rotation.  Equivalent bearhug.  Speed positive for pain in the bicipital groove.  Cross body negative for pain at the AC joint.  Spart[place]'s positive for pain in the deep anterior shoulder relieved by supination.  Positive Marti test for impingement with pain over the lateral aspect of the shoulder.    Imagin view x-ray of the left shoulder from today was reviewed by myself.  This shows no fracture or acute bony pathology.  Well-maintained glenohumeral joint space without osteophyte formation or narrowing of the joint space.  No arthrosis of the AC joint noted.    Assessment and Plan: Patient is a 53-year-old female, right-hand-dominant, who presents with approximately 3 months of anterior lateral shoulder pain.  X-rays confirm no fracture, acute bony pathology, or signs of arthritis.  My clinical exam I suspect that she has biceps tendinitis as well as subacromial bursitis.  Given her symmetric passive range of motion in all planes except internal rotation I have low clinical suspicion for adhesive capsulitis at this time.  Given her full active range of motion and strength testing I have very low  suspicion for an acute rotator cuff tear at this time.  I discussed with her diagnosis as well as treatment options.  We discussed nonoperative management in the form of rest oral anti-inflammatory medication and physical therapy for range of motion and cuff strengthening.  Given the low likelihood of developing adhesive capsulitis I think would be appropriate to start light weight rotator cuff strengthening at this time.  I discussed with her the role of a prescription twice daily anti-inflammatory medication to help with with inflammation and pain control during the early physical therapy process and during her vacation.  I also discussed the potential role of corticosteroid injection to help with pain control if she feels that pain is limiting her ability to work with physical therapy.  I did discuss with her that it would be my preference to obtain an MRI prior to an injection to definitively rule out an acute rotator cuff tear.  I also discussed with her that he would certainly be reasonable to obtain an MRI now to confirm the diagnosis if that gave her peace of mind.  After long discussion of these options he is interested in starting with physical therapy and a prescription oral anti-inflammatory medication.  She would like to hold off on a steroid injection at this time, which I think is reasonable.  She would also like to hold off on MRI at this time and give the anti-inflammatory medication physical therapy a trial.  I think this is a very reasonable treatment plan.  I will prescribe her diclofenac twice daily to her pharmacy.  We will prescribe physical therapy for range of motion rotator cuff strengthening.  I will plan to follow-up with her in 6 to 8 weeks.  If she is struggling with pain control or if things change with her symptoms she can call back at any time and we will obtain an MRI without contrast of the left shoulder prior to her visit to discuss further treatment options.    Bong Durham,  MD        Again, thank you for allowing me to participate in the care of your patient.        Sincerely,        Bong Durham MD

## 2023-09-13 NOTE — LETTER
9/13/2023         RE: Karin DARBY [facility] [[email or web address]]  [address] [place] [address]      CC: Left Shoulder pain    HPI: Patient is a 53-year-old right-hand-dominant female presents today with left shoulder pain.  She states this started approximate 3 months ago.  She does not recall any inciting injury.  Is been getting progressively worse.  She localizes the pain to the anterior and lateral aspect of her shoulder.  She states the pain shoots down the anterior aspect of her upper arm as well and because of some spasms there.  Prior to this she had no issues with the left shoulder.  She has never had any like this before in the right shoulder.  She takes p.o. Advil as needed for the pain.  Over the last week or so the pain has been worsening and sometimes waking her up at night.  She has seen physical therapy at [facility] for 2 visits.  She was doing some range of motion and rotator cuff strengthening.  However she states her shoulder to be getting more sore.  It was recommended by her physical therapist that she be evaluated by orthopedics.  She has not had an injection to her shoulder.  She has never had a surgery to the shoulder.  She denies any significant weakness or instability in the shoulder.  She has not had an MRI of her shoulder.    She works as a .  She is a former smoker and quit 25 years ago.  She enjoys walking and yoga weightlifting for exercise.  Her shoulder is preventing her from doing these things currently.  She is leaving next Friday for a 10-day vacation in [place] with her .       Patient Active Problem List   Diagnosis     Family history of early CAD     Benign essential hypertension     Major depressive disorder, recurrent episode, in full remission (H)     Tendinitis of left rotator cuff     Acute pain of left shoulder          Past Medical History:   Diagnosis Date     Benign essential hypertension 1/6/2023          Past Surgical History:   Procedure  Laterality Date      SECTION        SECTION       COLONOSCOPY N/A 2020    Procedure: COLONOSCOPY;  Surgeon: Hyun Perdomo MD;  Location:  GI     COLONOSCOPY N/A 2020    Procedure: Colonoscopy, With Polypectomy And Biopsy;  Surgeon: Hyun Perdomo MD;  Location: [facility]          Current Outpatient Medications   Medication Sig Dispense Refill     coenzyme Q-10 capsule Take 1 capsule by mouth daily       diclofenac (VOLTAREN) 75 MG EC tablet Take 1 tablet (75 mg) by mouth 2 times daily 60 tablet 0     escitalopram (LEXAPRO) 10 MG tablet Take 1 tablet (10 mg) by mouth daily for 30 days, THEN 0.5 tablets (5 mg) daily for 30 days. 30 tablet 1     ferrous sulfate 140 (45 Fe) MG TBCR CR tablet Take 140 mg by mouth daily       fish oil-omega-3 fatty acids 1000 MG capsule Take 2 g by mouth daily       lisinopril-hydrochlorothiazide (ZESTORETIC) 10-12.5 MG tablet Take 1 tablet by mouth daily Need labs for any future refills. 90 tablet 0     multivitamin w/minerals (THERA-VIT-M) tablet Take 1 tablet by mouth daily       Vitamin D3 (CHOLECALCIFEROL) 25 mcg (1000 units) tablet Take 1 tablet (1,000 Units) by mouth daily Take one tablet daily. 90 tablet 3          Allergies   Allergen Reactions     No Known Allergies           Family History   Problem Relation Age of Onset     Breast Cancer Mother 80     Melanoma Mother      Angina Father      Hypertension Father      Ovarian Cancer No family hx of           Social History     Tobacco Use     Smoking status: Former     Smokeless tobacco: Never     Tobacco comments:     Smoked for 10 years, quit 25 years ago   Substance Use Topics     Alcohol use: Yes     Comment: occ            Objective:  Physical Exam:  LUE: No open wounds cuts or lacerations of the shoulder.  No ecchymosis or erythema.  No obvious deformity.  No pain to palpation of the clavicle, AC joint, acromion, or scapular spine.  Mild pain to palpation over the lateral aspect of the  shoulder.  Pain to palpation focally over the long of the biceps in the bicipital groove.  Passive range of motion 170 degrees of flexion 160 degrees abduction 90 degrees external rotation T7 internal rotation limited by pain.  For comparison, right shoulder passive range of motion 180 degrees flexion, 170 degrees abduction, 90 degrees external rotation, T4 internal rotation.  Active range of motion is equivalent to passive range of motion with pain in the anterior and lateral aspects of the shoulder, particularly when reaching behind her back and when abducting greater than 90 degrees.  5 out of 5 strength in flexion, abduction, internal and external rotation.  Equivalent bearhug.  Speed positive for pain in the bicipital groove.  Cross body negative for pain at the AC joint.  Mohave's positive for pain in the deep anterior shoulder relieved by supination.  Positive Marti test for impingement with pain over the lateral aspect of the shoulder.    Imagin view x-ray of the left shoulder from today was reviewed by myself.  This shows no fracture or acute bony pathology.  Well-maintained glenohumeral joint space without osteophyte formation or narrowing of the joint space.  No arthrosis of the AC joint noted.    Assessment and Plan: Patient is a 53-year-old female, right-hand-dominant, who presents with approximately 3 months of anterior lateral shoulder pain.  X-rays confirm no fracture, acute bony pathology, or signs of arthritis.  My clinical exam I suspect that she has biceps tendinitis as well as subacromial bursitis.  Given her symmetric passive range of motion in all planes except internal rotation I have low clinical suspicion for adhesive capsulitis at this time.  Given her full active range of motion and strength testing I have very low suspicion for an acute rotator cuff tear at this time.  I discussed with her diagnosis as well as treatment options.  We discussed nonoperative management in the form  of rest oral anti-inflammatory medication and physical therapy for range of motion and cuff strengthening.  Given the low likelihood of developing adhesive capsulitis I think would be appropriate to start light weight rotator cuff strengthening at this time.  I discussed with her the role of a prescription twice daily anti-inflammatory medication to help with with inflammation and pain control during the early physical therapy process and during her vacation.  I also discussed the potential role of corticosteroid injection to help with pain control if she feels that pain is limiting her ability to work with physical therapy.  I did discuss with her that it would be my preference to obtain an MRI prior to an injection to definitively rule out an acute rotator cuff tear.  I also discussed with her that he would certainly be reasonable to obtain an MRI now to confirm the diagnosis if that gave her peace of mind.  After long discussion of these options he is interested in starting with physical therapy and a prescription oral anti-inflammatory medication.  She would like to hold off on a steroid injection at this time, which I think is reasonable.  She would also like to hold off on MRI at this time and give the anti-inflammatory medication physical therapy a trial.  I think this is a very reasonable treatment plan.  I will prescribe her diclofenac twice daily to her pharmacy.  We will prescribe physical therapy for range of motion rotator cuff strengthening.  I will plan to follow-up with her in 6 to 8 weeks.  If she is struggling with pain control or if things change with her symptoms she can call back at any time and we will obtain an MRI without contrast of the left shoulder prior to her visit to discuss further treatment options.    MD Bong Lopez MD

## 2023-09-13 NOTE — PROGRESS NOTES
CC: Left Shoulder pain    HPI: Patient is a 53-year-old right-hand-dominant female presents today with left shoulder pain.  She states this started approximate 3 months ago.  She does not recall any inciting injury.  Is been getting progressively worse.  She localizes the pain to the anterior and lateral aspect of her shoulder.  She states the pain shoots down the anterior aspect of her upper arm as well and because of some spasms there.  Prior to this she had no issues with the left shoulder.  She has never had any like this before in the right shoulder.  She takes p.o. Advil as needed for the pain.  Over the last week or so the pain has been worsening and sometimes waking her up at night.  She has seen physical therapy at [facility] for 2 visits.  She was doing some range of motion and rotator cuff strengthening.  However she states her shoulder to be getting more sore.  It was recommended by her physical therapist that she be evaluated by orthopedics.  She has not had an injection to her shoulder.  She has never had a surgery to the shoulder.  She denies any significant weakness or instability in the shoulder.  She has not had an MRI of her shoulder.    She works as a .  She is a former smoker and quit 25 years ago.  She enjoys walking and yoga weightlifting for exercise.  Her shoulder is preventing her from doing these things currently.  She is leaving next Friday for a 10-day vacation in [place] with her .       Patient Active Problem List   Diagnosis    Family history of early CAD    Benign essential hypertension    Major depressive disorder, recurrent episode, in full remission (H)    Tendinitis of left rotator cuff    Acute pain of left shoulder          Past Medical History:   Diagnosis Date    Benign essential hypertension 2023          Past Surgical History:   Procedure Laterality Date     SECTION       SECTION      COLONOSCOPY N/A 2020     Procedure: COLONOSCOPY;  Surgeon: Hyun Perdomo MD;  Location:  GI    COLONOSCOPY N/A 9/8/2020    Procedure: Colonoscopy, With Polypectomy And Biopsy;  Surgeon: Hyun Perdomo MD;  Location:  GI          Current Outpatient Medications   Medication Sig Dispense Refill    coenzyme Q-10 capsule Take 1 capsule by mouth daily      diclofenac (VOLTAREN) 75 MG EC tablet Take 1 tablet (75 mg) by mouth 2 times daily 60 tablet 0    escitalopram (LEXAPRO) 10 MG tablet Take 1 tablet (10 mg) by mouth daily for 30 days, THEN 0.5 tablets (5 mg) daily for 30 days. 30 tablet 1    ferrous sulfate 140 (45 Fe) MG TBCR CR tablet Take 140 mg by mouth daily      fish oil-omega-3 fatty acids 1000 MG capsule Take 2 g by mouth daily      lisinopril-hydrochlorothiazide (ZESTORETIC) 10-12.5 MG tablet Take 1 tablet by mouth daily Need labs for any future refills. 90 tablet 0    multivitamin w/minerals (THERA-VIT-M) tablet Take 1 tablet by mouth daily      Vitamin D3 (CHOLECALCIFEROL) 25 mcg (1000 units) tablet Take 1 tablet (1,000 Units) by mouth daily Take one tablet daily. 90 tablet 3          Allergies   Allergen Reactions    No Known Allergies           Family History   Problem Relation Age of Onset    Breast Cancer Mother 80    Melanoma Mother     Angina Father     Hypertension Father     Ovarian Cancer No family hx of           Social History     Tobacco Use    Smoking status: Former    Smokeless tobacco: Never    Tobacco comments:     Smoked for 10 years, quit 25 years ago   Substance Use Topics    Alcohol use: Yes     Comment: occ            Objective:  Physical Exam:  LUE: No open wounds cuts or lacerations of the shoulder.  No ecchymosis or erythema.  No obvious deformity.  No pain to palpation of the clavicle, AC joint, acromion, or scapular spine.  Mild pain to palpation over the lateral aspect of the shoulder.  Pain to palpation focally over the long of the biceps in the bicipital groove.  Passive range of motion 170  degrees of flexion 160 degrees abduction 90 degrees external rotation T7 internal rotation limited by pain.  For comparison, right shoulder passive range of motion 180 degrees flexion, 170 degrees abduction, 90 degrees external rotation, T4 internal rotation.  Active range of motion is equivalent to passive range of motion with pain in the anterior and lateral aspects of the shoulder, particularly when reaching behind her back and when abducting greater than 90 degrees.  5 out of 5 strength in flexion, abduction, internal and external rotation.  Equivalent bearhug.  Speed positive for pain in the bicipital groove.  Cross body negative for pain at the AC joint.  Greenwood's positive for pain in the deep anterior shoulder relieved by supination.  Positive Marti test for impingement with pain over the lateral aspect of the shoulder.    Imagin view x-ray of the left shoulder from today was reviewed by myself.  This shows no fracture or acute bony pathology.  Well-maintained glenohumeral joint space without osteophyte formation or narrowing of the joint space.  No arthrosis of the AC joint noted.    Assessment and Plan: Patient is a 53-year-old female, right-hand-dominant, who presents with approximately 3 months of anterior lateral shoulder pain.  X-rays confirm no fracture, acute bony pathology, or signs of arthritis.  My clinical exam I suspect that she has biceps tendinitis as well as subacromial bursitis.  Given her symmetric passive range of motion in all planes except internal rotation I have low clinical suspicion for adhesive capsulitis at this time.  Given her full active range of motion and strength testing I have very low suspicion for an acute rotator cuff tear at this time.  I discussed with her diagnosis as well as treatment options.  We discussed nonoperative management in the form of rest oral anti-inflammatory medication and physical therapy for range of motion and cuff strengthening.  Given the  low likelihood of developing adhesive capsulitis I think would be appropriate to start light weight rotator cuff strengthening at this time.  I discussed with her the role of a prescription twice daily anti-inflammatory medication to help with with inflammation and pain control during the early physical therapy process and during her vacation.  I also discussed the potential role of corticosteroid injection to help with pain control if she feels that pain is limiting her ability to work with physical therapy.  I did discuss with her that it would be my preference to obtain an MRI prior to an injection to definitively rule out an acute rotator cuff tear.  I also discussed with her that he would certainly be reasonable to obtain an MRI now to confirm the diagnosis if that gave her peace of mind.  After long discussion of these options he is interested in starting with physical therapy and a prescription oral anti-inflammatory medication.  She would like to hold off on a steroid injection at this time, which I think is reasonable.  She would also like to hold off on MRI at this time and give the anti-inflammatory medication physical therapy a trial.  I think this is a very reasonable treatment plan.  I will prescribe her diclofenac twice daily to her pharmacy.  We will prescribe physical therapy for range of motion rotator cuff strengthening.  I will plan to follow-up with her in 6 to 8 weeks.  If she is struggling with pain control or if things change with her symptoms she can call back at any time and we will obtain an MRI without contrast of the left shoulder prior to her visit to discuss further treatment options.    Bong Durham MD

## 2023-09-15 ENCOUNTER — THERAPY VISIT (OUTPATIENT)
Dept: PHYSICAL THERAPY | Facility: CLINIC | Age: 53
End: 2023-09-15
Attending: STUDENT IN AN ORGANIZED HEALTH CARE EDUCATION/TRAINING PROGRAM
Payer: COMMERCIAL

## 2023-09-15 DIAGNOSIS — M25.512 ACUTE PAIN OF LEFT SHOULDER: Primary | ICD-10-CM

## 2023-09-15 PROCEDURE — 97530 THERAPEUTIC ACTIVITIES: CPT | Mod: GP | Performed by: PHYSICAL THERAPIST

## 2023-09-15 PROCEDURE — 97110 THERAPEUTIC EXERCISES: CPT | Mod: GP | Performed by: PHYSICAL THERAPIST

## 2023-09-15 NOTE — PROGRESS NOTES
09/15/23 0500   Appointment Info   Signing clinician's name / credentials Jocelyne Infante, PT, DPT, OCS   Total/Authorized Visits 6 E&T   Visits Used 3   Medical Diagnosis L shoulder pain   PT Tx Diagnosis L shoulder pain   Progress Note/Certification   Onset of illness/injury or Date of Surgery 02/01/23   Therapy Frequency 2 x month   Predicted Duration 3 months   Progress Note Due Date 09/22/23   Progress Note Completed Date 07/28/23   PT Goal 1   Goal Identifier Reaching   Goal Description Reach out to side and OH without pain   Rationale to maximize safety and independence with performance of ADLs and functional tasks;to maximize safety and independence within the home   Goal Progress Pain continues, no change since eval   Target Date 09/22/23   Subjective Report   Subjective Report Reports pain is about the same or slightly worse. She is fairly confident this isn't a frozen shoulder given her AROM. She has been resting from the exercises for a month or so as she continued to get worse rather than better and was concerned she was missing something. Interested to hear about dx today.   Objective Measures   Objective Measures Objective Measure 1   Objective Measure 1   Objective Measure Shoulder AROM   Details Full and symmetric, painful throughout entire abduction, painful arc flexion, sig pain with functional IR.   Treatment Interventions (PT)   Interventions Therapeutic Procedure/Exercise;Therapeutic Activity;Manual Therapy   Therapeutic Procedure/Exercise   Therapeutic Procedures: strength, endurance, ROM, flexibillity minutes (55704) 25   PTRx Ther Proc 1 Serratus Slides on Wall   PTRx Ther Proc 1 - Details x 15, yellow TB. Also trialed biceps bias but this was more painful   PTRx Ther Proc 2 Push-Up Plus At Counter   PTRx Ther Proc 2 - Details No Notes   PTRx Ther Proc 3 Supine Ceiling Punch   PTRx Ther Proc 3 - Details d/c   PTRx Ther Proc 4 Scapular Retraction/Depression   PTRx Ther Proc 4 - Details d/c    PTRx Ther Proc 5 Isometric Shoulder External Rotation   PTRx Ther Proc 5 - Details d/c   Ther Proc 1 HEP ed   Ther Proc 1 - Details exercise dosage is like oral medication dosage - need correct amount of correct medication in order to properly improve condition   Therapeutic Activity   Therapeutic Activities: dynamic activities to improve functional performance minutes (78379) 9  (throughout session)   Ther Act 1 Pt education on findings POC, prognosis for suspected tendinopathy   Ther Act 1 - Details relevant anatomy; 24 hour rule, pain rules (tolerance or stoplight analogy), prognosis is long term, Heavy slow resistance training or eccentric approach, avoid compression/stretch to tendon   Manual Therapy   Manual Therapy 1 MWM IR functional   Manual Therapy 1 - Details distraction + extension helpful 3 x 3 reps   Manual Therapy: Mobilization, MFR, MLD, friction massage minutes (38590) 3   Plan   Home program PTRx   Updates to plan of care + posterior cuff and SA work in tolerable ranges   Plan for next session MWM re-trial vs GH mobs, ACJ? flexion 90 bicep curl, consider taping for posterior cuff or for ACJ sling to take pull off biceps   Total Session Time   Timed Code Treatment Minutes 37   Total Treatment Time (sum of timed and untimed services) 37         PLAN  Continue therapy per current plan of care.  Biceps tendinopathy focus    Beginning/End Dates of Progress Note Reporting Period:  (P) 07/28/23 to 09/15/2023    Referring Provider:  Bong Durham

## 2023-09-19 ENCOUNTER — OFFICE VISIT (OUTPATIENT)
Dept: FAMILY MEDICINE | Facility: CLINIC | Age: 53
End: 2023-09-19
Attending: FAMILY MEDICINE
Payer: COMMERCIAL

## 2023-09-19 VITALS
HEART RATE: 57 BPM | HEIGHT: 65 IN | SYSTOLIC BLOOD PRESSURE: 127 MMHG | WEIGHT: 187 LBS | DIASTOLIC BLOOD PRESSURE: 85 MMHG | BODY MASS INDEX: 31.16 KG/M2

## 2023-09-19 DIAGNOSIS — J02.9 SORE THROAT: Primary | ICD-10-CM

## 2023-09-19 LAB — GROUP A STREP BY PCR: NOT DETECTED

## 2023-09-19 PROCEDURE — G0463 HOSPITAL OUTPT CLINIC VISIT: HCPCS | Performed by: FAMILY MEDICINE

## 2023-09-19 PROCEDURE — 99213 OFFICE O/P EST LOW 20 MIN: CPT | Performed by: FAMILY MEDICINE

## 2023-09-19 PROCEDURE — 87651 STREP A DNA AMP PROBE: CPT | Performed by: FAMILY MEDICINE

## 2023-09-19 NOTE — NURSING NOTE
Chief Complaint   Patient presents with    Pharyngitis     Possible strep, painful sore throat

## 2023-09-19 NOTE — PROGRESS NOTES
"CC: Pharyngitis (Possible strep, painful sore throat)      SUBJECTIVE: Karin is a 53 year old female with HTN who comes in with the following concerns:     Sore throat since yesterday  Swollen nodes  No cough  No fever  Fatigue  Sore throat  Nausea  No vomiting  No known sick contacts  Took a home Covid test and negative      OBJECTIVE:   /85   Pulse 57   Ht 1.638 m (5' 4.5\")   Wt 84.8 kg (187 lb)   LMP 01/05/2022   BMI 31.60 kg/m    General: Alert and oriented in no acute distress.  Skin: Clear without lesions or rashes.   Lymph: Bilateral tender anterior cervical lymphadenopathy.   Eyes: PERRL. EOMI.   ENT: TMs intact and pearly gray. Oropharynx moist, erythematous, without lesions or exudate. Supple neck.  Cardio: RRR, normal S1 and S2, without murmurs, rubs, or gallops appreciated.    Resp: CTA bilaterally. Normal respiratory effort.     Results for orders placed or performed in visit on 09/19/23   Group A Streptococcus PCR Throat Swab     Status: Normal    Specimen: Throat; Swab   Result Value Ref Range    Group A strep by PCR Not Detected Not Detected    Narrative    The Xpert Xpress Strep A test, performed on the [employer]  Instrument [employer], is a rapid, qualitative in vitro diagnostic test for the detection of Streptococcus pyogenes (Group A ß-hemolytic Streptococcus, Strep A) in throat swab specimens from patients with signs and symptoms of pharyngitis. The Xpert Xpress Strep A test can be used as an aid in the diagnosis of Group A Streptococcal pharyngitis. The assay is not intended to monitor treatment for Group A Streptococcus infections. The Xpert Xpress Strep A test utilizes an automated real-time polymerase chain reaction (PCR) to detect Streptococcus pyogenes DNA.       ASSESSMENT/PLAN:   Karin was seen today for pharyngitis.    Diagnoses and all orders for this visit:    Sore throat  -     Group A Streptococcus PCR Throat Swab    Strep PCR negative. Likely viral pharyngitis. " Symptomatic treatment recommend (rest, hydration, OTC medications). Call if not improving over the next 7-10 days. Worrisome signs and symptoms were discussed with Karin and she was instructed to return to the clinic for concerning symptoms or to call with questions.      Shahla Billings MD  Family Medicine

## 2023-09-19 NOTE — PATIENT INSTRUCTIONS
Thank you for trusting us with your care!     If you need to contact us for questions about:  Symptoms, Scheduling & Medical Questions; Non-urgent (2-3 day response) Timur message, Urgent (needing response today) [phone] (if after 3:30pm next day response)   Prescriptions: Please call your Pharmacy   Billing: [facility] [phone] or [facility]:[phone]

## 2023-10-05 DIAGNOSIS — F33.42 MAJOR DEPRESSIVE DISORDER, RECURRENT EPISODE, IN FULL REMISSION (H): ICD-10-CM

## 2023-10-06 RX ORDER — ESCITALOPRAM OXALATE 10 MG/1
TABLET ORAL
Qty: 90 TABLET | Refills: 1 | Status: SHIPPED | OUTPATIENT
Start: 2023-10-06 | End: 2023-12-05

## 2023-10-06 NOTE — TELEPHONE ENCOUNTER
Escitalopram (Lexapro) 10 mg    Last Office Visit: 9/19/23  Future [facility] Appointments: None  Medication last refilled: 11/11/22 #30 with 1 refill(s)    PHQ-9 / GUNNER-7 Scores  7/21/21  3/11/22 2/1/23   GUNNER-7 Score DocFlow 0 0 0   PHQ-9 Score DocFlow 1 0 0     Prescription approved per [facility] Refill Protocol.    Juju Meadows, ESHAN, RN, CCM

## 2023-10-13 ENCOUNTER — THERAPY VISIT (OUTPATIENT)
Dept: PHYSICAL THERAPY | Facility: CLINIC | Age: 53
End: 2023-10-13
Payer: COMMERCIAL

## 2023-10-13 DIAGNOSIS — M25.512 ACUTE PAIN OF LEFT SHOULDER: Primary | ICD-10-CM

## 2023-10-13 PROCEDURE — 97112 NEUROMUSCULAR REEDUCATION: CPT | Mod: GP | Performed by: PHYSICAL THERAPIST

## 2023-10-13 PROCEDURE — 97110 THERAPEUTIC EXERCISES: CPT | Mod: GP | Performed by: PHYSICAL THERAPIST

## 2023-10-13 PROCEDURE — 97530 THERAPEUTIC ACTIVITIES: CPT | Mod: GP | Performed by: PHYSICAL THERAPIST

## 2023-10-23 ENCOUNTER — TELEPHONE (OUTPATIENT)
Dept: ORTHOPEDICS | Facility: CLINIC | Age: 53
End: 2023-10-23
Payer: COMMERCIAL

## 2023-10-23 DIAGNOSIS — M67.912 DISORDER OF LEFT ROTATOR CUFF: Primary | ICD-10-CM

## 2023-10-23 NOTE — TELEPHONE ENCOUNTER
Health Call Center    Phone Message    May a detailed message be left on voicemail: yes     Reason for Call: Order(s): Other:   Reason for requested: MRI for LT upper shoulder, recommended by PT   Date needed: asap  Provider name: Dr. Durham to [facility] location     Action Taken: Other: BU    Travel Screening: Not Applicable

## 2023-10-23 NOTE — TELEPHONE ENCOUNTER
Please see patient's phone call requesting a left shoulder MRI, per their physical therapist's request.     MRI pended, please sign if appropriate and patient will be provided with the scheduling number.    Rossana Rojas, TC, LAT, ATC  Certified Athletic Trainer

## 2023-10-27 ENCOUNTER — THERAPY VISIT (OUTPATIENT)
Dept: PHYSICAL THERAPY | Facility: CLINIC | Age: 53
End: 2023-10-27
Payer: COMMERCIAL

## 2023-10-27 DIAGNOSIS — M25.512 ACUTE PAIN OF LEFT SHOULDER: Primary | ICD-10-CM

## 2023-10-27 PROCEDURE — 97530 THERAPEUTIC ACTIVITIES: CPT | Mod: GP | Performed by: PHYSICAL THERAPIST

## 2023-10-27 PROCEDURE — 97110 THERAPEUTIC EXERCISES: CPT | Mod: 59 | Performed by: PHYSICAL THERAPIST

## 2023-10-27 NOTE — PROGRESS NOTES
10/27/23 0500   Appointment Info   Signing clinician's name / credentials Jocelyne Infante, PT, DPT, OCS   Total/Authorized Visits 6 E&T   Visits Used 5   Medical Diagnosis L shoulder pain   PT Tx Diagnosis L shoulder pain   Progress Note/Certification   Onset of illness/injury or Date of Surgery 02/01/23   Therapy Frequency 2 x month   Predicted Duration 3 months   Progress Note Due Date 11/14/23   Progress Note Completed Date 09/15/23   PT Goal 1   Goal Identifier Reaching   Goal Description Reach out to side and OH without pain   Rationale to maximize safety and independence with performance of ADLs and functional tasks;to maximize safety and independence within the home   Goal Progress Pain continues, no change since eval   Target Date 09/22/23   Subjective Report   Subjective Report Shoulder continues to feel worse. She cannot wash her hair or reach behind her to do her bra at this point due to pain. She is getting an MRI next week which I think is reasonable.   Objective Measures   Objective Measures Objective Measure 1;Objective Measure 2   Objective Measure 1   Objective Measure Shoulder AROM   Details flexion: 144 (PROM 168), abduction 74, painful arc / ER at side 40 end range pain / Functional IR L1 (T8 unaffected side) / ER 20 EMPTY end feel, IR 34 EMPTY end feel   Objective Measure 2   Objective Measure Special tests   Details no pain with palpation over LH biceps tendon, + Obriens (SLAP?), + Modified Dynamic Speeds   Treatment Interventions (PT)   Interventions Therapeutic Procedure/Exercise;Therapeutic Activity;Manual Therapy;Neuromuscular Re-education   Therapeutic Procedure/Exercise   Therapeutic Procedures: strength, endurance, ROM, flexibillity minutes (38667) 30   Ther Proc 1 HEP ed   Ther Proc 1 - Details change in ex due to sx irritation.   PTRx Ther Proc 1 Serratus Slides on Wall   PTRx Ther Proc 1 - Details d/c too aggressive   PTRx Ther Proc 2 Push-Up Plus At Counter   PTRx Ther Proc 2 -  Details d/c too agressive   PTRx Ther Proc 3 Wand Shoulder External Rotation in Neutral   PTRx Ther Proc 3 - Details x 12, also IR to stomach at side   PTRx Ther Proc 4 Wand Shoulder Flexion Supine   PTRx Ther Proc 4 - Details x12   Skilled Intervention Targeting ROM restoration   Patient Response/Progress tolerated well   Therapeutic Activity   Therapeutic Activities: dynamic activities to improve functional performance minutes (66300) 10   Ther Act 1 Pt education on findings POC, prognosis for suspected tendinopathy   Ther Act 1 - Details relevant anatomy, suspect RC vs labrum tear - still could be a conservative case but MRI will inform tx at this point. Many tears are degenerative and do not improve imaging-wise but can improve sx wise.   PTRx Ther Act 1 Education Sheet General   PTRx Ther Act 1 - Details x 5 min ice massage. Precautions given   Skilled Intervention ed as above   Patient Response/Progress appropriate questions   Ther Act 2 ankle DF AROM (self MWM)   Ther Act 2 - Details DF - self TCJ MWM. x 20 at pt's request as she twisted her ankle on vacation. Advised she also do some balance work   Therapeutic Activities Ther Act 2   Neuromuscular Re-education   PTRx Neuro Re-ed 1 Shoulder Scapular Retraction with Tubing   PTRx Neuro Re-ed 1 - Details d/c too agressive   PTRx Neuro Re-ed 2 Quadruped Scapular Stabilization and Proprioception   PTRx Neuro Re-ed 2 - Details HEP   Skilled Intervention targeting stability in mid ranges for comfort   Patient Response/Progress tolerated well   Manual Therapy   Manual Therapy 1 MWM IR functional   Plan   Home program PTRx   Updates to plan of care + posterior cuff and SA work in tolerable ranges   Plan for next session GH mobs, ACJ? flexion 90 bicep curl, consider taping for posterior cuff or for ACJ sling to take pull off biceps // labrum considerations?   Total Session Time   Timed Code Treatment Minutes 40   Total Treatment Time (sum of timed and untimed  services) 40         PLAN  Change POC approach to ROM restoration/sx modulation. MRI next week for diagnostics. Will adjust PT frequency following this.    Beginning/End Dates of Progress Note Reporting Period:  09/15/23 to 10/27/2023    Referring Provider:  Bong Durham

## 2023-10-31 ENCOUNTER — HOSPITAL ENCOUNTER (OUTPATIENT)
Dept: GENERAL RADIOLOGY | Facility: HOSPITAL | Age: 53
Discharge: HOME | End: 2023-10-31
Attending: PODIATRIST | Admitting: PODIATRIST
Payer: COMMERCIAL

## 2023-10-31 ENCOUNTER — OFFICE VISIT (OUTPATIENT)
Dept: PODIATRY | Facility: CLINIC | Age: 53
End: 2023-10-31
Payer: COMMERCIAL

## 2023-10-31 VITALS
RESPIRATION RATE: 10 BRPM | HEART RATE: 74 BPM | TEMPERATURE: 97.6 F | SYSTOLIC BLOOD PRESSURE: 138 MMHG | OXYGEN SATURATION: 100 % | DIASTOLIC BLOOD PRESSURE: 84 MMHG

## 2023-10-31 DIAGNOSIS — S93.602A FOOT SPRAIN, LEFT, INITIAL ENCOUNTER: ICD-10-CM

## 2023-10-31 DIAGNOSIS — M79.672 LEFT FOOT PAIN: Primary | ICD-10-CM

## 2023-10-31 DIAGNOSIS — M79.672 LEFT FOOT PAIN: ICD-10-CM

## 2023-10-31 PROCEDURE — 73630 X-RAY EXAM OF FOOT: CPT | Mod: 26 | Performed by: PODIATRIST

## 2023-10-31 PROCEDURE — 99203 OFFICE O/P NEW LOW 30 MIN: CPT | Performed by: PODIATRIST

## 2023-10-31 PROCEDURE — 73630 X-RAY EXAM OF FOOT: CPT | Mod: LT

## 2023-10-31 ASSESSMENT — PAIN SCALES - GENERAL: PAINLEVEL: MODERATE PAIN (5)

## 2023-10-31 NOTE — PROGRESS NOTES
FOOT AND ANKLE SURGERY/PODIATRY CONSULT NOTE         ASSESSMENT:   Left foot pain  Sprain left foot      TREATMENT:  An x-ray of the left foot was taken today.  The x-rays were read and interpreted by this physician.  I informed the patient the x-rays were negative for any fractures or dislocations.  I recommended the patient ambulate in the cam boot for the next 4 to 6 weeks.   She is to return to the clinic if her pain persist at which time I would recommend an MRI of her left foot.        HPI: Karin Santacruz presented to the clinic today complaining of moderate to severe pain involving her left foot.  The patient stated that approximately 5 weeks ago she was traveling to [place].  She was walking and had a mishap and fell and injured her left foot and ankle.  Following the injury she did have some pain but she was able to continue walking on her foot for the next 2 weeks.  She stated however that over the past several days her pain seems to have increased.  She has not had any signs of redness or swelling.  She describes it as a sharp pain.  She has some mild aching pain while resting.  She does find herself limping as a result of the pain.  She has no numbness or tingling associated with her pain.  She has taken ibuprofen on occasion.  She denies any other previous treatment.    Past Medical History:   Diagnosis Date    Benign essential hypertension 1/6/2023       Social History     Socioeconomic History    Marital status:      Spouse name: Not on file    Number of children: Not on file    Years of education: Not on file    Highest education level: Not on file   Occupational History    Not on file   Tobacco Use    Smoking status: Former    Smokeless tobacco: Never    Tobacco comments:     Smoked for 10 years, quit 25 years ago   Vaping Use    Vaping Use: Never used   Substance and Sexual Activity    Alcohol use: Yes     Comment: occ    Drug use: Never    Sexual activity: Yes     Partners: Male     Birth  control/protection: None   Other Topics Concern    Not on file   Social History Narrative    Not on file     Social Determinants of Health     Financial Resource Strain: Not on file   Food Insecurity: Not on file   Transportation Needs: Not on file   Physical Activity: Not on file   Stress: Not on file   Social Connections: Not on file   Interpersonal Safety: Not on file   Housing Stability: Not on file          Allergies   Allergen Reactions    No Known Allergies           Current Outpatient Medications:     coenzyme Q-10 capsule, Take 1 capsule by mouth daily, Disp: , Rfl:     diclofenac (VOLTAREN) 75 MG EC tablet, Take 1 tablet (75 mg) by mouth 2 times daily, Disp: 60 tablet, Rfl: 0    escitalopram (LEXAPRO) 10 MG tablet, TAKE 1 TABLET (10 MG) BY MOUTH DAILY FOR 30 DAYS, THEN 0.5 TABLETS (5 MG) DAILY FOR 30 DAYS., Disp: 90 tablet, Rfl: 1    ferrous sulfate 140 (45 Fe) MG TBCR CR tablet, Take 140 mg by mouth daily, Disp: , Rfl:     fish oil-omega-3 fatty acids 1000 MG capsule, Take 2 g by mouth daily, Disp: , Rfl:     lisinopril-hydrochlorothiazide (ZESTORETIC) 10-12.5 MG tablet, Take 1 tablet by mouth daily Need labs for any future refills., Disp: 90 tablet, Rfl: 0    multivitamin w/minerals (THERA-VIT-M) tablet, Take 1 tablet by mouth daily, Disp: , Rfl:     Vitamin D3 (CHOLECALCIFEROL) 25 mcg (1000 units) tablet, Take 1 tablet (1,000 Units) by mouth daily Take one tablet daily., Disp: 90 tablet, Rfl: 3    Current Facility-Administered Medications:     triamcinolone (KENALOG-40) injection 40 mg, 40 mg, , , Lewis Coyle MD, 40 mg at 02/28/22 0942     Family History   Problem Relation Age of Onset    Breast Cancer Mother 80    Melanoma Mother     Angina Father     Hypertension Father     Ovarian Cancer No family hx of         Social History     Socioeconomic History    Marital status:      Spouse name: Not on file    Number of children: Not on file    Years of education: Not on file    Highest  education level: Not on file   Occupational History    Not on file   Tobacco Use    Smoking status: Former    Smokeless tobacco: Never    Tobacco comments:     Smoked for 10 years, quit 25 years ago   Vaping Use    Vaping Use: Never used   Substance and Sexual Activity    Alcohol use: Yes     Comment: occ    Drug use: Never    Sexual activity: Yes     Partners: Male     Birth control/protection: None   Other Topics Concern    Not on file   Social History Narrative    Not on file     Social Determinants of Health     Financial Resource Strain: Not on file   Food Insecurity: Not on file   Transportation Needs: Not on file   Physical Activity: Not on file   Stress: Not on file   Social Connections: Not on file   Interpersonal Safety: Not on file   Housing Stability: Not on file        Review of Systems - Patient denies fever, chills, rash, wound, stiffness, , numbness, weakness, heart burn, blood in stool, chest pain with activity, calf pain when walking, shortness of breath with activity, chronic cough, easy bleeding/bruising, swelling of ankles, excessive thirst, fatigue, depression, anxiety.  Patient admits to left foot pain.      OBJECTIVE:  Appearance: alert, well appearing, and in no distress.    LMP 01/05/2022      There is no height or weight on file to calculate BMI.     General appearance: Patient is alert and fully cooperative with history & exam.  No sign of distress is noted during the visit.  Psychiatric: Affect is pleasant & appropriate.  Patient appears motivated to improve health.  Respiratory: Breathing is regular & unlabored while sitting.  HEENT: Hearing is intact to spoken word.  Speech is clear.  No gross evidence of visual impairment that would impact ambulation.    Vascular: Dorsalis pedis and posterior tibial pulses are palpable. There is no pedal hair growth bilaterally.  CFT < 3 sec from anterior tibial surface to distal digits bilaterally. There is no appreciable edema noted.  Dermatologic:  Turgor and texture are within normal limits. No coloration or temperature changes. No primary or secondary lesions noted.  Neurologic: All epicritic and proprioceptive sensations are grossly intact bilaterally.  Musculoskeletal: All active and passive ankle, subtalar, midtarsal, and 1st MPJ range of motion are grossly intact without pain or crepitus, with the exception of none. Manual muscle strength is within normal limits bilaterally. All dorsiflexors, plantarflexors, invertors, evertors are intact bilaterally. Tenderness present to the dorsal aspect of the left foot on palpation. Tenderness to the dorsal aspect of the left foot near the talonavicular joint with range of motion. Calf is soft/non-tender without warmth/induration    Imaging:       No images are attached to the encounter or orders placed in the encounter.     No results found.   No results found.     Jas Chowdhury DPM  [facility] Foot & Ankle Surgery/Podiatry

## 2023-10-31 NOTE — Clinical Note
"    10/31/2023         RE: Karin Santacruz  [address] [place] [address]        Dear Colleague,    Thank you for referring your patient, Karin Santacruz, to the [facility]. Please see a copy of my visit note below.    FOOT AND ANKLE SURGERY/PODIATRY CONSULT NOTE         ASSESSMENT:   ***      TREATMENT:  ***        HPI: I was asked to see Karin Santacruz today  ***.  The patient was seen in consultation at the request of *** for ***.     {PAST MEDICAL HISTORY:[id number]::\"***\"}    {SOCIAL HISTORY:403108112::\"***\"}       Allergies   Allergen Reactions     No Known Allergies           Current Outpatient Medications:      coenzyme Q-10 capsule, Take 1 capsule by mouth daily, Disp: , Rfl:      diclofenac (VOLTAREN) 75 MG EC tablet, Take 1 tablet (75 mg) by mouth 2 times daily, Disp: 60 tablet, Rfl: 0     escitalopram (LEXAPRO) 10 MG tablet, TAKE 1 TABLET (10 MG) BY MOUTH DAILY FOR 30 DAYS, THEN 0.5 TABLETS (5 MG) DAILY FOR 30 DAYS., Disp: 90 tablet, Rfl: 1     ferrous sulfate 140 (45 Fe) MG TBCR CR tablet, Take 140 mg by mouth daily, Disp: , Rfl:      fish oil-omega-3 fatty acids 1000 MG capsule, Take 2 g by mouth daily, Disp: , Rfl:      lisinopril-hydrochlorothiazide (ZESTORETIC) 10-12.5 MG tablet, Take 1 tablet by mouth daily Need labs for any future refills., Disp: 90 tablet, Rfl: 0     multivitamin w/minerals (THERA-VIT-M) tablet, Take 1 tablet by mouth daily, Disp: , Rfl:      Vitamin D3 (CHOLECALCIFEROL) 25 mcg (1000 units) tablet, Take 1 tablet (1,000 Units) by mouth daily Take one tablet daily., Disp: 90 tablet, Rfl: 3    Current Facility-Administered Medications:      triamcinolone (KENALOG-40) injection 40 mg, 40 mg, , , Lewis Coyle MD, 40 mg at 02/28/22 0942     Family History   Problem Relation Age of Onset     Breast Cancer Mother 80     Melanoma Mother      Angina Father      Hypertension Father      Ovarian Cancer No family hx of         Social History " "    Socioeconomic History     Marital status:      Spouse name: Not on file     Number of children: Not on file     Years of education: Not on file     Highest education level: Not on file   Occupational History     Not on file   Tobacco Use     Smoking status: Former     Smokeless tobacco: Never     Tobacco comments:     Smoked for 10 years, quit 25 years ago   Vaping Use     Vaping Use: Never used   Substance and Sexual Activity     Alcohol use: Yes     Comment: occ     Drug use: Never     Sexual activity: Yes     Partners: Male     Birth control/protection: None   Other Topics Concern     Not on file   Social History Narrative     Not on file     Social Determinants of Health     Financial Resource Strain: Not on file   Food Insecurity: Not on file   Transportation Needs: Not on file   Physical Activity: Not on file   Stress: Not on file   Social Connections: Not on file   Interpersonal Safety: Not on file   Housing Stability: Not on file        Review of Systems - Patient denies fever, chills, rash, wound, stiffness, limping, numbness, weakness, heart burn, blood in stool, chest pain with activity, calf pain when walking, shortness of breath with activity, chronic cough, easy bleeding/bruising, swelling of ankles, excessive thirst, fatigue, depression, anxiety.  Patient admits to ***.      OBJECTIVE:  Appearance: {appearance:104744::\"alert, well appearing, and in no distress\"}.    LMP 01/05/2022      There is no height or weight on file to calculate BMI.     General appearance: Patient is alert and fully cooperative with history & exam.  No sign of distress is noted during the visit.  Psychiatric: Affect is pleasant & appropriate.  Patient appears motivated to improve health.  Respiratory: Breathing is regular & unlabored while sitting.  HEENT: Hearing is intact to spoken word.  Speech is clear.  No gross evidence of visual impairment that would impact ambulation.    Vascular: Dorsalis pedis and posterior " tibial pulses are palpable. There is pedal hair growth ***.  CFT < 3 sec from anterior tibial surface to distal digits ***. There is no appreciable edema noted.  Dermatologic: Turgor and texture are within normal limits. No coloration or temperature changes. No primary or secondary lesions noted.  Neurologic: All epicritic and proprioceptive sensations are grossly intact ***.  Musculoskeletal: All active and passive ankle, subtalar, midtarsal, and 1st MPJ range of motion are grossly intact without pain or crepitus, with the exception of ***. Manual muscle strength is ***. All dorsiflexors, plantarflexors, invertors, evertors are intact ***. Tenderness present to *** on palpation. Tenderness to *** with range of motion. Calf is soft/non-tender with/without warmth/induration    Imaging:     {Imaging order/result:74327}  No images are attached to the encounter or orders placed in the encounter.     No results found.   No results found.       TREATMENT:  ***    Jas Chowdhury DPM  [facility] Foot & Ankle Surgery/Podiatry         Again, thank you for allowing me to participate in the care of your patient.        Sincerely,        Jas Ng DPM

## 2023-11-02 ENCOUNTER — ANCILLARY PROCEDURE (OUTPATIENT)
Dept: MRI IMAGING | Facility: CLINIC | Age: 53
End: 2023-11-02
Attending: STUDENT IN AN ORGANIZED HEALTH CARE EDUCATION/TRAINING PROGRAM
Payer: COMMERCIAL

## 2023-11-02 DIAGNOSIS — M67.912 DISORDER OF LEFT ROTATOR CUFF: ICD-10-CM

## 2023-11-02 PROCEDURE — 73221 MRI JOINT UPR EXTREM W/O DYE: CPT | Mod: LT

## 2023-11-06 ENCOUNTER — TELEPHONE (OUTPATIENT)
Dept: ORTHOPEDICS | Facility: CLINIC | Age: 53
End: 2023-11-06
Payer: COMMERCIAL

## 2023-11-06 NOTE — TELEPHONE ENCOUNTER
Please see request for MRI results below and advise if patient needs to come in for results or if they can be called.     Reason for Call:  Request for results:    Name of test or procedure: MRI left shoulder    Date of test of procedure: 11/2/23      Study Result    Narrative & Impression   EXAM: MR SHOULDER LEFT W/O CONTRAST  LOCATION: [facility]  DATE: 11/2/2023     INDICATION:  Disorder of left rotator cuff.  COMPARISON: Shoulder radiographic exam 09/13/2023.  TECHNIQUE: Unenhanced.     FINDINGS:     ROTATOR CUFF:  -Supraspinatus: Mild tendinopathy. No significant tear.  -Infraspinatus: No significant tendinopathy or discrete tear.  -Subscapularis: No significant tendinopathy or discrete tear.  -Teres minor: No tendinopathy or tear.     CORACOACROMIAL ARCH:  -Morphology: Type II acromion. Intact coracoacromial and coracoclavicular ligaments. No subacromial spur. Preserved acromiohumeral space.   -Bursa: Small amount of fluid in the subacromial-subdeltoid bursa.     ACROMIOCLAVICULAR JOINT:   -No substantial arthrosis.      LONG HEAD OF BICEPS TENDON:   -No tendinopathy or tear. No tenosynovitis or subluxation.     GLENOHUMERAL JOINT:   -Labrum: Possible small tear of the anteroinferior labrum. Shoulder arthrogram could further assess as indicated clinically. No SLAP tear.   -Cartilage: No focal cartilage defect or subchondral marrow edema.   -Joint space: Small glenohumeral joint effusion.  -Glenohumeral ligaments and capsule: Mild thickening of the inferior glenohumeral capsule with a small amount of adjacent edema or inflammation.     BONES:   -No fracture or concerning marrow replacing lesion.     SOFT TISSUES:   -Normal deltoid muscle bulk. Normal visualized chest wall and axilla.                                                                      IMPRESSION:  1.  Mild left supraspinatus tendinopathy.   2.  No discrete rotator cuff tear.  3.  Mild subacromial-subdeltoid  bursitis.  4.  Findings which can be seen with adhesive capsulitis.  5.  Possible small anteroinferior labral tear. MR left shoulder arthrogram could further assess.  6.  No significant acromioclavicular joint arthrosis.          ABIODUN Arceo RN

## 2023-11-06 NOTE — TELEPHONE ENCOUNTER
[facility]    Phone Message    May a detailed message be left on voicemail: no     Reason for Call: Requesting Results     Name/type of test: MRI left shoulder  Date of test: 11/02  Was test done at a location other than [facility] (Please fill in the location if not [facility])?: No      Patient not available between 3:30 & 4 pm

## 2023-11-07 ENCOUNTER — TELEPHONE (OUTPATIENT)
Dept: ORTHOPEDICS | Facility: CLINIC | Age: 53
End: 2023-11-07
Payer: COMMERCIAL

## 2023-11-07 DIAGNOSIS — M25.512 ACUTE PAIN OF LEFT SHOULDER: Primary | ICD-10-CM

## 2023-11-07 NOTE — TELEPHONE ENCOUNTER
Please see phone encounter from Dr Durham today, 11/7, for the MRI results discussion with patient.    Rossana Rojas, TC, LAT, ATC  Certified Athletic Trainer

## 2023-11-07 NOTE — TELEPHONE ENCOUNTER
CC: Follow-up left shoulder MRI.    This is a telephone conversation to discuss left shoulder MRI results.  Patient states that her left shoulder pain and range of motion have been worsening.  She notes more pain in both the anterior aspect of her shoulder, posterior aspect, and radiating down the lateral side of her arm.  She is noted that her range of motion is decreasing.  She has been doing physical therapy but they have been having to cut back on her exercises due to her debilitating pain.  We discussed the results of her MRI.  I not see any full-thickness rotator cuff tears.  I do not see any pathology of the long of the biceps of the labrum.  I do not see any chondral wear of the humeral head or the glenoid.  There is some mild subacromial bursitis.  I discussed with her today that I do not see any thing on her MRI that meets an operative indication.  I am concerned given her reported history on the phone that she may be developing adhesive capsulitis of the left shoulder.  I would like her to follow-up in clinic for physical exam.  If left frozen shoulder is confirmed on clinical diagnosis, I like to get her set up for an intra-articular ultrasound-guided glenohumeral injection followed by physical therapy for range of motion for her left frozen shoulder.  Adapting answer questions at this time.  She is understanding.  She is in agreement with the plan.      10 minutes were spent on the phone with the patient.    Bong Durham MD  Orthopedic Surgery

## 2023-11-07 NOTE — TELEPHONE ENCOUNTER
I let patient know she does not need to come in & Dr Durham will call her today. She is not available from 3-430 pm to today

## 2023-11-13 ENCOUNTER — THERAPY VISIT (OUTPATIENT)
Dept: PHYSICAL THERAPY | Facility: CLINIC | Age: 53
End: 2023-11-13
Attending: STUDENT IN AN ORGANIZED HEALTH CARE EDUCATION/TRAINING PROGRAM
Payer: COMMERCIAL

## 2023-11-13 ENCOUNTER — OFFICE VISIT (OUTPATIENT)
Dept: ORTHOPEDICS | Facility: CLINIC | Age: 53
End: 2023-11-13
Payer: COMMERCIAL

## 2023-11-13 VITALS — WEIGHT: 188 LBS | BODY MASS INDEX: 31.32 KG/M2 | HEIGHT: 65 IN

## 2023-11-13 DIAGNOSIS — M75.02 ADHESIVE CAPSULITIS OF LEFT SHOULDER: Primary | ICD-10-CM

## 2023-11-13 DIAGNOSIS — M75.02 ADHESIVE CAPSULITIS OF LEFT SHOULDER: ICD-10-CM

## 2023-11-13 DIAGNOSIS — M25.512 ACUTE PAIN OF LEFT SHOULDER: Primary | ICD-10-CM

## 2023-11-13 PROCEDURE — 20611 DRAIN/INJ JOINT/BURSA W/US: CPT | Mod: LT | Performed by: STUDENT IN AN ORGANIZED HEALTH CARE EDUCATION/TRAINING PROGRAM

## 2023-11-13 PROCEDURE — 99213 OFFICE O/P EST LOW 20 MIN: CPT | Performed by: STUDENT IN AN ORGANIZED HEALTH CARE EDUCATION/TRAINING PROGRAM

## 2023-11-13 PROCEDURE — 97110 THERAPEUTIC EXERCISES: CPT | Mod: GP | Performed by: PHYSICAL THERAPIST

## 2023-11-13 PROCEDURE — 97140 MANUAL THERAPY 1/> REGIONS: CPT | Mod: GP | Performed by: PHYSICAL THERAPIST

## 2023-11-13 RX ORDER — ROPIVACAINE HYDROCHLORIDE 5 MG/ML
2 INJECTION, SOLUTION EPIDURAL; INFILTRATION; PERINEURAL
Status: DISCONTINUED | OUTPATIENT
Start: 2023-11-13 | End: 2024-07-30

## 2023-11-13 RX ORDER — LIDOCAINE HYDROCHLORIDE 10 MG/ML
3 INJECTION, SOLUTION INFILTRATION; PERINEURAL
Status: DISCONTINUED | OUTPATIENT
Start: 2023-11-13 | End: 2024-07-30

## 2023-11-13 RX ADMIN — LIDOCAINE HYDROCHLORIDE 3 ML: 10 INJECTION, SOLUTION INFILTRATION; PERINEURAL at 15:22

## 2023-11-13 RX ADMIN — ROPIVACAINE HYDROCHLORIDE 2 ML: 5 INJECTION, SOLUTION EPIDURAL; INFILTRATION; PERINEURAL at 15:22

## 2023-11-13 NOTE — LETTER
11/13/2023         RE: Karin Santacruz  [address] [place] [address]        Dear Colleague,    Thank you for referring your patient, Karin Santacruz, to the [facility] [place]. Please see a copy of my visit note below.    CC: Left shoulder pain and stiffness    HPI: Patient is a 53 year old, right hand dominant female seen today in follow up for left shoulder pain and stiffness.  She is known to my clinic.  For full history and physical as well as MRI results please see my last 2 notes.  In short she was previously seen for left shoulder pain.  I started her on oral NSAIDs and PT for range of motion cuff strengthening.  She was about to go on a trip to [place].  She states that throughout the trip the pain began to worsen and her shoulders seem to get stiffer.  Physical therapy has not seemed to help.  The NSAIDs are not controlling her pain.  MRI was ordered.  I reviewed the MRI results with her over the phone.  This showed chronic tendinosis of her rotator cuff and biceps tendon but without any nieves tears.  No chondral damage noted off the humeral head or the glenoid.  She localized the pain over the anterior lateral aspect of the shoulder.  She functions pretty well below shoulder height without any significant weakness.  However when she tries to get her arm above the shoulder, either actively or passively, she notes significant pain.  She has pain when she rolls over at it at night    Objective:   PE:  LUE: No pain to palpation over the scapular spine spine or acromion.  No pain to palpation over the AC joint or clavicle.  Mild pain to palpation over the lateral aspect of the shoulder in the anterior aspect of the shoulder.  Passive range of motion 90 degrees of forward flexion with heart mechanical block.  90 degrees of abduction with mechanical block.  30 degrees external rotation.  Beltline internal rotation.  Active range of motion is: To passive range of  motion.  With her arms below 90 degrees, she does have 4+ out of 5 strength in flexion and abduction.  5/5 strength with internal and external rotation      A/P:  Patient is a 53-year-old female who presents today with progressing left shoulder adhesive capsulitis.  At this time she is meeting clinical indications with heart mechanical block to range of motion greater than 3 planes.  MRI is negative for arthritis or rotator cuff pathology.  I discussed her diagnosis with her.  I discussed with her that this is likely idiopathic.  Treatment mainstay is nonoperative management with gentle stretching and progressive passive range of motion with physical therapy.  This can take several months to see full results.  Intra-articular glenohumeral corticosteroid injection with gentle capsular stretching is a reasonable modality to help with pain and to jumpstart her physical therapy.  We discussed the risk benefits include but not limited to bleeding, infection, allergic reaction, and failure to cure pain.  I the opportunity answer questions.  She is in agreement with an intra-articular glenohumeral corticosteroid injection followed by physical therapy.  I would refer her over to my colleague, Dr. Hewitt, of sports medicine for ultrasound-guided left glenohumeral injection.  She is going to go to physical therapy for her first PT appointment directly after that.  I will renew her physical therapy prescription for twice a week for 6 weeks for gentle progressive passive range of motion active range of motion for adhesive capsulitis.  No strengthening until full range of motion is achieved.  She is going to follow-up with Dr. Hewitt.  In 6 to 8 weeks to check her progress    Bong Durham  Orthopedic Surgery        Again, thank you for allowing me to participate in the care of your patient.        Sincerely,        Bong Durham MD

## 2023-11-13 NOTE — LETTER
11/13/2023         RE: Karin Santcaruz  [address] [place] [address]        Dear Colleague,    Thank you for referring your patient, Karin Santacruz, to the [facility] [place]. Please see a copy of my visit note below.    Sports Medicine Procedure Note    Diagnoses and all orders for this visit:    Adhesive capsulitis of left shoulder    Other orders  -     Cancel: Large Joint Injection/Arthocentesis  -     Large Joint Injection/Arthocentesis: L glenohumeral joint        Karin Santacruz is a/an 53 year old female who is seen for Corticosteroid injection of L glenohumeral joint at the request of Dr. Ken Durham  Last injection: 2/8/22 in the knee.     -CSI with capsular distension performed today under USG, see procedure note below for details   -Post-injection instructions were provided to the patient    Return if symptoms worsen or fail to improve.      Post-Injection Discharge Instructions    You may shower, however avoid swimming, tub baths or hot tubs for 24 hours following your procedure  You may have a mild to moderate increase in pain for a few days following the injection.  The lidocaine (local numbing medicine) will wear off in several hours. It usually takes 3-5 days for the steroid medication to start working although it may take up to 14 days for full effect.   You may use ice packs for 10-15 minutes, 3 to 4 times a day at the injection site for comfort if needed  You may use extra strength Tylenol for pain control if necessary   If you were fasting, you may resume your normal diet and medications after the procedure  If you have diabetes, your blood sugar may be higher than normal for 10-14 days following a steroid injection. Contact your doctor who manages your diabetes if your blood sugar is significantly higher than usual    If you experience any of the following, call Sports Medicine @ [phone] or [phone]  -Fever over 100 degrees  F  -Swelling, bleeding, redness, drainage, warmth at the injection site  -New or significant worsening pain        Dr. NETO Hewitt DO CAQ  [facility]  Sports Medicine     -----        Large Joint Injection/Arthocentesis: L glenohumeral joint    Date/Time: 11/13/2023 3:22 PM    Performed by: Samir Hewitt DO  Authorized by: Samir Hewitt DO    Indications:  Pain and osteoarthritis  Needle Size:  22 G  Guidance: ultrasound    Approach:  Posterior  Location:  Shoulder      Site:  L glenohumeral joint  Medications:  3 mL lidocaine 1 %; 2 mL ROPivacaine 5 MG/ML  Medications comment:  20ccs of sterile water were injected after the steroid to provide capsular distension  Outcome:  Tolerated well, no immediate complications  Procedure discussed: discussed risks, benefits, and alternatives    Consent Given by:  Patient  Timeout: timeout called immediately prior to procedure    Prep: patient was prepped and draped in usual sterile fashion     Ultrasound was used to ensure safe and accurate needle placement and injection. Ultrasound images of the procedure were permanently stored.    1ml of 8.4% Sodium Bicarbonate solution was used to buffer the local numbing agent for today's injection        Again, thank you for allowing me to participate in the care of your patient.        Sincerely,        Samir Hewitt DO

## 2023-11-13 NOTE — PROGRESS NOTES
CC: Left shoulder pain and stiffness    HPI: Patient is a 53 year old, right hand dominant female seen today in follow up for left shoulder pain and stiffness.  She is known to my clinic.  For full history and physical as well as MRI results please see my last 2 notes.  In short she was previously seen for left shoulder pain.  I started her on oral NSAIDs and PT for range of motion cuff strengthening.  She was about to go on a trip to [place].  She states that throughout the trip the pain began to worsen and her shoulders seem to get stiffer.  Physical therapy has not seemed to help.  The NSAIDs are not controlling her pain.  MRI was ordered.  I reviewed the MRI results with her over the phone.  This showed chronic tendinosis of her rotator cuff and biceps tendon but without any nieves tears.  No chondral damage noted off the humeral head or the glenoid.  She localized the pain over the anterior lateral aspect of the shoulder.  She functions pretty well below shoulder height without any significant weakness.  However when she tries to get her arm above the shoulder, either actively or passively, she notes significant pain.  She has pain when she rolls over at it at night    Objective:   PE:  LUE: No pain to palpation over the scapular spine spine or acromion.  No pain to palpation over the AC joint or clavicle.  Mild pain to palpation over the lateral aspect of the shoulder in the anterior aspect of the shoulder.  Passive range of motion 90 degrees of forward flexion with heart mechanical block.  90 degrees of abduction with mechanical block.  30 degrees external rotation.  Beltline internal rotation.  Active range of motion is: To passive range of motion.  With her arms below 90 degrees, she does have 4+ out of 5 strength in flexion and abduction.  5/5 strength with internal and external rotation      A/P:  Patient is a 53-year-old female who presents today with progressing left shoulder adhesive capsulitis.  At this  time she is meeting clinical indications with heart mechanical block to range of motion greater than 3 planes.  MRI is negative for arthritis or rotator cuff pathology.  I discussed her diagnosis with her.  I discussed with her that this is likely idiopathic.  Treatment mainstay is nonoperative management with gentle stretching and progressive passive range of motion with physical therapy.  This can take several months to see full results.  Intra-articular glenohumeral corticosteroid injection with gentle capsular stretching is a reasonable modality to help with pain and to jumpstart her physical therapy.  We discussed the risk benefits include but not limited to bleeding, infection, allergic reaction, and failure to cure pain.  I the opportunity answer questions.  She is in agreement with an intra-articular glenohumeral corticosteroid injection followed by physical therapy.  I would refer her over to my colleague, Dr. Hewitt, of sports medicine for ultrasound-guided left glenohumeral injection.  She is going to go to physical therapy for her first PT appointment directly after that.  I will renew her physical therapy prescription for twice a week for 6 weeks for gentle progressive passive range of motion active range of motion for adhesive capsulitis.  No strengthening until full range of motion is achieved.  She is going to follow-up with Dr. Hewitt.  In 6 to 8 weeks to check her progress    Bong Durham  Orthopedic Surgery

## 2023-11-13 NOTE — PROGRESS NOTES
Sports Medicine Procedure Note    Diagnoses and all orders for this visit:    Adhesive capsulitis of left shoulder    Other orders  -     Cancel: Large Joint Injection/Arthocentesis  -     Large Joint Injection/Arthocentesis: L glenohumeral joint        Karin Santacruz is a/an 53 year old female who is seen for Corticosteroid injection of L glenohumeral joint at the request of Dr. Ken Durham  Last injection: 2/8/22 in the knee.     -CSI with capsular distension performed today under USG, see procedure note below for details   -Post-injection instructions were provided to the patient    Return if symptoms worsen or fail to improve.      Post-Injection Discharge Instructions    You may shower, however avoid swimming, tub baths or hot tubs for 24 hours following your procedure  You may have a mild to moderate increase in pain for a few days following the injection.  The lidocaine (local numbing medicine) will wear off in several hours. It usually takes 3-5 days for the steroid medication to start working although it may take up to 14 days for full effect.   You may use ice packs for 10-15 minutes, 3 to 4 times a day at the injection site for comfort if needed  You may use extra strength Tylenol for pain control if necessary   If you were fasting, you may resume your normal diet and medications after the procedure  If you have diabetes, your blood sugar may be higher than normal for 10-14 days following a steroid injection. Contact your doctor who manages your diabetes if your blood sugar is significantly higher than usual    If you experience any of the following, call Sports Medicine @ [phone] or [phone]  -Fever over 100 degrees F  -Swelling, bleeding, redness, drainage, warmth at the injection site  -New or significant worsening pain        Dr. NETO Hewitt, DO CAQ  [facility]  Sports Medicine     -----        Large Joint Injection/Arthocentesis: L glenohumeral joint    Date/Time:  11/13/2023 3:22 PM    Performed by: Samir Hewitt DO  Authorized by: Samir Hewitt DO    Indications:  Pain and osteoarthritis  Needle Size:  22 G  Guidance: ultrasound    Approach:  Posterior  Location:  Shoulder      Site:  L glenohumeral joint  Medications:  3 mL lidocaine 1 %; 2 mL ROPivacaine 5 MG/ML  Medications comment:  20ccs of sterile water were injected after the steroid to provide capsular distension  Outcome:  Tolerated well, no immediate complications  Procedure discussed: discussed risks, benefits, and alternatives    Consent Given by:  Patient  Timeout: timeout called immediately prior to procedure    Prep: patient was prepped and draped in usual sterile fashion     Ultrasound was used to ensure safe and accurate needle placement and injection. Ultrasound images of the procedure were permanently stored.    1ml of 8.4% Sodium Bicarbonate solution was used to buffer the local numbing agent for today's injection

## 2023-11-14 NOTE — PROGRESS NOTES
11/13/23 0500   Appointment Info   Signing clinician's name / credentials Porsha Ortega, PT, OCS   Total/Authorized Visits 6 E&T   Visits Used 6   Medical Diagnosis L shoulder pain   PT Tx Diagnosis L shoulder pain   Other pertinent information patient 12 min late due to MD running behind for injection   Progress Note/Certification   Onset of illness/injury or Date of Surgery 02/01/23   Therapy Frequency 2 x month   Predicted Duration 3 months   Progress Note Due Date 11/13/23   Progress Note Completed Date 11/13/23   PT Goal 1   Goal Identifier Reaching   Goal Description Reach out to side and OH without pain   Rationale to maximize safety and independence with performance of ADLs and functional tasks;to maximize safety and independence within the home   Goal Progress Pain continues, no change since eval   Target Date 01/09/24   Subjective Report   Subjective Report Here today post-injection (5 minutes ago) for L shoulder frozen shoulder/pain and limited ROM. Numbness present.   Objective Measures   Objective Measures Objective Measure 1   Objective Measure 1   Objective Measure L PROM   Details ER ~ 30 deg, abduction 65, flexion ~115. Surface numbness but sharp pain felt at end range with all.   Treatment Interventions (PT)   Interventions Therapeutic Procedure/Exercise;Manual Therapy   Therapeutic Procedure/Exercise   Therapeutic Procedures: strength, endurance, ROM, flexibillity minutes (11365) 30   Ther Proc 1 HEP ed   Ther Proc 1 - Details change in ex due to sx irritation.   PTRx Ther Proc 1 Pendulum/Codmans   PTRx Ther Proc 1 - Details x10 f/b, cw/ccw, side/side, 1-2x day.   PTRx Ther Proc 2 Standing Passive Shoulder Flexion   PTRx Ther Proc 2 - Details hands on counter x 10, 1-2 x day   PTRx Ther Proc 3 Supported Shoulder External Rotation   PTRx Ther Proc 3 - Details x10, 1-2x day   PTRx Ther Proc 4 Wand Shoulder Flexion Supine   PTRx Ther Proc 4 - Details x12   Skilled Intervention focus on PROM  within pain limits   Patient Response/Progress still some sharp pain at end range   Therapeutic Activity   Therapeutic Activities Ther Act 2   Ther Act 2 ankle DF AROM (self MWM)  (NT)   Ther Act 2 - Details DF - self TCJ MWM. x 20 at pt's request as she twisted her ankle on vacation. Advised she also do some balance work  (NT)   PTRx Ther Act 1 Education Sheet General   PTRx Ther Act 1 - Details Advised on ice post-exercises   Skilled Intervention to focus on L shoulder PROM   Patient Response/Progress good understanding of HEP   Neuromuscular Re-education   PTRx Neuro Re-ed 1 Shoulder Scapular Retraction with Tubing   PTRx Neuro Re-ed 1 - Details d/c too agressive   PTRx Neuro Re-ed 2 Quadruped Scapular Stabilization and Proprioception   PTRx Neuro Re-ed 2 - Details HEP   Manual Therapy   Manual Therapy 1 L shoulder oscillation/Gr I-II GH mobs x 10 minutes in supine   Manual Therapy: Mobilization, MFR, MLD, friction massage minutes (53383) 10   Skilled Intervention to encourage less L shoulder guarding.   Patient Response/Progress good tolerance   Plan   Home program PTRx   Plan for next session focus 1x week on PROM   Total Session Time   Timed Code Treatment Minutes 40   Total Treatment Time (sum of timed and untimed services) 40         PLAN  Continue therapy per current plan of care.    Beginning/End Dates of Progress Note Reporting Period:  11/13/23 to 11/13/2023    Referring Provider:  Bong Durham

## 2023-11-27 ENCOUNTER — THERAPY VISIT (OUTPATIENT)
Dept: PHYSICAL THERAPY | Facility: CLINIC | Age: 53
End: 2023-11-27
Payer: COMMERCIAL

## 2023-11-27 DIAGNOSIS — M25.512 ACUTE PAIN OF LEFT SHOULDER: Primary | ICD-10-CM

## 2023-11-27 PROCEDURE — 97110 THERAPEUTIC EXERCISES: CPT | Mod: GP | Performed by: PHYSICAL THERAPIST

## 2023-11-27 PROCEDURE — 97530 THERAPEUTIC ACTIVITIES: CPT | Mod: GP | Performed by: PHYSICAL THERAPIST

## 2023-12-04 ENCOUNTER — MYC MEDICAL ADVICE (OUTPATIENT)
Dept: FAMILY MEDICINE | Facility: CLINIC | Age: 53
End: 2023-12-04
Payer: COMMERCIAL

## 2023-12-09 DIAGNOSIS — I10 BENIGN ESSENTIAL HYPERTENSION: ICD-10-CM

## 2023-12-12 RX ORDER — LISINOPRIL/HYDROCHLOROTHIAZIDE 10-12.5 MG
1 TABLET ORAL DAILY
Qty: 90 TABLET | Refills: 0 | Status: SHIPPED | OUTPATIENT
Start: 2023-12-12 | End: 2024-03-18

## 2023-12-15 ENCOUNTER — OFFICE VISIT (OUTPATIENT)
Dept: DERMATOLOGY | Facility: CLINIC | Age: 53
End: 2023-12-15
Payer: COMMERCIAL

## 2023-12-15 DIAGNOSIS — L81.4 LENTIGINES: ICD-10-CM

## 2023-12-15 DIAGNOSIS — Z12.83 ENCOUNTER FOR SCREENING FOR MALIGNANT NEOPLASM OF SKIN: ICD-10-CM

## 2023-12-15 DIAGNOSIS — D22.9 MULTIPLE NEVI: Primary | ICD-10-CM

## 2023-12-15 DIAGNOSIS — Z80.8 FAMILY HISTORY OF MELANOMA: ICD-10-CM

## 2023-12-15 DIAGNOSIS — L82.1 SEBORRHEIC KERATOSES: ICD-10-CM

## 2023-12-15 PROCEDURE — 99203 OFFICE O/P NEW LOW 30 MIN: CPT | Performed by: PHYSICIAN ASSISTANT

## 2023-12-15 ASSESSMENT — PAIN SCALES - GENERAL: PAINLEVEL: NO PAIN (0)

## 2023-12-15 NOTE — NURSING NOTE
Dermatology Rooming Note    Karin Santacruz's goals for this visit include:   Chief Complaint   Patient presents with    Skin Check     Karin is here for a skin check and has no spots of concern.     Yanick Ron, EMT

## 2023-12-15 NOTE — PROGRESS NOTES
[facility] Dermatology Note  Encounter Date: Dec 15, 2023  Office Visit     Reviewed patients past medical history and pertinent chart review prior to patients visit today.     Dermatology Problem List:  1. Family history melanoma (mother).   2. History of mildly atypical nevus, left breast, 2020.     ____________________________________________    Assessment & Plan:     # Multiple nevi  # Solar lentigines  - No concerning features on dermoscopy. We discussed the importance of self exams at home.   - ABCDEs: Counseled ABCDEs of melanoma: Asymmetry, Border (irregularity), Color (not uniform, changes in color), Diameter (greater than 6 mm which is about the size of a pencil eraser), and Evolving (any changes in preexisting moles).  - Sun protection: Counseled SPF 30+ sunscreen, UPF clothing, sun avoidance, tanning bed avoidance.    # Seborrheic keratoses  - We discussed the benign nature of the skin lesions. No treatment required. Continued observation recommended. Follow up with any concerns.      Follow-up:  Annual for follow up full body skin exam, as needed for new or changing lesions or new concerns    Caprice Mathew PA-C  Dermatology   ____________________________________________    CC: Skin Check (Karin is here for a skin check and has no spots of concern.)    HPI:  Ms. Karin Santacruz is a(n) 53 year old female who presents today as a return patient for a full body skin cancer screening. The patient denies a personal history of skin cancer, but has a family history of melanoma in her mother.  No specific cutaneous concerns today. The patient reports trying to be diligent with photoprotection.      Physical Exam:  Vitals: LMP 01/05/2022   SKIN: Total skin excluding the genitalia areas was performed. The exam included the scalp, face, neck, bilateral arms, chest, back, abdomen, bilateral legs, digits, mons pubis, buttocks, and nails.   - Pineda II - III  - Multiple tan/brown macules  and papules scattered throughout exam, consistent with benign nevi. No concerning features on dermoscopy.   - Scattered tan, homogenous macules scattered on sun exposed skin, consistent with solar lentigines.   - Scattered waxy, stuck on appearing papules and patches, consistent with seborrheic keratoses.      Medications:  Current Outpatient Medications   Medication    coenzyme Q-10 capsule    ferrous sulfate 140 (45 Fe) MG TBCR CR tablet    fish oil-omega-3 fatty acids 1000 MG capsule    lisinopril-hydrochlorothiazide (ZESTORETIC) 10-12.5 MG tablet    multivitamin w/minerals (THERA-VIT-M) tablet    Vitamin D3 (CHOLECALCIFEROL) 25 mcg (1000 units) tablet     Current Facility-Administered Medications   Medication    2 mL ropivacaine (NAROPIN) injection 5 mg/mL    lidocaine 1 % injection 3 mL    triamcinolone (KENALOG-40) injection 40 mg      Past Medical History:   Patient Active Problem List   Diagnosis    Family history of early CAD    Benign essential hypertension    Major depressive disorder, recurrent episode, in full remission (H24)    Tendinitis of left rotator cuff    Acute pain of left shoulder     Past Medical History:   Diagnosis Date    Benign essential hypertension 1/6/2023       CC Referred Self, MD  No address on file on close of this encounter.

## 2023-12-15 NOTE — PATIENT INSTRUCTIONS
Patient Education        Proper skin care from [facility]:     -Eliminate harsh soaps as they strip the natural oils from the skin, often resulting in dry itchy skin ( i.e. Dial, Zest, Khmer Spring)  -Use mild soaps such as Cetaphil or Dove Sensitive Skin in the shower. You do not need to use soap on arms, legs, and trunk every time you shower unless visibly soiled.   -Avoid hot or cold showers.  -After showering, lightly dry off and apply moisturizing within 2-3 minutes. This will help trap moisture in the skin.   -Aggressive use of a moisturizer at least 1-2 times a day to the entire body (including -Vanicream, Cetaphil, Aquaphor or Cerave) and moisturize hands after every washing.  -We recommend using moisturizers that come in a tub that needs to be scooped out, not a pump. This has more of an oil base. It will hold moisture in your skin much better than a water base moisturizer. The above recommended are non-pore clogging.        Wear a sunscreen with at least SPF 30 on your face, ears, neck and V of the chest daily. Wear sunscreen on other areas of the body if those areas are exposed to the sun throughout the day. Sunscreens can contain physical and/or chemical blockers. Physical blockers are less likely to clog pores, these include zinc oxide and titanium dioxide. Reapply every two hour and after swimming.      Sunscreen examples: https://www.ewg.org/sunscreen/     UV radiation  UVA radiation remains constant throughout the day and throughout the year. It is a longer wavelength than UVB and therefore penetrates deeper into the skin leading to immediate and delayed tanning, photoaging, and skin cancer. 70-80% of UVA and UVB radiation occurs between the hours of 10am-2pm.  UVB radiation  UVB radiation causes the most harmful effects and is more significant during the summer months. However, snow and ice can reflect UVB radiation leading to skin damage during the winter months as well. UVB radiation is  responsible for tanning, burning, inflammation, delayed erythema (pinkness), pigmentation (brown spots), and skin cancer.      I recommend self monthly full body exams and yearly full body exams with a dermatology provider. If you develop a new or changing lesion please follow up for examination. Most skin cancers are pink and scaly or pink and pearly. However, we do see blue/brown/black skin cancers.  Consider the ABCDEs of melanoma when giving yourself your monthly full body exam ( don't forget the groin, buttocks, feet, toes, etc). A-asymmetry, B-borders, C-color, D-diameter, E-elevation or evolving. If you see any of these changes please follow up in clinic. If you cannot see your back I recommend purchasing a hand held mirror to use with a larger wall mirror.       Checking for Skin Cancer  You can find cancer early by checking your skin each month. There are 3 kinds of skin cancer. They are melanoma, basal cell carcinoma, and squamous cell carcinoma. Doing monthly skin checks is the best way to find new marks or skin changes. Follow the instructions below for checking your skin.   The ABCDEs of checking moles for melanoma   Check your moles or growths for signs of melanoma using ABCDE:   Asymmetry: the sides of the mole or growth don t match  Border: the edges are ragged, notched, or blurred  Color: the color within the mole or growth varies  Diameter: the mole or growth is larger than 6 mm (size of a pencil eraser)  Evolving: the size, shape, or color of the mole or growth is changing (evolving is not shown in the images below)    Checking for other types of skin cancer  Basal cell carcinoma or squamous cell carcinoma have symptoms such as:      A spot or mole that looks different from all other marks on your skin  Changes in how an area feels, such as itching, tenderness, or pain  Changes in the skin's surface, such as oozing, bleeding, or scaliness  A sore that does not heal  New swelling or redness beyond  the border of a mole     Who s at risk?  Anyone can get skin cancer. But you are at greater risk if you have:   Fair skin, light-colored hair, or light-colored eyes  Many moles or abnormal moles on your skin  A history of sunburns from sunlight or tanning beds  A family history of skin cancer  A history of exposure to radiation or chemicals  A weakened immune system  If you have had skin cancer in the past, you are at risk for recurring skin cancer.   How to check your skin  Do your monthly skin checkups in front of a full-length mirror. Check all parts of your body, including your:   Head (ears, face, neck, and scalp)  Torso (front, back, and sides)  Arms (tops, undersides, upper, and lower armpits)  Hands (palms, backs, and fingers, including under the nails)  Buttocks and genitals  Legs (front, back, and sides)  Feet (tops, soles, toes, including under the nails, and between toes)  If you have a lot of moles, take digital photos of them each month. Make sure to take photos both up close and from a distance. These can help you see if any moles change over time.   Most skin changes are not cancer. But if you see any changes in your skin, call your doctor right away. Only he or she can diagnose a problem. If you have skin cancer, seeing your doctor can be the first step toward getting the treatment that could save your life.   [facility] last reviewed this educational content on 4/1/2019 2000-2020 [employer]. [address] [place] [address], [place] [address]. All rights reserved. This information is not intended as a substitute for professional medical care. Always follow your healthcare professional's instructions.        When should I call my doctor?  If you are worsening or not improving, please, contact us or seek urgent care as noted below.      Who should I call with questions (adults)?  [facility] (adult and pediatric): [phone]  [facility]  [place] (adult): [phone]  [facility] ([place], [place], [place] and [place]) [phone]  For urgent needs outside of business hours call the [facility] at [phone] and ask for the dermatology resident on call to be paged  If this is a medical emergency and you are unable to reach an ER, Call 911        If you need a prescription refill, please contact your pharmacy. Refills are approved or denied by our Physicians during normal business hours, Monday through Fridays  Per office policy, refills will not be granted if you have not been seen within the past year (or sooner depending on your child's condition)

## 2023-12-15 NOTE — LETTER
12/15/2023       RE: Karin Santacruz  [address] [place] [address]     Dear Colleague,    Thank you for referring your patient, Karin Santacruz, to the [facility]MAT[facility] [place] [facility]. Please see a copy of my visit note below.    [facility] Dermatology Note  Encounter Date: Dec 15, 2023  Office Visit     Reviewed patients past medical history and pertinent chart review prior to patients visit today.     Dermatology Problem List:  1. Family history melanoma (mother).   2. History of mildly atypical nevus, left breast, 2020.     ____________________________________________    Assessment & Plan:     # Multiple nevi  # Solar lentigines  - No concerning features on dermoscopy. We discussed the importance of self exams at home.   - ABCDEs: Counseled ABCDEs of melanoma: Asymmetry, Border (irregularity), Color (not uniform, changes in color), Diameter (greater than 6 mm which is about the size of a pencil eraser), and Evolving (any changes in preexisting moles).  - Sun protection: Counseled SPF 30+ sunscreen, UPF clothing, sun avoidance, tanning bed avoidance.    # Seborrheic keratoses  - We discussed the benign nature of the skin lesions. No treatment required. Continued observation recommended. Follow up with any concerns.      Follow-up:  Annual for follow up full body skin exam, as needed for new or changing lesions or new concerns    Caprice Mathew PA-C  Dermatology   ____________________________________________    CC: Skin Check (Karin is here for a skin check and has no spots of concern.)    HPI:  Ms. Karin Santacruz is a(n) 53 year old female who presents today as a return patient for a full body skin cancer screening. The patient denies a personal history of skin cancer, but has a family history of melanoma in her mother.  No specific cutaneous concerns today. The patient reports trying to be diligent  with photoprotection.      Physical Exam:  Vitals: LMP 01/05/2022   SKIN: Total skin excluding the genitalia areas was performed. The exam included the scalp, face, neck, bilateral arms, chest, back, abdomen, bilateral legs, digits, mons pubis, buttocks, and nails.   - Pineda II - III  - Multiple tan/brown macules and papules scattered throughout exam, consistent with benign nevi. No concerning features on dermoscopy.   - Scattered tan, homogenous macules scattered on sun exposed skin, consistent with solar lentigines.   - Scattered waxy, stuck on appearing papules and patches, consistent with seborrheic keratoses.      Medications:  Current Outpatient Medications   Medication    coenzyme Q-10 capsule    ferrous sulfate 140 (45 Fe) MG TBCR CR tablet    fish oil-omega-3 fatty acids 1000 MG capsule    lisinopril-hydrochlorothiazide (ZESTORETIC) 10-12.5 MG tablet    multivitamin w/minerals (THERA-VIT-M) tablet    Vitamin D3 (CHOLECALCIFEROL) 25 mcg (1000 units) tablet     Current Facility-Administered Medications   Medication    2 mL ropivacaine (NAROPIN) injection 5 mg/mL    lidocaine 1 % injection 3 mL    triamcinolone (KENALOG-40) injection 40 mg      Past Medical History:   Patient Active Problem List   Diagnosis    Family history of early CAD    Benign essential hypertension    Major depressive disorder, recurrent episode, in full remission (H24)    Tendinitis of left rotator cuff    Acute pain of left shoulder     Past Medical History:   Diagnosis Date    Benign essential hypertension 1/6/2023       CC Referred Self, MD  No address on file on close of this encounter.

## 2023-12-18 ENCOUNTER — THERAPY VISIT (OUTPATIENT)
Dept: PHYSICAL THERAPY | Facility: CLINIC | Age: 53
End: 2023-12-18
Payer: COMMERCIAL

## 2023-12-18 DIAGNOSIS — M25.512 ACUTE PAIN OF LEFT SHOULDER: Primary | ICD-10-CM

## 2023-12-18 PROCEDURE — 97110 THERAPEUTIC EXERCISES: CPT | Mod: GP | Performed by: PHYSICAL THERAPIST

## 2023-12-18 PROCEDURE — 97530 THERAPEUTIC ACTIVITIES: CPT | Mod: GP | Performed by: PHYSICAL THERAPIST

## 2023-12-29 ENCOUNTER — THERAPY VISIT (OUTPATIENT)
Dept: PHYSICAL THERAPY | Facility: CLINIC | Age: 53
End: 2023-12-29
Payer: COMMERCIAL

## 2023-12-29 DIAGNOSIS — M25.512 ACUTE PAIN OF LEFT SHOULDER: Primary | ICD-10-CM

## 2023-12-29 PROCEDURE — 97110 THERAPEUTIC EXERCISES: CPT | Mod: 59 | Performed by: PHYSICAL THERAPIST

## 2023-12-29 PROCEDURE — 97530 THERAPEUTIC ACTIVITIES: CPT | Mod: GP | Performed by: PHYSICAL THERAPIST

## 2023-12-29 PROCEDURE — 97112 NEUROMUSCULAR REEDUCATION: CPT | Mod: 59 | Performed by: PHYSICAL THERAPIST

## 2023-12-29 NOTE — PROGRESS NOTES
12/29/23 0500   Appointment Info   Signing clinician's name / credentials Jocelyne Infante, PT, DPT, OCS   Total/Authorized Visits 12   Visits Used 9   Medical Diagnosis L shoulder pain   PT Tx Diagnosis L shoulder pain   Progress Note/Certification   Onset of illness/injury or Date of Surgery 02/01/23   Therapy Frequency 2 x month   Predicted Duration 3 months   Progress Note Due Date 01/15/24   Progress Note Completed Date 11/13/23   PT Goal 1   Goal Identifier Reaching   Goal Description Reach out to side and OH without pain   Rationale to maximize safety and independence with performance of ADLs and functional tasks;to maximize safety and independence within the home   Goal Progress see objective, small changes likely indicating frozen stage   Target Date 01/09/24   Subjective Report   Subjective Report Feeling mostly the same, but feeling like she can manage her sx better. Using her arm with tasks that don't involve reaching. HEP most days.   Objective Measures   Objective Measures Objective Measure 1;Objective Measure 3   Objective Measure 1   Objective Measure L PROM   Details ER at side 25 deg, abduction 70, flexion 124. Surface numbness but sharp pain felt at end range with all.   Objective Measure 2   Objective Measure L AROM   Details ER at side: 35 deg painfree, 45 deg pain full, abduction 65, flexion 123. Counselled for pain free ROM   Objective Measure 3   Objective Measure Sx irritability   Details moderate (not much to provoke sx and takes 30+ min for sx to decrease)   PT Modalities   PT Modalities Electrical Stimulation   Electrical Stimulation   Treatment Detail TENS (see below)   Patient Response/Progress tolerated well; skin precautions given   Treatment Interventions (PT)   Interventions Therapeutic Procedure/Exercise;Manual Therapy   Therapeutic Procedure/Exercise   Therapeutic Procedures: strength, endurance, ROM, flexibillity minutes (61173) 10   Therapeutic Procedures Ther Proc 2;Ther Proc 3    Ther Proc 1 HEP ed   Ther Proc 1 - Details Mod sx irritability   Ther Proc 2 Pulleys   Ther Proc 2 - Details flexion, scaption, ER x 8 min   Ther Proc 3 Foam roller ex   Ther Proc 3 - Details adjusted to seated ex for extension for comfort   PTRx Ther Proc 1 Pendulum/Codmans   PTRx Ther Proc 1 - Details practice x 3 min, clarified that relaxation and pain relief is goal - no need to push ROM/movement   PTRx Ther Proc 2 Standing Passive Shoulder Flexion   PTRx Ther Proc 3 Supported Shoulder External Rotation   PTRx Ther Proc 3 - Details HEP - next   PTRx Ther Proc 4 Wand Shoulder Flexion Supine   PTRx Ther Proc 5 sidelying PNF   Skilled Intervention focus on PROM within pain limits   Patient Response/Progress still some sharp pain at end range   Therapeutic Activity   Therapeutic Activities: dynamic activities to improve functional performance minutes (13162) 10   Therapeutic Activities Ther Act 2   Ther Act 1 Frozen shoulder ed   Ther Act 1 - Details Review: likely in frozen stage now given moderate irritability and ROM measures just slightly better but mostly the same.   Ther Act 2 ankle DF AROM (self MWM)   PTRx Ther Act 1 Sx modulation tools   PTRx Ther Act 1 - Details brief review of using TENS, pulleys, acupuncture, heat to help with sx modulation, USE OF HEAT TODAY DURING PULLEYS AND ANKLE WORK   Skilled Intervention to focus on L shoulder PROM and compensations for comfort   Patient Response/Progress good understanding of HEP   Neuromuscular Re-education   Neuromuscular re-ed of mvmt, balance, coord, kinesthetic sense, posture, proprioception minutes (40383) 15   PTRx Neuro Re-ed 1 Shoulder Scapular Retraction with Tubing   PTRx Neuro Re-ed 2 Quadruped Scapular Stabilization and Proprioception   Skilled Intervention targeting ankle stability given recent sprain, as well as time for heat to help shoulder extensibility   Patient Response/Progress tolerated with some difficulty but no LOB   Neuro Re-ed 2  leaning SL calf raises   Neuro Re-ed 2 - Details medial and lateral biases, x 10 both legs   Neuro Re-ed 3 crossover step   Neuro Re-ed 3 - Details progressed to eyes closed. Ed on proprioception x 4 min   Neuro Re-ed 1 single leg stance   Neuromuscular Re-education Neuro Re-ed 2;Neuro Re-ed 3   Neuro Re-ed 1 - Details x 5 sets of 30s   Manual Therapy   Manual Therapy 1 L shoulder oscillation/Gr I-II GH mobs x 10 minutes in supine   Skilled Intervention to encourage less L shoulder guarding.   Patient Response/Progress good tolerance   Plan   Home program PTRx   Plan for next session check PROM, sidelying scap, ER PROM - PRN likely 6 weeks to next visit   Comments   Comments Likely in frozen stage   Total Session Time   Timed Code Treatment Minutes 35   Total Treatment Time (sum of timed and untimed services) 35       PLAN  Continue therapy per current plan of care.  PRN visits monthly    Beginning/End Dates of Progress Note Reporting Period:  11/13/23 to 12/29/2023    Referring Provider:  Bong Durham

## 2024-01-08 PROBLEM — M25.512 ACUTE PAIN OF LEFT SHOULDER: Status: RESOLVED | Noted: 2023-07-28 | Resolved: 2024-01-08

## 2024-02-08 ENCOUNTER — THERAPY VISIT (OUTPATIENT)
Dept: PHYSICAL THERAPY | Facility: CLINIC | Age: 54
End: 2024-02-08
Payer: COMMERCIAL

## 2024-02-08 DIAGNOSIS — M25.512 ACUTE PAIN OF LEFT SHOULDER: Primary | ICD-10-CM

## 2024-02-08 PROBLEM — M54.42 CHRONIC LEFT-SIDED LOW BACK PAIN WITH LEFT-SIDED SCIATICA: Status: ACTIVE | Noted: 2022-11-23

## 2024-02-08 PROBLEM — G89.29 CHRONIC LEFT-SIDED LOW BACK PAIN WITH LEFT-SIDED SCIATICA: Status: RESOLVED | Noted: 2022-11-23 | Resolved: 2024-02-08

## 2024-02-08 PROBLEM — G89.29 CHRONIC LEFT-SIDED LOW BACK PAIN WITH LEFT-SIDED SCIATICA: Status: ACTIVE | Noted: 2022-11-23

## 2024-02-08 PROBLEM — M54.42 CHRONIC LEFT-SIDED LOW BACK PAIN WITH LEFT-SIDED SCIATICA: Status: RESOLVED | Noted: 2022-11-23 | Resolved: 2024-02-08

## 2024-02-08 PROCEDURE — 97140 MANUAL THERAPY 1/> REGIONS: CPT | Mod: GP | Performed by: PHYSICAL THERAPIST

## 2024-02-08 PROCEDURE — 97530 THERAPEUTIC ACTIVITIES: CPT | Mod: GP | Performed by: PHYSICAL THERAPIST

## 2024-02-08 PROCEDURE — 97110 THERAPEUTIC EXERCISES: CPT | Mod: GP | Performed by: PHYSICAL THERAPIST

## 2024-02-08 NOTE — PROGRESS NOTES
02/08/24 0500   Appointment Info   Signing clinician's name / credentials Jocelyne Infante, PT, DPT, OCS   Total/Authorized Visits 12   Visits Used 10   Medical Diagnosis L shoulder pain   PT Tx Diagnosis L shoulder pain   Other pertinent information late 10', stayed 10'   Progress Note/Certification   Onset of illness/injury or Date of Surgery 02/01/23   Therapy Frequency 2 x month   Predicted Duration 9 months per dx   Progress Note Due Date 02/26/24   Progress Note Completed Date 12/29/23   PT Goal 1   Goal Identifier Reaching   Goal Description Reach out to side and OH without pain   Rationale to maximize safety and independence with performance of ADLs and functional tasks;to maximize safety and independence within the home   Goal Progress see objective, small changes likely indicating frozen stage   Target Date 08/08/24   Subjective Report   Subjective Report Unfortunately she is feeling more pain - baseline is a 4/10 and she is trying to be conservative with her movement while still using the shoulder when she can. Not doing TENs anymore. Not taking NSAIDs. Continuing with heat regularly and HEP daily.   Objective Measures   Objective Measures Objective Measure 1;Objective Measure 3   Objective Measure 1   Objective Measure L PROM   Details ER at side 15 deg and guarded (empty end feel), abduction 70 (firm end feel), flexion 108 (empty end feel). Surface numbness but sharp pain felt at end range with all.   Objective Measure 2   Objective Measure L AROM   Details ER at side: 35 deg painfull end range, /  abduction 70 mid to end range pain, / flexion 95 mid to end range pain. Counselled for pain free ROM   Objective Measure 3   Objective Measure Sx irritability   Details high (not much to provoke sx and takes half a day for sx to come back down to baseline 4/10)   PT Modalities   PT Modalities Electrical Stimulation   Electrical Stimulation   Treatment Detail TENS (see below)   Patient Response/Progress  tolerated well; skin precautions given   Treatment Interventions (PT)   Interventions Therapeutic Procedure/Exercise;Manual Therapy   Therapeutic Procedure/Exercise   Therapeutic Procedures: strength, endurance, ROM, flexibillity minutes (71429) 10   Therapeutic Procedures Ther Proc 2;Ther Proc 3   Ther Proc 1 HEP ed   Ther Proc 1 - Details Mod sx irritability   Ther Proc 2 Pulleys   Ther Proc 2 - Details flexion, scaption, ER x 8 min   Ther Proc 3 Foam roller ex   Ther Proc 3 - Details adjusted to seated ex for extension for comfort   PTRx Ther Proc 1 Pendulum/Codmans   PTRx Ther Proc 1 - Details HEP continue   PTRx Ther Proc 2 Thoracic Extension   PTRx Ther Proc 2 - Details HEP verbal review of goal/emphasis   PTRx Ther Proc 3 Reverse Pendulum Supine or Sidelying   PTRx Ther Proc 3 - Details trialed shoulder protraction but unable to complete without increase in pain so regressed to small circles for rotator cuff activation x 10    PTRx Ther Proc 4 Isometric Shoulder External Rotation   PTRx Ther Proc 4 - Details x 3 x 5s holds - ed on keeping it PAIN FREE   PTRx Ther Proc 5 sidelying PNF   Skilled Intervention focus on PROM within pain limits   Patient Response/Progress still some sharp pain at end range   Therapeutic Activity   Therapeutic Activities: dynamic activities to improve functional performance minutes (92540) 15   Therapeutic Activities Ther Act 2   Ther Act 1 Frozen shoulder ed   Ther Act 1 - Details Review: likely in frozen stage now given moderate irritability and ROM measures just slightly better but mostly the same. - continued to encourage sx modulating tx and decreasing volume of ex   Ther Act 2 ankle DF AROM (self MWM)   PTRx Ther Act 1 Sx modulation tools   PTRx Ther Act 1 - Details brief review of using TENS, pulleys, acupuncture, heat to help with sx modulation, consider using a sling a couple hours at a time for resting shoudler   Skilled Intervention to focus on L shoulder PROM and  compensations for comfort   Patient Response/Progress good understanding of HEP   Neuromuscular Re-education   Neuromuscular Re-education Neuro Re-ed 2;Neuro Re-ed 3   Neuro Re-ed 1 single leg stance   Neuro Re-ed 2 leaning SL calf raises   Neuro Re-ed 3 crossover step   PTRx Neuro Re-ed 1 Shoulder Scapular Retraction with Tubing   PTRx Neuro Re-ed 2 Quadruped Scapular Stabilization and Proprioception   Skilled Intervention targeting ankle stability given recent sprain, as well as time for heat to help shoulder extensibility   Patient Response/Progress tolerated with some difficulty but no LOB   Manual Therapy   Manual Therapy: Mobilization, MFR, MLD, friction massage minutes (42366) 15   Manual Therapy 1 L shoulder oscillation   Skilled Intervention targeting decreased hypertonicity and sx modulation   Patient Response/Progress appreciative   Manual Therapy 2 STM shoulder   Manual Therapy 2 - Details in prone, gentle mobilization for comfort   Manual Therapy 3 gr 1 and gr 5 HVLAT x 3 at mid and low thoracic spine   Manual Therapy 3 - Details targeting pain relief, UPA and CPA mobs   Manual Therapy Manual Therapy 2;Manual Therapy 3   Plan   Home program PTRx   Plan for next session check PROM, sidelying scap, ER PROM - PRN likely 6 weeks to next visit   Comments   Comments Likely in frozen stage - high irritability. She has not made any progress with ROM and her function is likely slightly worse due to her pain level. Reached out to Dr. Durham via [employer] today to discuss palliative options.   Total Session Time   Timed Code Treatment Minutes 40   Total Treatment Time (sum of timed and untimed services) 40       PLAN  Continue POC and discuss other pain modulating options with Dr. Durham (via inbasket). PRN visits (approx monthly) for consultation of activities and ed on progression of frozen shoulder     Beginning/End Dates of Progress Note Reporting Period:  12/29/23 to 02/08/2024    Referring Provider:  Bong  Ristow

## 2024-02-12 ENCOUNTER — TELEPHONE (OUTPATIENT)
Dept: ORTHOPEDICS | Facility: CLINIC | Age: 54
End: 2024-02-12

## 2024-02-12 ENCOUNTER — OFFICE VISIT (OUTPATIENT)
Dept: ORTHOPEDICS | Facility: CLINIC | Age: 54
End: 2024-02-12
Payer: COMMERCIAL

## 2024-02-12 DIAGNOSIS — M75.02 ADHESIVE CAPSULITIS OF LEFT SHOULDER: Primary | ICD-10-CM

## 2024-02-12 PROCEDURE — 99214 OFFICE O/P EST MOD 30 MIN: CPT | Performed by: STUDENT IN AN ORGANIZED HEALTH CARE EDUCATION/TRAINING PROGRAM

## 2024-02-12 NOTE — PROGRESS NOTES
ASSESSMENT & PLAN    Karin was seen today for pain.    Diagnoses and all orders for this visit:    Adhesive capsulitis of left shoulder      This issue is chronic and Worsening. Karin today to follow-up on her chronic, worsening left shoulder pain.  Imaging findings and exam findings today are consistent with her pain and loss of range of motion being due to adhesive capsulitis, as she has limited painful active and passive range of motion with 5/5 rotator cuff strength that does not reproduce pain.  Unfortunately, she did have a steroid injection just over 3 months ago that did not provide much relief.  She has been participating in physical therapy for several months without improvement in her symptoms.  Given her symptoms have persisted and she still has painful loss of range of motion, it is reasonable to repeat a steroid injection to see if she can get any further relief.  We discussed the above findings and determined the following plan:  -Patient will return to clinic for CSI to left glenohumeral joint as well as a CSI to the left subacromial bursa  -She will continue with physical therapy as directed by her therapist      Samir Hewitt [facility] [place]    SUBJECTIVE- Interim History February 12, 2024    Chief Complaint   Patient presents with    Left Shoulder - Pain       Karin Santacruz is a 54 year old female who is seen in f/u up for left shoulder. Since last visit on 11/13/2023 patient has been having pain for over a year and is experiencing more pain within the past few months. Patient states the last injection did not work and she has been experiencing more pain, trouble sleeping and laying on her back, she has decreasing ROM with physical therapy. Patient wants to discuss different options she can utilize to alleviate the pain.     Patient was originally seeing Dr. Durham and sent to Dr. Hewitt for a frozen shoulder injection.       REVIEW OF  SYSTEMS:  Review of Systems   All other systems reviewed and are negative.      OBJECTIVE:  LMP 01/05/2022    General: healthy, alert and in no distress  Skin: no suspicious lesions or rash.  CV: distal perfusion intact   Resp: normal respiratory effort without conversational dyspnea   Psych: normal mood and affect  Gait: NORMAL  Neuro: Normal light sensory exam of MERCEDES extremity     Shoulder Examination (focused):   Left  Right     Skin intact intact   Inspection No erythema, ecchymosis  No erythema, ecchymosis    Palpation Tenderness: None Tenderness: None   Range of Motion (active) Forward flexion:60   GH Abduction: 50  Reach behind back: pocket Forward flexion:175   GH Abduction: 90  Reach behind back: T10   Range of Motion (passive) Forward flexion:70  GH Abduction: 55  ER at side: 30  ER at 90 deg abduction: 30  IR at 90 deg abduction: 20 Forward flexion:175   GH Abduction: 90  ER at side: 70  ER at 90 deg abduction: 90  IR at 90 deg abduction: 60   Crepitus No No   Strength Internal Rotation at side: 5+  External Rotation at side: 5+  Belly Press: 5+ Internal Rotation at side: 5+  External Rotation at side: 5+  Belly Press: 5+    Special Tests Marti: NP  Neer: NP  Josué: 5+  Speed's: 5+  Cross arm: Negative  Marti: NP  Neer: NP  Josué: 5+  Speed's: 5+  Cross arm: Negative      Other Positive Tests/ Notable Findings O'asael's: NP  Bicep Load I/II: NP  Internal Impingement: NP  Yergason: NP   ERLS: NP  Hornblower: NP  Bear Hug: NP O'asael's: NP  Bicep Load I/II: NP  Internal Impingement: NP  Yergason: NP   ERLS: NP  Hornblower: NP  Bear Hug: NP   Instability Generalized laxity: no  Anterior apprehension: Negative  Load and shift (anterior): NP  Load and shift (posterior): NP Generalized laxity: no  Anterior apprehension: Negative  Load and shift (anterior): NP  Load and shift (posterior): NP   Neurologic No abnormal sensation.   Scapular Motion Normal scapular alignment bilaterally without notable  protraction/retraction, elevation/depression  No dynamic evidence of scapular dyskinesia          RADIOLOGY:  Final results and radiologist's interpretation, available in the [facility] health record.  Images were reviewed with the patient in the office today.  My personal interpretation of the performed imaging: MRI from 11/2/2023 was reviewed that shows an unremarkable rotator cuff without any evidence of large tears.  There is no cartilage loss.  There is some signs of adhesive capsulitis Including thickening of the capsule at the inferior aspect

## 2024-02-12 NOTE — LETTER
2/12/2024         RE: Karin Santacruz  [address] [place] [address]        Dear Colleague,    Thank you for referring your patient, Karin Santacruz, to the [facility]. Please see a copy of my visit note below.    ASSESSMENT & PLAN    Karin was seen today for pain.    Diagnoses and all orders for this visit:    Adhesive capsulitis of left shoulder      This issue is chronic and Worsening. Karin today to follow-up on her chronic, worsening left shoulder pain.  Imaging findings and exam findings today are consistent with her pain and loss of range of motion being due to adhesive capsulitis, as she has limited painful active and passive range of motion with 5/5 rotator cuff strength that does not reproduce pain.  Unfortunately, she did have a steroid injection just over 3 months ago that did not provide much relief.  She has been participating in physical therapy for several months without improvement in her symptoms.  Given her symptoms have persisted and she still has painful loss of range of motion, it is reasonable to repeat a steroid injection to see if she can get any further relief.  We discussed the above findings and determined the following plan:  -Patient will return to clinic for CSI to left glenohumeral joint as well as a CSI to the left subacromial bursa  -She will continue with physical therapy as directed by her therapist      Samir Hewitt,   [facility]    SUBJECTIVE- Interim History February 12, 2024    Chief Complaint   Patient presents with     Left Shoulder - Pain       Karin Santacruz is a 54 year old female who is seen in f/u up for left shoulder. Since last visit on 11/13/2023 patient has been having pain for over a year and is experiencing more pain within the past few months. Patient states the last injection did not work and she has been experiencing more pain, trouble sleeping and  laying on her back, she has decreasing ROM with physical therapy. Patient wants to discuss different options she can utilize to alleviate the pain.     Patient was originally seeing Dr. Durham and sent to Dr. Hewitt for a frozen shoulder injection.       REVIEW OF SYSTEMS:  Review of Systems   All other systems reviewed and are negative.      OBJECTIVE:  LMP 01/05/2022    General: healthy, alert and in no distress  Skin: no suspicious lesions or rash.  CV: distal perfusion intact   Resp: normal respiratory effort without conversational dyspnea   Psych: normal mood and affect  Gait: NORMAL  Neuro: Normal light sensory exam of MERCEDES extremity     Shoulder Examination (focused):   Left  Right     Skin intact intact   Inspection No erythema, ecchymosis  No erythema, ecchymosis    Palpation Tenderness: None Tenderness: None   Range of Motion (active) Forward flexion:60   GH Abduction: 50  Reach behind back: pocket Forward flexion:175   GH Abduction: 90  Reach behind back: T10   Range of Motion (passive) Forward flexion:70  GH Abduction: 55  ER at side: 30  ER at 90 deg abduction: 30  IR at 90 deg abduction: 20 Forward flexion:175   GH Abduction: 90  ER at side: 70  ER at 90 deg abduction: 90  IR at 90 deg abduction: 60   Crepitus No No   Strength Internal Rotation at side: 5+  External Rotation at side: 5+  Belly Press: 5+ Internal Rotation at side: 5+  External Rotation at side: 5+  Belly Press: 5+    Special Tests Marti: NP  Neer: NP  Josué: 5+  Speed's: 5+  Cross arm: Negative  Marti: NP  Neer: NP  Josué: 5+  Speed's: 5+  Cross arm: Negative      Other Positive Tests/ Notable Findings O'asael's: NP  Bicep Load I/II: NP  Internal Impingement: NP  Yergason: NP   ERLS: NP  Hornblower: NP  Bear Hug: NP O'asael's: NP  Bicep Load I/II: NP  Internal Impingement: NP  Yergason: NP   ERLS: NP  Hornblower: NP  Bear Hug: NP   Instability Generalized laxity: no  Anterior apprehension: Negative  Load and shift (anterior): NP  Load  and shift (posterior): NP Generalized laxity: no  Anterior apprehension: Negative  Load and shift (anterior): NP  Load and shift (posterior): NP   Neurologic No abnormal sensation.   Scapular Motion Normal scapular alignment bilaterally without notable protraction/retraction, elevation/depression  No dynamic evidence of scapular dyskinesia          RADIOLOGY:  Final results and radiologist's interpretation, available in the [facility] health record.  Images were reviewed with the patient in the office today.  My personal interpretation of the performed imaging: MRI from 11/2/2023 was reviewed that shows an unremarkable rotator cuff without any evidence of large tears.  There is no cartilage loss.  There is some signs of adhesive capsulitis Including thickening of the capsule at the inferior aspect      Again, thank you for allowing me to participate in the care of your patient.        Sincerely,        Samir Hewitt, DO

## 2024-02-12 NOTE — TELEPHONE ENCOUNTER
Made second attempt to contact patient prior to her appointment with Dr. Durham so we can cancel that appointment and schedule her with Dr. Hewitt. Patient did not answer and left another brief message asking for a callback and provided the scheduling number.    Madalyn Umanzor, ATC, LAT

## 2024-02-12 NOTE — TELEPHONE ENCOUNTER
No consent to communicate.    Huddled with other AT's and upon chart review patient should be coming into sports medicine and not orthopedic surgery. Tried to call patient to get her onto Dr. Hewitt schedule today since he did her last injection and cancel her appointment with Dr. Durham. If patient calls back please cancel her appointment with Dr. Durham and move her onto Dr. Hewitt schedule.    Madalyn Umanzor, JT, LAT

## 2024-02-15 NOTE — PROGRESS NOTES
Sports Medicine Procedure Note    Karin was seen today for follow up.    Diagnoses and all orders for this visit:    Adhesive capsulitis of left shoulder  -     Large Joint Injection/Arthocentesis: L glenohumeral joint    Subacromial bursitis of left shoulder joint  -     Large Joint Injection/Arthocentesis: L subacromial bursa        Karin Santacruz is a/an 54 year old female who is seen for Corticosteroid injection of left shoulder.   Last injection: 11/13/202    -CSI to left glenohumeral joint and subacromial bursa performed today under ultrasound guidance, see procedure note below for details  -Post-injection instructions were provided to the patient    Follow-up in 4 to 6 weeks      Post-Injection Discharge Instructions    You may shower, however avoid swimming, tub baths or hot tubs for 24 hours following your procedure  You may have a mild to moderate increase in pain for a few days following the injection.  The lidocaine (local numbing medicine) will wear off in several hours. It usually takes 3-5 days for the steroid medication to start working although it may take up to 14 days for full effect.   You may use ice packs for 10-15 minutes, 3 to 4 times a day at the injection site for comfort if needed  You may use extra strength Tylenol for pain control if necessary   If you were fasting, you may resume your normal diet and medications after the procedure  If you have diabetes, your blood sugar may be higher than normal for 10-14 days following a steroid injection. Contact your doctor who manages your diabetes if your blood sugar is significantly higher than usual    If you experience any of the following, call Sports Medicine @ [phone] or [phone]  -Fever over 100 degrees F  -Swelling, bleeding, redness, drainage, warmth at the injection site  -New or significant worsening pain        Dr. NETO Hewitt, DO CAQ  [facility]  Sports Medicine     -----    Large Joint  Injection/Arthocentesis: L subacromial bursa    Date/Time: 2/16/2024 3:06 PM    Performed by: Samir Hewitt DO  Authorized by: Samir Hewitt DO    Indications:  Pain  Needle Size:  25 G  Guidance: ultrasound    Approach:  Anterolateral  Location:  Shoulder      Site:  L subacromial bursa  Medications:  2 mL ROPivacaine 5 MG/ML; 1.5 mL lidocaine 1 %; 2 mL lidocaine 1 %; 6 mg betamethasone acet & sod phos 6 (3-3) MG/ML  Outcome:  Tolerated well, no immediate complications  Procedure discussed: discussed risks, benefits, and alternatives    Consent Given by:  Patient  Timeout: timeout called immediately prior to procedure    Prep: patient was prepped and draped in usual sterile fashion     0.5ml of 8.4% Sodium Bicarbonate solution was used to buffer the local numbing agent for today's injection.    Ultrasound was used to ensure safe and accurate needle placement and injection. Ultrasound images of the procedure were permanently stored.  Large Joint Injection/Arthocentesis: L glenohumeral joint    Date/Time: 2/16/2024 3:07 PM    Performed by: Samir Hewitt DO  Authorized by: Samir Hewitt DO    Indications:  Pain  Needle Size:  25 G  Guidance: ultrasound    Approach:  Posterior  Location:  Shoulder      Site:  L glenohumeral joint  Medications:  2 mL lidocaine 1 %; 1.5 mL lidocaine 1 %; 2 mL ROPivacaine 5 MG/ML; 40 mg triamcinolone 40 MG/ML  Outcome:  Tolerated well, no immediate complications  Procedure discussed: discussed risks, benefits, and alternatives    Consent Given by:  Patient  Timeout: timeout called immediately prior to procedure    Prep: patient was prepped and draped in usual sterile fashion     1ml of 8.4% Sodium Bicarbonate solution was used to buffer the local numbing agent for today's injection      Ultrasound was used to ensure safe and accurate needle placement and injection. Ultrasound images of the procedure were permanently stored.

## 2024-02-16 ENCOUNTER — OFFICE VISIT (OUTPATIENT)
Dept: ORTHOPEDICS | Facility: CLINIC | Age: 54
End: 2024-02-16
Payer: COMMERCIAL

## 2024-02-16 VITALS — HEIGHT: 65 IN | BODY MASS INDEX: 31.32 KG/M2 | WEIGHT: 188 LBS

## 2024-02-16 DIAGNOSIS — M75.52 SUBACROMIAL BURSITIS OF LEFT SHOULDER JOINT: ICD-10-CM

## 2024-02-16 DIAGNOSIS — M75.02 ADHESIVE CAPSULITIS OF LEFT SHOULDER: Primary | ICD-10-CM

## 2024-02-16 PROCEDURE — 20611 DRAIN/INJ JOINT/BURSA W/US: CPT | Mod: LT | Performed by: STUDENT IN AN ORGANIZED HEALTH CARE EDUCATION/TRAINING PROGRAM

## 2024-02-16 RX ORDER — BETAMETHASONE SODIUM PHOSPHATE AND BETAMETHASONE ACETATE 3; 3 MG/ML; MG/ML
6 INJECTION, SUSPENSION INTRA-ARTICULAR; INTRALESIONAL; INTRAMUSCULAR; SOFT TISSUE
Status: DISCONTINUED | OUTPATIENT
Start: 2024-02-16 | End: 2024-07-30

## 2024-02-16 RX ORDER — ROPIVACAINE HYDROCHLORIDE 5 MG/ML
2 INJECTION, SOLUTION EPIDURAL; INFILTRATION; PERINEURAL
Status: DISCONTINUED | OUTPATIENT
Start: 2024-02-16 | End: 2024-07-30

## 2024-02-16 RX ORDER — TRIAMCINOLONE ACETONIDE 40 MG/ML
40 INJECTION, SUSPENSION INTRA-ARTICULAR; INTRAMUSCULAR
Status: DISCONTINUED | OUTPATIENT
Start: 2024-02-16 | End: 2024-07-30

## 2024-02-16 RX ORDER — LIDOCAINE HYDROCHLORIDE 10 MG/ML
2 INJECTION, SOLUTION INFILTRATION; PERINEURAL
Status: DISCONTINUED | OUTPATIENT
Start: 2024-02-16 | End: 2024-07-30

## 2024-02-16 RX ORDER — LIDOCAINE HYDROCHLORIDE 10 MG/ML
1.5 INJECTION, SOLUTION INFILTRATION; PERINEURAL
Status: DISCONTINUED | OUTPATIENT
Start: 2024-02-16 | End: 2024-07-30

## 2024-02-16 RX ADMIN — TRIAMCINOLONE ACETONIDE 40 MG: 40 INJECTION, SUSPENSION INTRA-ARTICULAR; INTRAMUSCULAR at 15:07

## 2024-02-16 RX ADMIN — LIDOCAINE HYDROCHLORIDE 1.5 ML: 10 INJECTION, SOLUTION INFILTRATION; PERINEURAL at 15:07

## 2024-02-16 RX ADMIN — ROPIVACAINE HYDROCHLORIDE 2 ML: 5 INJECTION, SOLUTION EPIDURAL; INFILTRATION; PERINEURAL at 15:06

## 2024-02-16 RX ADMIN — BETAMETHASONE SODIUM PHOSPHATE AND BETAMETHASONE ACETATE 6 MG: 3; 3 INJECTION, SUSPENSION INTRA-ARTICULAR; INTRALESIONAL; INTRAMUSCULAR; SOFT TISSUE at 15:06

## 2024-02-16 RX ADMIN — LIDOCAINE HYDROCHLORIDE 2 ML: 10 INJECTION, SOLUTION INFILTRATION; PERINEURAL at 15:06

## 2024-02-16 RX ADMIN — LIDOCAINE HYDROCHLORIDE 2 ML: 10 INJECTION, SOLUTION INFILTRATION; PERINEURAL at 15:07

## 2024-02-16 RX ADMIN — LIDOCAINE HYDROCHLORIDE 1.5 ML: 10 INJECTION, SOLUTION INFILTRATION; PERINEURAL at 15:06

## 2024-02-16 RX ADMIN — ROPIVACAINE HYDROCHLORIDE 2 ML: 5 INJECTION, SOLUTION EPIDURAL; INFILTRATION; PERINEURAL at 15:07

## 2024-02-16 NOTE — LETTER
2/16/2024         RE: Karin Santacruz  [address] [place] [address]        Dear Colleague,    Thank you for referring your patient, Karin Santacruz, to the [facility]ICINE CLINIC [facility]. Please see a copy of my visit note below.    Sports Medicine Procedure Note    Karin was seen today for follow up.    Diagnoses and all orders for this visit:    Adhesive capsulitis of left shoulder  -     Large Joint Injection/Arthocentesis: L glenohumeral joint    Subacromial bursitis of left shoulder joint  -     Large Joint Injection/Arthocentesis: L subacromial bursa        Karin Santacruz is a/an 54 year old female who is seen for Corticosteroid injection of left shoulder.   Last injection: 11/13/202    -CSI to left glenohumeral joint and subacromial bursa performed today under ultrasound guidance, see procedure note below for details  -Post-injection instructions were provided to the patient    Follow-up in 4 to 6 weeks      Post-Injection Discharge Instructions    You may shower, however avoid swimming, tub baths or hot tubs for 24 hours following your procedure  You may have a mild to moderate increase in pain for a few days following the injection.  The lidocaine (local numbing medicine) will wear off in several hours. It usually takes 3-5 days for the steroid medication to start working although it may take up to 14 days for full effect.   You may use ice packs for 10-15 minutes, 3 to 4 times a day at the injection site for comfort if needed  You may use extra strength Tylenol for pain control if necessary   If you were fasting, you may resume your normal diet and medications after the procedure  If you have diabetes, your blood sugar may be higher than normal for 10-14 days following a steroid injection. Contact your doctor who manages your diabetes if your blood sugar is significantly higher than usual    If you experience any of the following, call Sports Medicine @  [phone] or [phone]  -Fever over 100 degrees F  -Swelling, bleeding, redness, drainage, warmth at the injection site  -New or significant worsening pain        Dr. NETO Hewitt DO CAQ  [facility]  Sports Medicine     -----    Large Joint Injection/Arthocentesis: L subacromial bursa    Date/Time: 2/16/2024 3:06 PM    Performed by: Samir Hewitt DO  Authorized by: Samir Hewitt DO    Indications:  Pain  Needle Size:  25 G  Guidance: ultrasound    Approach:  Anterolateral  Location:  Shoulder      Site:  L subacromial bursa  Medications:  2 mL ROPivacaine 5 MG/ML; 1.5 mL lidocaine 1 %; 2 mL lidocaine 1 %; 6 mg betamethasone acet & sod phos 6 (3-3) MG/ML  Outcome:  Tolerated well, no immediate complications  Procedure discussed: discussed risks, benefits, and alternatives    Consent Given by:  Patient  Timeout: timeout called immediately prior to procedure    Prep: patient was prepped and draped in usual sterile fashion     0.5ml of 8.4% Sodium Bicarbonate solution was used to buffer the local numbing agent for today's injection.    Ultrasound was used to ensure safe and accurate needle placement and injection. Ultrasound images of the procedure were permanently stored.  Large Joint Injection/Arthocentesis: L glenohumeral joint    Date/Time: 2/16/2024 3:07 PM    Performed by: Samir Hewitt DO  Authorized by: Samir Hewitt DO    Indications:  Pain  Needle Size:  25 G  Guidance: ultrasound    Approach:  Posterior  Location:  Shoulder      Site:  L glenohumeral joint  Medications:  2 mL lidocaine 1 %; 1.5 mL lidocaine 1 %; 2 mL ROPivacaine 5 MG/ML; 40 mg triamcinolone 40 MG/ML  Outcome:  Tolerated well, no immediate complications  Procedure discussed: discussed risks, benefits, and alternatives    Consent Given by:  Patient  Timeout: timeout called immediately prior to procedure    Prep: patient was prepped and draped in usual sterile fashion     1ml of 8.4% Sodium Bicarbonate  solution was used to buffer the local numbing agent for today's injection      Ultrasound was used to ensure safe and accurate needle placement and injection. Ultrasound images of the procedure were permanently stored.            Again, thank you for allowing me to participate in the care of your patient.        Sincerely,        Samir Hewitt, DO

## 2024-03-13 PROBLEM — M25.512 ACUTE PAIN OF LEFT SHOULDER: Status: RESOLVED | Noted: 2023-07-28 | Resolved: 2024-03-13

## 2024-03-17 DIAGNOSIS — I10 BENIGN ESSENTIAL HYPERTENSION: ICD-10-CM

## 2024-03-18 RX ORDER — LISINOPRIL/HYDROCHLOROTHIAZIDE 10-12.5 MG
1 TABLET ORAL DAILY
Qty: 90 TABLET | Refills: 0 | Status: SHIPPED | OUTPATIENT
Start: 2024-03-18 | End: 2024-04-05

## 2024-04-05 ENCOUNTER — OFFICE VISIT (OUTPATIENT)
Dept: FAMILY MEDICINE | Facility: CLINIC | Age: 54
End: 2024-04-05
Attending: FAMILY MEDICINE
Payer: COMMERCIAL

## 2024-04-05 ENCOUNTER — LAB (OUTPATIENT)
Dept: LAB | Facility: CLINIC | Age: 54
End: 2024-04-05
Attending: FAMILY MEDICINE
Payer: COMMERCIAL

## 2024-04-05 VITALS
HEART RATE: 61 BPM | HEIGHT: 65 IN | SYSTOLIC BLOOD PRESSURE: 118 MMHG | DIASTOLIC BLOOD PRESSURE: 82 MMHG | BODY MASS INDEX: 31.28 KG/M2

## 2024-04-05 DIAGNOSIS — F33.42 MAJOR DEPRESSIVE DISORDER, RECURRENT EPISODE, IN FULL REMISSION (H): ICD-10-CM

## 2024-04-05 DIAGNOSIS — I10 BENIGN ESSENTIAL HYPERTENSION: ICD-10-CM

## 2024-04-05 DIAGNOSIS — I10 BENIGN ESSENTIAL HYPERTENSION: Primary | ICD-10-CM

## 2024-04-05 DIAGNOSIS — E78.5 DYSLIPIDEMIA: ICD-10-CM

## 2024-04-05 DIAGNOSIS — E66.811 OBESITY (BMI 30.0-34.9): ICD-10-CM

## 2024-04-05 PROBLEM — M75.82 TENDINITIS OF LEFT ROTATOR CUFF: Status: RESOLVED | Noted: 2023-07-28 | Resolved: 2024-04-05

## 2024-04-05 LAB
ANION GAP SERPL CALCULATED.3IONS-SCNC: 12 MMOL/L (ref 7–15)
BUN SERPL-MCNC: 12.8 MG/DL (ref 6–20)
CALCIUM SERPL-MCNC: 9.6 MG/DL (ref 8.6–10)
CHLORIDE SERPL-SCNC: 102 MMOL/L (ref 98–107)
CHOLEST SERPL-MCNC: 250 MG/DL
CREAT SERPL-MCNC: 0.7 MG/DL (ref 0.51–0.95)
DEPRECATED HCO3 PLAS-SCNC: 25 MMOL/L (ref 22–29)
EGFRCR SERPLBLD CKD-EPI 2021: >90 ML/MIN/1.73M2
FASTING STATUS PATIENT QL REPORTED: YES
GLUCOSE SERPL-MCNC: 84 MG/DL (ref 70–99)
HDLC SERPL-MCNC: 52 MG/DL
LDLC SERPL CALC-MCNC: 168 MG/DL
NONHDLC SERPL-MCNC: 198 MG/DL
POTASSIUM SERPL-SCNC: 4.3 MMOL/L (ref 3.4–5.3)
SODIUM SERPL-SCNC: 139 MMOL/L (ref 135–145)
TRIGL SERPL-MCNC: 152 MG/DL

## 2024-04-05 PROCEDURE — 80061 LIPID PANEL: CPT

## 2024-04-05 PROCEDURE — 84460 ALANINE AMINO (ALT) (SGPT): CPT

## 2024-04-05 PROCEDURE — 80048 BASIC METABOLIC PNL TOTAL CA: CPT

## 2024-04-05 PROCEDURE — 99213 OFFICE O/P EST LOW 20 MIN: CPT | Performed by: FAMILY MEDICINE

## 2024-04-05 PROCEDURE — 36415 COLL VENOUS BLD VENIPUNCTURE: CPT

## 2024-04-05 PROCEDURE — 99214 OFFICE O/P EST MOD 30 MIN: CPT | Performed by: FAMILY MEDICINE

## 2024-04-05 RX ORDER — AMPICILLIN TRIHYDRATE 250 MG
600 CAPSULE ORAL DAILY
COMMUNITY
End: 2024-07-30

## 2024-04-05 RX ORDER — LISINOPRIL/HYDROCHLOROTHIAZIDE 10-12.5 MG
1 TABLET ORAL DAILY
Qty: 90 TABLET | Refills: 4 | Status: SHIPPED | OUTPATIENT
Start: 2024-04-05

## 2024-04-05 ASSESSMENT — PAIN SCALES - GENERAL: PAINLEVEL: NO PAIN (0)

## 2024-04-05 ASSESSMENT — PATIENT HEALTH QUESTIONNAIRE - PHQ9
SUM OF ALL RESPONSES TO PHQ QUESTIONS 1-9: 8
10. IF YOU CHECKED OFF ANY PROBLEMS, HOW DIFFICULT HAVE THESE PROBLEMS MADE IT FOR YOU TO DO YOUR WORK, TAKE CARE OF THINGS AT HOME, OR GET ALONG WITH OTHER PEOPLE: SOMEWHAT DIFFICULT
SUM OF ALL RESPONSES TO PHQ QUESTIONS 1-9: 8

## 2024-04-05 NOTE — PATIENT INSTRUCTIONS
Thank you for trusting us with your care!     If you need to contact us for questions about:  Symptoms, Scheduling & Medical Questions; Non-urgent (2-3 day response) Timur message, Urgent (needing response today) [phone] (if after 3:30pm next day response)   Prescriptions: Please call your Pharmacy   Billing: [place] [phone] or [facility]:[phone]

## 2024-04-05 NOTE — PROGRESS NOTES
CC: Follow Up (Cholesterol and blood pressure)      SUBJECTIVE: Karin is a 54 year old female who comes in for follow-up.     Chronic conditions:   HTN. Long-standing diagnosis.   - Medications: Has been treating with lisinopril 10 mg daily and hydrochlorothiazide 12.5 mg daily. No adverse medication effects.   - Most recent surveillance labs reviewed today and are notable for stable creatinine, eGFR, and sodium/potassium (4/27/2023). BMP today.  - Patient denies headaches, vision changes, chest pain, cough, SOB, palpitations, LE edema, PND, or orthopnea.    Hyperlipidemia. Long-standing diagnosis.   -  Has been treating with lifestyle.   - Most recent surveillance labs reviewed today and are notable for , , HDL 39,  (4/27/2023). Follow up today.  - The 10-year ASCVD risk score (Hannah JENKINS, et al., 2019) is: 3.4%    Values used to calculate the score:      Age: 54 years      Sex: Female      Is Non- : No      Diabetic: No      Tobacco smoker: No      Systolic Blood Pressure: 118 mmHg      Is BP treated: Yes      HDL Cholesterol: 39 mg/dL      Total Cholesterol: 213 mg/dL    Depression/anxiety. Long-standing diagnosis.   - Medications: Previously treating with escitalopram 20 mg daily. Now tapered off in fall 2023. She feels like her mood is stable at baseline, aside from recent life stressors that she feels she will resolve spontaneously.     Obesity. BMI 31.   Karin is discouraged with her inability to lose weight and obsessive thoughts of food and poor self-image. She feels like she has fallen back into her disordered eating. She has done the [facility] program in the past and has set up an appointment with a therapist with specialization in eating disorders next week. She asks about the ability of GLP-1 RA medications to assist in getting rid of food noise.     Wt Readings from Last 10 Encounters:   02/16/24 85.3 kg (188 lb)   11/13/23 85.3 kg (188 lb)   09/19/23 84.8 kg  "(187 lb)   09/13/23 84.8 kg (187 lb)   09/12/23 84.8 kg (187 lb)   05/05/23 87.1 kg (192 lb)   02/01/23 89.8 kg (198 lb)   11/09/22 90.3 kg (199 lb)   08/05/22 88 kg (194 lb)   02/16/22 83.9 kg (185 lb)       OBJECTIVE:   /82   Pulse 61   Ht 1.651 m (5' 5\")   LMP 01/05/2022   BMI 31.28 kg/m    General: Alert and oriented in no acute distress.  Neuro: Grossly intact, nonfocal.  Cardio: RRR, normal S1 and S2, without murmurs, rubs, or gallops appreciated.    Resp: CTA bilaterally. Normal respiratory effort.   MSK: Distal pulses 2+ and symmetric, extremities without deformity, edema.  Psych: Mood and affect appropriate.    Answers submitted by the patient for this visit:  Patient Health Questionnaire (Submitted on 4/5/2024)  If you checked off any problems, how difficult have these problems made it for you to do your work, take care of things at home, or get along with other people?: Somewhat difficult  PHQ9 TOTAL SCORE: 8      ASSESSMENT/PLAN:   (I10) Benign essential hypertension  (primary encounter diagnosis)  -Basic metabolic panel to monitor renal function and electrolytes  -Continue lisinopril-hydrochlorothiazide (ZESTORETIC)         10-12.5 MG tablet    (E78.5) Dyslipidemia  -Lipid Profile  -Continue lifestyle management, low ASCVD risk score  -Adult Comprehensive Weight         Management  Referral    (F33.42) Major depressive disorder, recurrent episode, in full remission (H24)  Comment: Continue to monitor symptoms    (E66.9) Obesity (BMI 30.0-34.9)  -Adult Comprehensive Weight Management  Referral  -Counseled on risk/benefit of lifestyle vs medication management pertaining to weight loss. Agreed to trial weight management  first, agree with meeting with a therapist to discuss mental/emotional aspect of weight management, and will re-consider weight loss medication options in the future if she desires.     Worrisome signs and symptoms were discussed with Karin and she " was instructed to return to the clinic for concerning symptoms or to call with questions.    EDUARDA Sierra    I was present with the student who participated in the service and in the documentation of the note. I have verified the history and personally performed the physical exam and medical decision making. I agree with the assessment and plan of care as documented in the note.    Shahla Billings MD

## 2024-04-09 ENCOUNTER — TELEPHONE (OUTPATIENT)
Dept: ENDOCRINOLOGY | Facility: CLINIC | Age: 54
End: 2024-04-09
Payer: COMMERCIAL

## 2024-04-09 ENCOUNTER — MYC MEDICAL ADVICE (OUTPATIENT)
Dept: FAMILY MEDICINE | Facility: CLINIC | Age: 54
End: 2024-04-09
Payer: COMMERCIAL

## 2024-04-09 DIAGNOSIS — E78.5 DYSLIPIDEMIA: Primary | ICD-10-CM

## 2024-04-09 LAB — ALT SERPL W P-5'-P-CCNC: 13 U/L (ref 0–50)

## 2024-04-09 RX ORDER — ATORVASTATIN CALCIUM 10 MG/1
10 TABLET, FILM COATED ORAL DAILY
Qty: 90 TABLET | Refills: 0 | Status: SHIPPED | OUTPATIENT
Start: 2024-04-09 | End: 2024-05-22 | Stop reason: SINTOL

## 2024-04-09 NOTE — TELEPHONE ENCOUNTER
General Call    Contacts         Type Contact Phone/Fax    04/09/2024 08:45 AM CDT Phone (Incoming) Karin Santacruz (Self) [phone] (M)          Reason for Call:  cpt codes    What are your questions or concerns:  pt needs cpt codes for the program    Could we send this information to you in [employer] or would you prefer to receive a phone call?:   No preference   Okay to leave a detailed message?: Yes at Cell number on file:    Telephone Information:   Mobile [phone]

## 2024-05-22 ENCOUNTER — MYC MEDICAL ADVICE (OUTPATIENT)
Dept: FAMILY MEDICINE | Facility: CLINIC | Age: 54
End: 2024-05-22
Payer: COMMERCIAL

## 2024-05-22 DIAGNOSIS — E78.5 DYSLIPIDEMIA: Primary | ICD-10-CM

## 2024-05-22 DIAGNOSIS — F33.42 MAJOR DEPRESSIVE DISORDER, RECURRENT EPISODE, IN FULL REMISSION (H): ICD-10-CM

## 2024-05-22 RX ORDER — ESCITALOPRAM OXALATE 10 MG/1
TABLET ORAL
Qty: 90 TABLET | Refills: 1 | Status: SHIPPED | OUTPATIENT
Start: 2024-05-22

## 2024-05-22 RX ORDER — ROSUVASTATIN CALCIUM 5 MG/1
5 TABLET, COATED ORAL DAILY
Qty: 90 TABLET | Refills: 0 | Status: SHIPPED | OUTPATIENT
Start: 2024-05-22 | End: 2024-09-04

## 2024-05-24 ENCOUNTER — TELEPHONE (OUTPATIENT)
Dept: ENDOCRINOLOGY | Facility: CLINIC | Age: 54
End: 2024-05-24
Payer: COMMERCIAL

## 2024-05-28 ENCOUNTER — TELEPHONE (OUTPATIENT)
Dept: ENDOCRINOLOGY | Facility: CLINIC | Age: 54
End: 2024-05-28

## 2024-05-28 ENCOUNTER — VIRTUAL VISIT (OUTPATIENT)
Dept: ENDOCRINOLOGY | Facility: CLINIC | Age: 54
End: 2024-05-28
Payer: COMMERCIAL

## 2024-05-28 VITALS — WEIGHT: 196 LBS | BODY MASS INDEX: 32.65 KG/M2 | HEIGHT: 65 IN

## 2024-05-28 DIAGNOSIS — E66.811 OBESITY (BMI 30.0-34.9): ICD-10-CM

## 2024-05-28 DIAGNOSIS — E78.5 DYSLIPIDEMIA: ICD-10-CM

## 2024-05-28 PROCEDURE — 99204 OFFICE O/P NEW MOD 45 MIN: CPT | Mod: 95 | Performed by: INTERNAL MEDICINE

## 2024-05-28 PROCEDURE — G2211 COMPLEX E/M VISIT ADD ON: HCPCS | Mod: 95 | Performed by: INTERNAL MEDICINE

## 2024-05-28 ASSESSMENT — SLEEP AND FATIGUE QUESTIONNAIRES
HOW LIKELY ARE YOU TO NOD OFF OR FALL ASLEEP WHILE SITTING INACTIVE IN A PUBLIC PLACE: WOULD NEVER DOZE
HOW LIKELY ARE YOU TO NOD OFF OR FALL ASLEEP IN A CAR, WHILE STOPPED FOR A FEW MINUTES IN TRAFFIC: WOULD NEVER DOZE
HOW LIKELY ARE YOU TO NOD OFF OR FALL ASLEEP WHILE WATCHING TV: SLIGHT CHANCE OF DOZING
HOW LIKELY ARE YOU TO NOD OFF OR FALL ASLEEP WHILE SITTING QUIETLY AFTER LUNCH WITHOUT ALCOHOL: WOULD NEVER DOZE
HOW LIKELY ARE YOU TO NOD OFF OR FALL ASLEEP WHILE LYING DOWN TO REST IN THE AFTERNOON WHEN CIRCUMSTANCES PERMIT: MODERATE CHANCE OF DOZING
HOW LIKELY ARE YOU TO NOD OFF OR FALL ASLEEP WHILE SITTING AND TALKING TO SOMEONE: WOULD NEVER DOZE
HOW LIKELY ARE YOU TO NOD OFF OR FALL ASLEEP WHEN YOU ARE A PASSENGER IN A CAR FOR AN HOUR WITHOUT A BREAK: WOULD NEVER DOZE
HOW LIKELY ARE YOU TO NOD OFF OR FALL ASLEEP WHILE SITTING AND READING: SLIGHT CHANCE OF DOZING

## 2024-05-28 ASSESSMENT — PAIN SCALES - GENERAL: PAINLEVEL: NO PAIN (0)

## 2024-05-28 NOTE — NURSING NOTE
Is the patient currently in the state of [place]? YES    Visit mode:VIDEO    If the visit is dropped, the patient can be reconnected by: VIDEO VISIT: Text to cell phone:   Telephone Information:   Mobile [phone]       Will anyone else be joining the visit? NO  (If patient encounters technical issues they should call [phone])    How would you like to obtain your AVS? MyChart    Are changes needed to the allergy or medication list? Pt stated no changes to allergies and Pt stated no med changes    Are refills needed on medications prescribed by this physician? NO     Reason for visit: Consult    Wt other than 24 hrs:  two weeks ago  Pain more than one location:  no  Darline HARRELLF

## 2024-05-28 NOTE — PROGRESS NOTES
Virtual Visit Details    Type of service:  Video Visit     Originating Location (pt. Location): Home    Distant Location (provider location):  Off-site  Platform used for Video Visit: Berenice

## 2024-05-28 NOTE — PATIENT INSTRUCTIONS
Start Wegovy 0.25 mg weekly x4 weeks;  increase to 0.5 mg weekly x4 weeks;  increase to 1.0 mg weekly for 4 weeks;  increase to 1.7 mg weekly for 4 weeks;  increase to 2.4 mg weekly thereafter    See MTM PharmD in 2 month(s)  See MD or PA in 5-6 month(s)    If you have any questions, please do not hesitate to call Weight management clinic at [phone] or [phone].  If you need to fax, please fax to [phone].    Sincerely,    Parul Lane MD  Endocrinology

## 2024-05-28 NOTE — TELEPHONE ENCOUNTER
PA Initiation    Medication: WEGOVY 0.25 MG/0.5ML SC SOAJ  Insurance Company: [employer] ([facility]) - Phone [phone] Fax [phone]  Pharmacy Filling the Rx:    Filling Pharmacy Phone:    Filling Pharmacy Fax:    Start Date: 5/28/2024    [id number]

## 2024-05-28 NOTE — LETTER
"2024       RE: Karin Santacruz  [address] [place] [address]     Dear Colleague,    Thank you for referring your patient, Karin Santacruz, to the [facility] MANAGEMENT [facility] [place] at [facility]. Please see a copy of my visit note below.    Virtual Visit Details    Type of service:  Video Visit     Originating Location (pt. Location): Home    Distant Location (provider location):  Off-site  Platform used for Video Visit: [facility] Weight Management Consult    PATIENT:  Karin Santacruz  MRN:         [id number]  :         1970  ZOEY:         2024    Dear PCP,    I had the pleasure of seeing your patient, Karin Santacruz. Full intake/assessment was done to determine barriers to weight loss success and develop a treatment plan. Karin Santacruz is a 54 year old female interested in treatment of medical problems associated with excess weight. She has a height of 5' 5\", a weight of 196 lbs 0 oz, and the calculated Body mass index is 32.62 kg/m .    She has the following co-morbidities: hypertension, hyperlipidemia, depression, anxiety    Weight history: started to gain weight around s.  In : weight was 167 lbs. Went on Weight loss program ([place] weight [place]) --> Lost 25 lbs (cut down calories, frequent follow up). Gradually gained back later. Weight is up and down since then.    Previous attempts: FRANCES, Emely Lopez.    Eating habit: eat regular, crave high carb/fat food in general, compulsive eating, eat past full, mindless eating, lack of meal planning, Take-out or eat-out about 50%    Did [facility] 2 years ago -- outpatient intensive program -- maintained weight    Work with therapist on eating related issue.    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 Very Concerned   I became overweight As an Adult   The following factors have contributed to my weight gain Mental Health Issues    After Quitting Smoking    Eating Wrong Types of Food    Eating Too Much    Lack of Exercise    Stress   I have tried the following methods to lose weight Watching Portions or Calories    Exercise    Weight Watchers    Atkins-type Diet (Low Carb/High Protein)    Emely John    Slim Fast or Other Liquid Diets    Meal Replacements    Fasting   My lowest weight since age 17 was 136   My highest weight since age 17 was 198   The most weight I have ever lost was (lbs) 25   I have the following family history of obesity/being overweight One or more of my siblings are overweight   How has your weight changed over the last year? Gained   How many pounds? 10           5/28/2024     2:41 PM   Diet Recall Review with Patient   If you do eat breakfast, what types of food do you eat? mcdonalds sandwich or yogurt with fruit   If you do eat lunch, what types of food do you typically eat? salad   If you do eat supper, what types of food do you typically eat? carb, protein, veggie   How many glasses of juice do you drink in a typical day? 0   How many of glasses of milk do you drink in a typical day? 0   How many 8oz glasses of sugar containing drinks such as Richie-Aid/sweet tea do you drink in a day? 0   How many cans/bottles of sugar pop/soda/tea/sports drinks do you drink in a day? 0   How many cans/bottles of diet pop/soda/tea or sports drink do you drink in a day? 2   How often do you have a drink of " alcohol? 2-4 Times a Month   If you do drink, how many drinks might you have in a day? 1 or 2           5/28/2024     2:41 PM   Eating Habits   Generally, my meals include foods like these bread, pasta, rice, potatoes, corn, crackers, sweet dessert, pop, or juice Almost Everyday   Generally, my meals include foods like these fried meats, brats, burgers, french fries, pizza, cheese, chips, or ice cream A Few Times a Week   Eat fast food (like McDonalds, [employer], Taco Bell) A Few Times a Week   Eat at a buffet or sit-down restaurant A Few Times a Week   Eat most of my meals in front of the TV or computer A Few Times a Week   Often skip meals, eat at random times, have no regular eating times Less Than Weekly   Rarely sit down for a meal but snack or graze throughout Less Than Weekly   Eat extra snacks between meals Less Than Weekly   Eat most of my food at the end of the day Less Than Weekly   Eat in the middle of the night or wake up at night to eat Never   Eat extra snacks to prevent or correct low blood sugar Less Than Weekly   Eat to prevent acid reflux or stomach pain Never   Worry about not having enough food to eat Never   I eat when I am depressed Once a Week   I eat when I am stressed Once a Week   I eat when I am bored Once a Week   I eat when I am anxious Once a Week   I eat when I am happy or as a reward Once a Week   I feel hungry all the time even if I just have eaten Less Than Weekly   Feeling full is important to me Almost Everyday   I finish all the food on my plate even if I am already full Once a Week   I can't resist eating delicious food or walk past the good food/smell Less Than Weekly   I eat/snack without noticing that I am eating Less Than Weekly   I eat when I am preparing the meal Less Than Weekly   I eat more than usual when I see others eating Less Than Weekly   I have trouble not eating sweets, ice cream, cookies, or chips if they are around the house A Few Times a Week   I think  about food all day Almost Everyday   What foods, if any, do you crave? Sweets/Candy/Chocolate   Please list any other foods you crave? diet coke           5/28/2024     2:41 PM   Amount of Food   I feel out of control when eating Monthly   I eat a large amount of food, like a loaf of bread, a box of cookies, a pint/quart of ice cream, all at once Monthly   I eat a large amount of food even when I am not hungry Monthly   I eat rapidly Monthly   I eat alone because I feel embarrassed and do not want others to see how much I have eaten Monthly   I eat until I am uncomfortably full Monthly   I feel bad, disgusted, or guilty after I overeat Monthly           5/28/2024     2:41 PM   Activity/Exercise History   How much of a typical 12 hour day do you spend sitting? Half the Day   How much of a typical 12 hour day do you spend lying down? Less Than Half the Day   How much of a typical day do you spend walking/standing? Less Than Half the Day   How many hours (not including work) do you spend on the TV/Video Games/Computer/Tablet/Phone? 6 Hours or More   How many times a week are you active for the purpose of exercise? 6-7 Times a Week   What keeps you from being more active? Too tired   How many total minutes do you spend doing some activity for the purpose of exercising when you exercise? More Than 30 Minutes       PAST MEDICAL HISTORY:  Past Medical History:   Diagnosis Date     Benign essential hypertension 01/06/2023     Major depressive disorder, recurrent episode, in full remission (H24) 01/06/2023 5/28/2024     2:41 PM   Work/Social History Reviewed With Patient   My employment status is Full-Time   My job is    How much of your job is spent on the computer or phone? 75%   How many hours do you spend commuting to work daily? 20 minutes   What is your marital status? /In a Relationship   If in a relationship, is your significant other overweight? Yes   If you have children, are they  "overweight? No   Who do you live with? spouse   Who does the food shopping? both of us           5/28/2024     2:41 PM   Mental Health History Reviewed With Patient   Have you ever been physically or sexually abused? No   How often in the past 2 weeks have you felt little interest or pleasure in doing things? More Than Half the Days   Over the past 2 weeks how often have you felt down, depressed, or hopeless? Nearly Everyday           5/28/2024     2:41 PM   Sleep History Reviewed With Patient   How many hours do you sleep at night? 7       MEDICATIONS:   Current Outpatient Medications   Medication Sig Dispense Refill     coenzyme Q-10 capsule Take 1 capsule by mouth daily       escitalopram (LEXAPRO) 10 MG tablet Take 5 mg daily for 6 days, then increase to 10 mg daily. 90 tablet 1     ferrous sulfate 140 (45 Fe) MG TBCR CR tablet Take 140 mg by mouth daily       fish oil-omega-3 fatty acids 1000 MG capsule Take 2 g by mouth daily       lisinopril-hydrochlorothiazide (ZESTORETIC) 10-12.5 MG tablet Take 1 tablet by mouth daily 90 tablet 4     multivitamin w/minerals (THERA-VIT-M) tablet Take 1 tablet by mouth daily       red yeast rice 600 MG CAPS Take 600 mg by mouth daily       rosuvastatin (CRESTOR) 5 MG tablet Take 1 tablet (5 mg) by mouth daily 90 tablet 0     UNABLE TO FIND 100 mcg daily MEDICATION NAME: K2 as MK-7       Vitamin D3 (CHOLECALCIFEROL) 25 mcg (1000 units) tablet Take 1 tablet (1,000 Units) by mouth daily Take one tablet daily. 90 tablet 3       ALLERGIES:   Allergies   Allergen Reactions     No Known Allergies        PHYSICAL EXAM:  Ht 1.651 m (5' 5\")   Wt 88.9 kg (196 lb)   LMP 01/05/2022   BMI 32.62 kg/m      Wt Readings from Last 4 Encounters:   05/28/24 88.9 kg (196 lb)   02/16/24 85.3 kg (188 lb)   11/13/23 85.3 kg (188 lb)   09/19/23 84.8 kg (187 lb)     Respirations unlabored  Location of obesity: Mixed Obesity      ASSESSMENT/PLAN:  Karin is a patient with mature onset obesity with " significant element of familial/genetic influence and with current health consequences. She does not need aggressive weight loss plan.  Karin Santacruz eats a high carb diet, eats a high fat diet, uses food as mood management, has perception of intense hunger, and mostly eats during the evening.    Her problem is complicated by strong craving/reward pathways and poor lifestyle choices    Her ability to lose weight is impacted by lack of confidence.    PLAN:    Decrease portion sizes  Decrease eating out  Purge house of food triggers  No meal skipping  Dietician visit of education  Volumetrics eating plan  Calorie/low fat diet  Meal planning  Increase activity     Craving/Reward   Ancillary testing:  N/A.  Food Plan:  High protein/low carbohydrate.   Activity Plan:  Exercise after meals.  Supplementary:  N/A.   Medication:  The patient will begin medication in pursuit of improved medical status as influenced by body weight. She will start Wegovy 0.25 mg weekly x4 weeks;  increase to 0.5 mg weekly x4 weeks;  increase to 1.0 mg weekly for 4 weeks;  increase to 1.7 mg weekly for 4 weeks;  increase to 2.4 mg weekly thereafter    There is a mutual understanding of the goals and risks of this therapy. The patient is in agreement. She is educated on dosage regimen and possible side effects.    FOLLOW-UP:   See MTM PharmD in 2 month(s)  See MD or PA in 5-6 month(s).    Joined the call at 5/28/2024, 3:28:49 pm.  Left the call at 5/28/2024, 3:56:38 pm.  You were on the call for 27 minutes 49 seconds .    Sincerely,    Parul Lane MD

## 2024-05-28 NOTE — PROGRESS NOTES
"    New Medical Weight Management Consult    PATIENT:  Karin Santacruz  MRN:         [id number]  :         1970  ZOEY:         2024    Dear PCP,    I had the pleasure of seeing your patient, Karin Santacruz. Full intake/assessment was done to determine barriers to weight loss success and develop a treatment plan. Karin Santacruz is a 54 year old female interested in treatment of medical problems associated with excess weight. She has a height of 5' 5\", a weight of 196 lbs 0 oz, and the calculated Body mass index is 32.62 kg/m .    She has the following co-morbidities: hypertension, hyperlipidemia, depression, anxiety    Weight history: started to gain weight around s.  In : weight was 167 lbs. Went on Weight loss program ([place] weight loss) --> Lost 25 lbs (cut down calories, frequent follow up). Gradually gained back later. Weight is up and down since then.    Previous attempts: FRANCES, Emely Lopez.    Eating habit: eat regular, crave high carb/fat food in general, compulsive eating, eat past full, mindless eating, lack of meal planning, Take-out or eat-out about 50%    Did [facility] 2 years ago -- outpatient intensive program -- maintained weight    Work with therapist on eating related issue.    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 Very Concerned   I became overweight As an Adult   The following factors have contributed to my weight gain Mental " Health Issues    After Quitting Smoking    Eating Wrong Types of Food    Eating Too Much    Lack of Exercise    Stress   I have tried the following methods to lose weight Watching Portions or Calories    Exercise    Weight Watchers    Atkins-type Diet (Low Carb/High Protein)    Emely Lopez    [place] Fast or Other Liquid Diets    Meal Replacements    Fasting   My lowest weight since age 17 was 136   My highest weight since age 17 was 198   The most weight I have ever lost was (lbs) 25   I have the following family history of obesity/being overweight One or more of my siblings are overweight   How has your weight changed over the last year? Gained   How many pounds? 10           5/28/2024     2:41 PM   Diet Recall Review with Patient   If you do eat breakfast, what types of food do you eat? mcdonalds sandwich or yogurt with fruit   If you do eat lunch, what types of food do you typically eat? salad   If you do eat supper, what types of food do you typically eat? carb, protein, veggie   How many glasses of juice do you drink in a typical day? 0   How many of glasses of milk do you drink in a typical day? 0   How many 8oz glasses of sugar containing drinks such as Richie-Aid/sweet tea do you drink in a day? 0   How many cans/bottles of sugar pop/soda/tea/sports drinks do you drink in a day? 0   How many cans/bottles of diet pop/soda/tea or sports drink do you drink in a day? 2   How often do you have a drink of alcohol? 2-4 Times a Month   If you do drink, how many drinks might you have in a day? 1 or 2           5/28/2024     2:41 PM   Eating Habits   Generally, my meals include foods like these bread, pasta, rice, potatoes, corn, crackers, sweet dessert, pop, or juice Almost Everyday   Generally, my meals include foods like these fried meats, brats, burgers, french fries, pizza, cheese, chips, or ice cream A Few Times a Week   Eat fast food (like McDonalds, [employer], Taco Bell) A Few Times a Week   Eat at a buffet or  sit-down restaurant A Few Times a Week   Eat most of my meals in front of the TV or computer A Few Times a Week   Often skip meals, eat at random times, have no regular eating times Less Than Weekly   Rarely sit down for a meal but snack or graze throughout Less Than Weekly   Eat extra snacks between meals Less Than Weekly   Eat most of my food at the end of the day Less Than Weekly   Eat in the middle of the night or wake up at night to eat Never   Eat extra snacks to prevent or correct low blood sugar Less Than Weekly   Eat to prevent acid reflux or stomach pain Never   Worry about not having enough food to eat Never   I eat when I am depressed Once a Week   I eat when I am stressed Once a Week   I eat when I am bored Once a Week   I eat when I am anxious Once a Week   I eat when I am happy or as a reward Once a Week   I feel hungry all the time even if I just have eaten Less Than Weekly   Feeling full is important to me Almost Everyday   I finish all the food on my plate even if I am already full Once a Week   I can't resist eating delicious food or walk past the good food/smell Less Than Weekly   I eat/snack without noticing that I am eating Less Than Weekly   I eat when I am preparing the meal Less Than Weekly   I eat more than usual when I see others eating Less Than Weekly   I have trouble not eating sweets, ice cream, cookies, or chips if they are around the house A Few Times a Week   I think about food all day Almost Everyday   What foods, if any, do you crave? Sweets/Candy/Chocolate   Please list any other foods you crave? diet maria luisa           5/28/2024     2:41 PM   Amount of Food   I feel out of control when eating Monthly   I eat a large amount of food, like a loaf of bread, a box of cookies, a pint/quart of ice cream, all at once Monthly   I eat a large amount of food even when I am not hungry Monthly   I eat rapidly Monthly   I eat alone because I feel embarrassed and do not want others to see how much  I have eaten Monthly   I eat until I am uncomfortably full Monthly   I feel bad, disgusted, or guilty after I overeat Monthly           5/28/2024     2:41 PM   Activity/Exercise History   How much of a typical 12 hour day do you spend sitting? Half the Day   How much of a typical 12 hour day do you spend lying down? Less Than Half the Day   How much of a typical day do you spend walking/standing? Less Than Half the Day   How many hours (not including work) do you spend on the TV/Video Games/Computer/Tablet/Phone? 6 Hours or More   How many times a week are you active for the purpose of exercise? 6-7 Times a Week   What keeps you from being more active? Too tired   How many total minutes do you spend doing some activity for the purpose of exercising when you exercise? More Than 30 Minutes       PAST MEDICAL HISTORY:  Past Medical History:   Diagnosis Date    Benign essential hypertension 01/06/2023    Major depressive disorder, recurrent episode, in full remission (H24) 01/06/2023 5/28/2024     2:41 PM   Work/Social History Reviewed With Patient   My employment status is Full-Time   My job is    How much of your job is spent on the computer or phone? 75%   How many hours do you spend commuting to work daily? 20 minutes   What is your marital status? /In a Relationship   If in a relationship, is your significant other overweight? Yes   If you have children, are they overweight? No   Who do you live with? spouse   Who does the food shopping? both of us           5/28/2024     2:41 PM   Mental Health History Reviewed With Patient   Have you ever been physically or sexually abused? No   How often in the past 2 weeks have you felt little interest or pleasure in doing things? More Than Half the Days   Over the past 2 weeks how often have you felt down, depressed, or hopeless? Nearly Everyday           5/28/2024     2:41 PM   Sleep History Reviewed With Patient   How many hours do you sleep  "at night? 7       MEDICATIONS:   Current Outpatient Medications   Medication Sig Dispense Refill    coenzyme Q-10 capsule Take 1 capsule by mouth daily      escitalopram (LEXAPRO) 10 MG tablet Take 5 mg daily for 6 days, then increase to 10 mg daily. 90 tablet 1    ferrous sulfate 140 (45 Fe) MG TBCR CR tablet Take 140 mg by mouth daily      fish oil-omega-3 fatty acids 1000 MG capsule Take 2 g by mouth daily      lisinopril-hydrochlorothiazide (ZESTORETIC) 10-12.5 MG tablet Take 1 tablet by mouth daily 90 tablet 4    multivitamin w/minerals (THERA-VIT-M) tablet Take 1 tablet by mouth daily      red yeast rice 600 MG CAPS Take 600 mg by mouth daily      rosuvastatin (CRESTOR) 5 MG tablet Take 1 tablet (5 mg) by mouth daily 90 tablet 0    UNABLE TO FIND 100 mcg daily MEDICATION NAME: K2 as MK-7      Vitamin D3 (CHOLECALCIFEROL) 25 mcg (1000 units) tablet Take 1 tablet (1,000 Units) by mouth daily Take one tablet daily. 90 tablet 3       ALLERGIES:   Allergies   Allergen Reactions    No Known Allergies        PHYSICAL EXAM:  Ht 1.651 m (5' 5\")   Wt 88.9 kg (196 lb)   LMP 01/05/2022   BMI 32.62 kg/m      Wt Readings from Last 4 Encounters:   05/28/24 88.9 kg (196 lb)   02/16/24 85.3 kg (188 lb)   11/13/23 85.3 kg (188 lb)   09/19/23 84.8 kg (187 lb)     Respirations unlabored  Location of obesity: Mixed Obesity      ASSESSMENT/PLAN:  Karin is a patient with mature onset obesity with significant element of familial/genetic influence and with current health consequences. She does not need aggressive weight loss plan.  Karin Santacruz eats a high carb diet, eats a high fat diet, uses food as mood management, has perception of intense hunger, and mostly eats during the evening.    Her problem is complicated by strong craving/reward pathways and poor lifestyle choices    Her ability to lose weight is impacted by lack of confidence.    PLAN:    Decrease portion sizes  Decrease eating out  Purge house of food " triggers  No meal skipping  Dietician visit of education  Volumetrics eating plan  Calorie/low fat diet  Meal planning  Increase activity     Craving/Reward   Ancillary testing:  N/A.  Food Plan:  High protein/low carbohydrate.   Activity Plan:  Exercise after meals.  Supplementary:  N/A.   Medication:  The patient will begin medication in pursuit of improved medical status as influenced by body weight. She will start Wegovy 0.25 mg weekly x4 weeks;  increase to 0.5 mg weekly x4 weeks;  increase to 1.0 mg weekly for 4 weeks;  increase to 1.7 mg weekly for 4 weeks;  increase to 2.4 mg weekly thereafter    There is a mutual understanding of the goals and risks of this therapy. The patient is in agreement. She is educated on dosage regimen and possible side effects.    FOLLOW-UP:   See MTM PharmD in 2 month(s)  See MD or PA in 5-6 month(s).    Joined the call at 5/28/2024, 3:28:49 pm.  Left the call at 5/28/2024, 3:56:38 pm.  You were on the call for 27 minutes 49 seconds .    Sincerely,    Parul Lane MD

## 2024-05-28 NOTE — PROGRESS NOTES
"Video-Visit Details    Type of service:  Video Visit    Video Start Time: 3:30 PM   Video End Time: 4:01 PM     Originating Location (pt. Location): Home    Distant Location (provider location):  Offsite (providers home) [facility] [place]     Platform used for Video Visit: [employer]    New Weight Management Nutrition Consultation    Karin Santacruz is a 54 year old female presents today for new weight management nutrition consultation.  Patient referred by Dr. Teran on May 28, 2024.    Patient doesn't think she has dietitian visit coverage.     Patient with Co-morbidities of obesity includin/28/2024     2:41 PM   --   I have the following health issues associated with obesity High Blood Pressure    High Cholesterol   I have the following symptoms associated with obesity Depression    Back Pain    Fatigue     Anthropometrics:  Initial consult weight: 196 lb on 24   Estimated body mass index is 32.62 kg/m  as calculated from the following:    Height as of 24: 1.651 m (5' 5\").    Weight as of 24: 88.9 kg (196 lb).  Current Weight: 198 lb.     Full-time      Medications for Weight Loss:  Wegovy     NUTRITION HISTORY  Food allergies: NKFA  Food intolerances: None  Vitamin/Mineral Supplements: MVI, CoQ10, KM7, Vitamin D, Iron   Previous methods of diet modification for weight loss: Weight watchers, Emely Lopez, intermittent fasting.    RD before: [facility] for 6 weeks. Focused on more balanced meals     Key things to get her kickstarted. Having more balanced diet.     Has a strong craving for sweets.     Has a friend that is on Wegovy and has heard a lot about the medication from her.      Eats 2-3 servings of fruits and vegetables per day.     Diet recall:   Eats 3 meals a day.   Breakfast - 7:00-7:30 AM, Vinson's egg mcmuffin + diet coke OR High protein yogurt with raspberries   Lunch - 12 PM, Salad (vegetables, lettuce, vinagrette, chicken " (3oz)) + leftover pizza, leftovers, randomly put together.   Dinner - 6 PM, Protein + vegetable + carb   Snacks - skinny pop popcorn   Eats out/get take out - 3x a week.   Hydration: 2 diet cokes daily         5/28/2024     2:41 PM   Diet Recall Review with Patient   If you do eat breakfast, what types of food do you eat? mcdonalds sandwich or yogurt with fruit   If you do eat lunch, what types of food do you typically eat? salad   If you do eat supper, what types of food do you typically eat? carb, protein, veggie   How many glasses of juice do you drink in a typical day? 0   How many of glasses of milk do you drink in a typical day? 0   How many 8oz glasses of sugar containing drinks such as Richie-Aid/sweet tea do you drink in a day? 0   How many cans/bottles of sugar pop/soda/tea/sports drinks do you drink in a day? 0   How many cans/bottles of diet pop/soda/tea or sports drink do you drink in a day? 2   How often do you have a drink of alcohol? 2-4 Times a Month   If you do drink, how many drinks might you have in a day? 1 or 2           5/28/2024     2:41 PM   Eating Habits   Generally, my meals include foods like these bread, pasta, rice, potatoes, corn, crackers, sweet dessert, pop, or juice Almost Everyday   Generally, my meals include foods like these fried meats, brats, burgers, french fries, pizza, cheese, chips, or ice cream A Few Times a Week   Eat fast food (like McDonalds, Burger Nestor, Taco Bell) A Few Times a Week   Eat at a buffet or sit-down restaurant A Few Times a Week   Eat most of my meals in front of the TV or computer A Few Times a Week   Often skip meals, eat at random times, have no regular eating times Less Than Weekly   Rarely sit down for a meal but snack or graze throughout Less Than Weekly   Eat extra snacks between meals Less Than Weekly   Eat most of my food at the end of the day Less Than Weekly   Eat in the middle of the night or wake up at night to eat Never   Eat extra snacks to  prevent or correct low blood sugar Less Than Weekly   Eat to prevent acid reflux or stomach pain Never   Worry about not having enough food to eat Never   I eat when I am depressed Once a Week   I eat when I am stressed Once a Week   I eat when I am bored Once a Week   I eat when I am anxious Once a Week   I eat when I am happy or as a reward Once a Week   I feel hungry all the time even if I just have eaten Less Than Weekly   Feeling full is important to me Almost Everyday   I finish all the food on my plate even if I am already full Once a Week   I can't resist eating delicious food or walk past the good food/smell Less Than Weekly   I eat/snack without noticing that I am eating Less Than Weekly   I eat when I am preparing the meal Less Than Weekly   I eat more than usual when I see others eating Less Than Weekly   I have trouble not eating sweets, ice cream, cookies, or chips if they are around the house A Few Times a Week   I think about food all day Almost Everyday   What foods, if any, do you crave? Sweets/Candy/Chocolate   Please list any other foods you crave? diet coke           5/28/2024     2:41 PM   Amount of Food   I feel out of control when eating Monthly   I eat a large amount of food, like a loaf of bread, a box of cookies, a pint/quart of ice cream, all at once Monthly   I eat a large amount of food even when I am not hungry Monthly   I eat rapidly Monthly   I eat alone because I feel embarrassed and do not want others to see how much I have eaten Monthly   I eat until I am uncomfortably full Monthly   I feel bad, disgusted, or guilty after I overeat Monthly     Physical Activity:  Walks 1-3 miles a day.         5/28/2024     2:41 PM   Activity/Exercise History   How much of a typical 12 hour day do you spend sitting? Half the Day   How much of a typical 12 hour day do you spend lying down? Less Than Half the Day   How much of a typical day do you spend walking/standing? Less Than Half the Day   How  many hours (not including work) do you spend on the TV/Video Games/Computer/Tablet/Phone? 6 Hours or More   How many times a week are you active for the purpose of exercise? 6-7 Times a Week   What keeps you from being more active? Too tired   How many total minutes do you spend doing some activity for the purpose of exercising when you exercise? More Than 30 Minutes     Nutrition Prescription  Recommended energy/nutrient modification.    Nutrition Diagnosis  Obesity r/t long history of positive energy balance aeb BMI >30.    Nutrition Intervention  Materials/education provided on hypocaloric diet for weight loss. Discussed importance of protein and fiber in diet. Encouraged patient aim for 20-30 grams of protein per meal. Volumetric eating to help satiety level on fewer calories; portion control and healthy food choices (Plate Method and Volumetrics handouts), lower calorie snack choices, meal and snack planning tips and resources. Patient demonstrates understanding.  Co-developed goals to work towards. Provided pt with list of goals and resources below via [employer].     Expected Engagement: good    Nutrition Goals  1) Aim for 20-30 grams of protein per meal.   2) Increase fiber in diet by incorporating more fruits, vegetables, whole grains.   3) Aim for 64 oz of water daily.     Limit/avoid snacking as able. If snack is needed use lean protein and/or high-fiber foods. Examples:   - 2 cup popcorn   - 1 cup mixed berries   - 15 almonds, walnuts, cashews   - carrot/celery sticks and 2 tbsp low-fat ranch   - 1 hard boiled egg   - Part-skim mozzarella cheese stick   - Low-fat, low-sugar greek yogurt with 1/2 cup berries   - Med apple or pear   - sliced bell peppers with 1/2 cup salsa   - 1/2 cup roasted chickpeas   - sliced cucumbers with vinegar    100-Calorie Snack List: [email or web address]    Snack Recipes:  - Roasted chickpeas:  "https://www.diabetesfoodhub.org/recipes/roasted-and-spiced-chickpeas.html?home-category_id=23  - Black bean hummus with carrot and celery sticks: https://www.diabetesfoodhub.org/recipes/black-bean-hummus.html?home-category_id=23  - Chicken Satay with peanut sauce:  https://www.diabetesfoodhub.org/recipes/blueberry-almond-chicken-salad-lettuce-wraps.html?home-category_id=20  - Hummus Deviled Eggs:  https://www.diabetesfoodhub.org/recipes/hummus-deviled-eggs.html?home-category_id=1  - Lemon Raspberry Tian Seed Pudding:  https://www.diabetesfoodhub.org/recipes/lemon-raspberry-tian-seed-pudding.html?home-category_id=20  - Greek yogurt chocolate mouse: https://www.diabetesfoodhub.org/recipes/greek-yogurt-chocolate-mousse.html?home-category_id=23  - Broccoli Cheese Bites: https://www.diabetesfoodUpward Mobilityb.org/recipes/broccoli-cheese-bites.html?home-category_id=20  - Cheese dip with sliced veggies: https://www.diabetesfoodhub.org/recipes/quick-easy-cheese-dip.html?home-category_id=20    Protein Sources   [email or web address]     Quick/Easy Protein Sources:  Hard boiled eggs  Part-skim cheese sticks  Baby Bell cheese rounds  Low-fat/low-sugar Greek yogurt  Low-fat cottage cheese  Lean deli meat (chicken/turkey/ham)  Roasted chickpeas or lentils  Nuts   Turkey meat stick  Protein shake/bar  \"P3\" snack (cheese, nuts, deli meat)  Aldi's \"Protein Bread\"   \"Egglife\" egg white wrap    Tuna/salmon can/packet     Non-meat protein Ideas  Quinoa  Eggs  Dairy (Cottage cheese, low fat cheese, greek yogurt)   Nuts  Beans  Lentils  Protein pasta   Nutritional yeast  Garden of life raw meal powder  Liquid aminos  Homemade meats - a taco meat could be made with chopped cauliflower/mushroom as an example   Hummus (could do homemade if preferred)  Hemp hearts   Tofu  Seitan     Complex Carbohydrates high in protein  Quinoa  Brown Rice  Chick Pea  Buckwheat  Sweet Potatoes  Lentils  Legumes  Mar Beans  Black Beans   Farro   Kidney Beans "     Carbohydrates  [email or web address]     Mindful Eating  [email or web address]     Summary of Volumetrics Eating Plan  [email or web address]     Follow-Up: 4-6 weeks or as needed.     Time spent with patient: 31 minutes.  Batsheva Dill RD, LD

## 2024-05-29 ENCOUNTER — VIRTUAL VISIT (OUTPATIENT)
Dept: ENDOCRINOLOGY | Facility: CLINIC | Age: 54
End: 2024-05-29
Payer: COMMERCIAL

## 2024-05-29 VITALS — BODY MASS INDEX: 32.99 KG/M2 | HEIGHT: 65 IN | WEIGHT: 198 LBS

## 2024-05-29 DIAGNOSIS — Z71.3 NUTRITIONAL COUNSELING: Primary | ICD-10-CM

## 2024-05-29 DIAGNOSIS — E66.811 OBESITY (BMI 30.0-34.9): ICD-10-CM

## 2024-05-29 PROCEDURE — 99207 PR NO CHARGE LOS: CPT | Mod: 95

## 2024-05-29 PROCEDURE — 97802 MEDICAL NUTRITION INDIV IN: CPT | Mod: 95

## 2024-05-29 ASSESSMENT — PAIN SCALES - GENERAL: PAINLEVEL: NO PAIN (0)

## 2024-05-29 NOTE — NURSING NOTE
Is the patient currently in the state of [place]? YES    Visit mode:VIDEO    If the visit is dropped, the patient can be reconnected by: VIDEO VISIT: Send to e-mail at: [email or web address]    Will anyone else be joining the visit? NO  (If patient encounters technical issues they should call [phone]    How would you like to obtain your AVS? MyChart    Are changes needed to the allergy or medication list? No    Are refills needed on medications prescribed by this physician? NO    Reason for visit: Consult    Jeanine ROCHA

## 2024-05-29 NOTE — TELEPHONE ENCOUNTER
PRIOR AUTHORIZATION DENIED    Medication: WEGOVY 0.25 MG/0.5ML SC SOAJ  Insurance Company: [employer] ([facility]) - Phone [phone] Fax [phone]  Denial Date: 5/28/2024  Denial Reason(s):   Appeal Information:   Patient Notified:

## 2024-05-29 NOTE — LETTER
"2024       RE: Karin Santacruz  [address] [place] [address]     Dear Colleague,    Thank you for referring your patient, Karin Santacruz, to the [facility] [place] [facility]. Please see a copy of my visit note below.    Video-Visit Details    Type of service:  Video Visit    Video Start Time: 3:30 PM   Video End Time: 4:01 PM     Originating Location (pt. Location): Home    Distant Location (provider location):  Offsite (providers home) [facility] [place]     Platform used for Video Visit: [employer]    New Weight Management Nutrition Consultation    Karin Santacruz is a 54 year old female presents today for new weight management nutrition consultation.  Patient referred by Dr. Teran on May 28, 2024.    Patient doesn't think she has dietitian visit coverage.     Patient with Co-morbidities of obesity includin/28/2024     2:41 PM   --   I have the following health issues associated with obesity High Blood Pressure    High Cholesterol   I have the following symptoms associated with obesity Depression    Back Pain    Fatigue     Anthropometrics:  Initial consult weight: 196 lb on 24   Estimated body mass index is 32.62 kg/m  as calculated from the following:    Height as of 24: 1.651 m (5' 5\").    Weight as of 24: 88.9 kg (196 lb).  Current Weight: 198 lb.     Full-time      Medications for Weight Loss:  Wegovy     NUTRITION HISTORY  Food allergies: NKFA  Food intolerances: None  Vitamin/Mineral Supplements: MVI, CoQ10, KM7, Vitamin D, Iron   Previous methods of diet modification for weight loss: Weight watchers, Emely Lopez, intermittent fasting.    RD before: [facility] for 6 weeks. Focused on more balanced meals     Key things to get her kickstarted. Having more balanced diet.     Has a strong craving for sweets.     Has a friend that " is on Wegovy and has heard a lot about the medication from her.      Eats 2-3 servings of fruits and vegetables per day.     Diet recall:   Eats 3 meals a day.   Breakfast - 7:00-7:30 AM, Vinson's egg mcmuffin + diet coke OR High protein yogurt with raspberries   Lunch - 12 PM, Salad (vegetables, lettuce, vinagrette, chicken (3oz)) + leftover pizza, leftovers, randomly put together.   Dinner - 6 PM, Protein + vegetable + carb   Snacks - skinny pop popcorn   Eats out/get take out - 3x a week.   Hydration: 2 diet cokes daily         5/28/2024     2:41 PM   Diet Recall Review with Patient   If you do eat breakfast, what types of food do you eat? mcdonalds sandwich or yogurt with fruit   If you do eat lunch, what types of food do you typically eat? salad   If you do eat supper, what types of food do you typically eat? carb, protein, veggie   How many glasses of juice do you drink in a typical day? 0   How many of glasses of milk do you drink in a typical day? 0   How many 8oz glasses of sugar containing drinks such as Richie-Aid/sweet tea do you drink in a day? 0   How many cans/bottles of sugar pop/soda/tea/sports drinks do you drink in a day? 0   How many cans/bottles of diet pop/soda/tea or sports drink do you drink in a day? 2   How often do you have a drink of alcohol? 2-4 Times a Month   If you do drink, how many drinks might you have in a day? 1 or 2           5/28/2024     2:41 PM   Eating Habits   Generally, my meals include foods like these bread, pasta, rice, potatoes, corn, crackers, sweet dessert, pop, or juice Almost Everyday   Generally, my meals include foods like these fried meats, brats, burgers, french fries, pizza, cheese, chips, or ice cream A Few Times a Week   Eat fast food (like McDonalds, Burger Nestor, Taco Bell) A Few Times a Week   Eat at a buffet or sit-down restaurant A Few Times a Week   Eat most of my meals in front of the TV or computer A Few Times a Week   Often skip meals, eat at random  times, have no regular eating times Less Than Weekly   Rarely sit down for a meal but snack or graze throughout Less Than Weekly   Eat extra snacks between meals Less Than Weekly   Eat most of my food at the end of the day Less Than Weekly   Eat in the middle of the night or wake up at night to eat Never   Eat extra snacks to prevent or correct low blood sugar Less Than Weekly   Eat to prevent acid reflux or stomach pain Never   Worry about not having enough food to eat Never   I eat when I am depressed Once a Week   I eat when I am stressed Once a Week   I eat when I am bored Once a Week   I eat when I am anxious Once a Week   I eat when I am happy or as a reward Once a Week   I feel hungry all the time even if I just have eaten Less Than Weekly   Feeling full is important to me Almost Everyday   I finish all the food on my plate even if I am already full Once a Week   I can't resist eating delicious food or walk past the good food/smell Less Than Weekly   I eat/snack without noticing that I am eating Less Than Weekly   I eat when I am preparing the meal Less Than Weekly   I eat more than usual when I see others eating Less Than Weekly   I have trouble not eating sweets, ice cream, cookies, or chips if they are around the house A Few Times a Week   I think about food all day Almost Everyday   What foods, if any, do you crave? Sweets/Candy/Chocolate   Please list any other foods you crave? diet maria luisa           5/28/2024     2:41 PM   Amount of Food   I feel out of control when eating Monthly   I eat a large amount of food, like a loaf of bread, a box of cookies, a pint/quart of ice cream, all at once Monthly   I eat a large amount of food even when I am not hungry Monthly   I eat rapidly Monthly   I eat alone because I feel embarrassed and do not want others to see how much I have eaten Monthly   I eat until I am uncomfortably full Monthly   I feel bad, disgusted, or guilty after I overeat Monthly     Physical  Activity:  Walks 1-3 miles a day.         5/28/2024     2:41 PM   Activity/Exercise History   How much of a typical 12 hour day do you spend sitting? Half the Day   How much of a typical 12 hour day do you spend lying down? Less Than Half the Day   How much of a typical day do you spend walking/standing? Less Than Half the Day   How many hours (not including work) do you spend on the TV/Video Games/Computer/Tablet/Phone? 6 Hours or More   How many times a week are you active for the purpose of exercise? 6-7 Times a Week   What keeps you from being more active? Too tired   How many total minutes do you spend doing some activity for the purpose of exercising when you exercise? More Than 30 Minutes     Nutrition Prescription  Recommended energy/nutrient modification.    Nutrition Diagnosis  Obesity r/t long history of positive energy balance aeb BMI >30.    Nutrition Intervention  Materials/education provided on hypocaloric diet for weight loss. Discussed importance of protein and fiber in diet. Encouraged patient aim for 20-30 grams of protein per meal. Volumetric eating to help satiety level on fewer calories; portion control and healthy food choices (Plate Method and Volumetrics handouts), lower calorie snack choices, meal and snack planning tips and resources. Patient demonstrates understanding.  Co-developed goals to work towards. Provided pt with list of goals and resources below via [employer].com.     Expected Engagement: good    Nutrition Goals  1) Aim for 20-30 grams of protein per meal.   2) Increase fiber in diet by incorporating more fruits, vegetables, whole grains.   3) Aim for 64 oz of water daily.     Limit/avoid snacking as able. If snack is needed use lean protein and/or high-fiber foods. Examples:   - 2 cup popcorn   - 1 cup mixed berries   - 15 almonds, walnuts, cashews   - carrot/celery sticks and 2 tbsp low-fat ranch   - 1 hard boiled egg   - Part-skim mozzarella cheese stick   - Low-fat, low-sugar  "greek yogurt with 1/2 cup berries   - Med apple or pear   - sliced bell peppers with 1/2 cup salsa   - 1/2 cup roasted chickpeas   - sliced cucumbers with vinegar    100-Calorie Snack List: [email or web address]    Snack Recipes:  - Roasted chickpeas: https://www.diabetesfoodhub.org/recipes/roasted-and-spiced-chickpeas.html?home-category_id=23  - Black bean hummus with carrot and celery sticks: https://www.diabetesfoodhub.org/recipes/black-bean-hummus.html?home-category_id=23  - Chicken Satay with peanut sauce:  https://www.diabetesfoodhub.org/recipes/blueberry-almond-chicken-salad-lettuce-wraps.html?home-category_id=20  - Hummus Deviled Eggs:  https://www.diabetesfoodhub.org/recipes/hummus-deviled-eggs.html?home-category_id=1  - Lemon Raspberry Tian Seed Pudding:  https://www.diabetesfoodhub.org/recipes/lemon-raspberry-tian-seed-pudding.html?home-category_id=20  - Greek yogurt chocolate mouse: https://www.diabetesfoodhub.org/recipes/greek-yogurt-chocolate-mousse.html?home-category_id=23  - Broccoli Cheese Bites: https://www.diabetesfoodhub.org/recipes/broccoli-cheese-bites.html?home-category_id=20  - Cheese dip with sliced veggies: https://www.diabetesfoodhub.org/recipes/quick-easy-cheese-dip.html?home-category_id=20    Protein Sources   [email or web address]://[email or web address]     Quick/Easy Protein Sources:  Hard boiled eggs  Part-skim cheese sticks  Baby Bell cheese rounds  Low-fat/low-sugar Greek yogurt  Low-fat cottage cheese  Lean deli meat (chicken/turkey/ham)  Roasted chickpeas or lentils  Nuts   Turkey meat stick  Protein shake/bar  \"P3\" snack (cheese, nuts, deli meat)  Aldi's \"Protein Bread\"   \"Egglife\" egg white wrap    Tuna/salmon can/packet     Non-meat protein Ideas  Quinoa  Eggs  Dairy (Cottage cheese, low fat cheese, greek yogurt)   Nuts  Beans  Lentils  Protein pasta   Nutritional yeast  Garden of life raw meal powder  Liquid aminos  Homemade meats - a taco meat could be made with chopped " cauliflower/mushroom as an example   Hummus (could do homemade if preferred)  Hemp hearts   Tofu  Seitan     Complex Carbohydrates high in protein  Quinoa  Brown Rice  Chick Pea  Buckwheat  Sweet Potatoes  Lentils  Legumes  Mar Beans  Black Beans   Farro   Kidney Beans     Carbohydrates  [email or web address]     Mindful Eating  [email or web address]     Summary of Volumetrics Eating Plan  [email or web address]     Follow-Up: 4-6 weeks or as needed.     Time spent with patient: 31 minutes.  Batsheva Dill RD, LD

## 2024-05-30 ENCOUNTER — TELEPHONE (OUTPATIENT)
Dept: ENDOCRINOLOGY | Facility: CLINIC | Age: 54
End: 2024-05-30
Payer: COMMERCIAL

## 2024-05-30 DIAGNOSIS — E66.811 OBESITY (BMI 30.0-34.9): Primary | ICD-10-CM

## 2024-05-30 NOTE — TELEPHONE ENCOUNTER
Patient confirmed scheduled appointment:  Date: 07/30/2024  Time: 1230pm   Visit type: NEW MTM   Provider: Lauren Bloch,RPH  Location: virtual   Testing/imaging: n/a  Additional notes: Dr.Tasma LANDRY, 5-6 mo follow-up, 12/03/2024 845 am

## 2024-05-30 NOTE — PATIENT INSTRUCTIONS
Elier Frazier,     It was nice to meet you!    Follow-up with RD in 4-6 weeks or as needed.     Thank you,    Batsheva Dill, FAY, LD  If you would like to schedule or reschedule an appointment with the RD, please call [phone]    Nutrition Goals  1) Aim for 20-30 grams of protein per meal.   2) Increase fiber in diet by incorporating more fruits, vegetables, whole grains.   3) Aim for 64 oz of water daily.     Limit/avoid snacking as able. If snack is needed use lean protein and/or high-fiber foods. Examples:   - 2 cup popcorn   - 1 cup mixed berries   - 15 almonds, walnuts, cashews   - carrot/celery sticks and 2 tbsp low-fat ranch   - 1 hard boiled egg   - Part-skim mozzarella cheese stick   - Low-fat, low-sugar greek yogurt with 1/2 cup berries   - Med apple or pear   - sliced bell peppers with 1/2 cup salsa   - 1/2 cup roasted chickpeas   - sliced cucumbers with vinegar    100-Calorie Snack List: [email or web address]    Snack Recipes:  - Roasted chickpeas: https://www.diabetesfoodhub.org/recipes/roasted-and-spiced-chickpeas.html?home-category_id=23  - Black bean hummus with carrot and celery sticks: https://www.diabetesfoodhub.org/recipes/black-bean-hummus.html?home-category_id=23  - Chicken Satay with peanut sauce:  https://www.diabetesfoodhub.org/recipes/blueberry-almond-chicken-salad-lettuce-wraps.html?home-category_id=20  - Hummus Deviled Eggs:  https://www.diabetesfoodhub.org/recipes/hummus-deviled-eggs.html?home-category_id=1  - Lemon Raspberry Tian Seed Pudding:  https://www.diabetesfoodhub.org/recipes/lemon-raspberry-tian-seed-pudding.html?home-category_id=20  - Greek yogurt chocolate mouse: https://www.diabetesfoodhub.org/recipes/greek-yogurt-chocolate-mousse.html?home-category_id=23  - Broccoli Cheese Bites: https://www.diabetesfoodhub.org/recipes/broccoli-cheese-bites.html?home-category_id=20  - Cheese dip with sliced veggies:  "https://www.diabetesfoodhub.org/recipes/quick-easy-cheese-dip.html?home-category_id=20    Protein Sources   http://[email or web address]     Quick/Easy Protein Sources:  Hard boiled eggs  Part-skim cheese sticks  Baby Bell cheese rounds  Low-fat/low-sugar Greek yogurt  Low-fat cottage cheese  Lean deli meat (chicken/turkey/ham)  Roasted chickpeas or lentils  Nuts   Turkey meat stick  Protein shake/bar  \"P3\" snack (cheese, nuts, deli meat)  Aldi's \"Protein Bread\"   \"Egglife\" egg white wrap    Tuna/salmon can/packet     Non-meat protein Ideas  Quinoa  Eggs  Dairy (Cottage cheese, low fat cheese, greek yogurt)   Nuts  Beans  Lentils  Protein pasta   Nutritional yeast  Garden of life raw meal powder  Liquid aminos  Homemade meats - a taco meat could be made with chopped cauliflower/mushroom as an example   Hummus (could do homemade if preferred)  Hemp hearts   Tofu  Seitan     Complex Carbohydrates high in protein  Quinoa  Brown Rice  Chick Pea  Buckwheat  Sweet Potatoes  Lentils  Legumes  Mar Beans  Black Beans   Farro   Kidney Beans     Carbohydrates  [email or web address]     Mindful Eating  [email or web address]     Summary of Volumetrics Eating Plan  [email or web address]     COMPREHENSIVE WEIGHT MANAGEMENT PROGRAM  VIRTUAL SUPPORT GROUPS    At [facility], our Comprehensive Weight Management program offers on-line support groups for patients who are working on weight loss and considering, preparing for, or have had weight loss surgery.     There is no cost for this opportunity.  You are invited to attend the?Virtual Support Groups?provided by any of the following locations:    [facility] via BigCalc Teams with Bernarda Wise RN  2.   [place] via BigCalc Teams with Manny Salgado, PhD, LP  3.   [place] via Big[employer]c Teams with Adriana Badillo RN  4.   [facility] via a Zoom Meeting with ELIA Friedman-    The following Support Group information can also be " "found on our website:  https://www.[email or web address].org/treatments/weight-loss-and-weight-loss-surgery-support-groups      [facility] Weight Loss Surgery Support Group  The support group is a patient-lead forum that meets monthly to share experiences, encouragement and education. It is open to those who have had weight loss surgery, are scheduled for surgery, or are considering surgery.   WHEN: This group meets on the 3rd Wednesday of each month from 5:00PM - 6:00PM virtually using Microsoft Teams.   FACILITATOR: Led by Bernarda Allan RD, LD, RN, the program's Clinical Coordinator.   TO REGISTER: Please contact the clinic via [email or web address] or call the nurse line directly at [phone] to inform our staff that you would like an invite sent to you and to let us know the email you would like the invite sent to. Prior to the meeting, a link with directions on how to join the meeting will be sent to you.    2024 Meetings   January 17  February 21  March 20  April 17  May 15  Nichole 19      [facility] Bariatric Care Support Group?  This is open to all pre- and post- operative bariatric surgery patients as well as their support system.   WHEN: This group meets the 3rd Tuesday of each month from 6:30 PM - 8:00 PM virtually using Microsoft Teams.   FACILITATOR: Led by Manny Salgado, Ph.D who is a Licensed Psychologist with the [facility] Comprehensive Weight Management Program.   TO REGISTER: Please send an email to Manny Salgado, Ph.D., LP at?[email or web address]?if you would like an invitation to the group. Prior to the meeting, a link with directions on how to join the meeting will be sent to you.    2024 Meetings January 16: \"Medication Management and Bariatric Surgery\", Anastasia Do, PharmD, Pharmacy Resident at [facility]  February 20: \"A Bariatric Surgery Patient's Perspective\", RAMU Elena, [facility] Care[facility], " "Behavioral Health Clinician at [facility]  March 19  April 16  May 21  Nichole 18: \"Nutritional Labeling\", Dietitian speaker to be announced.  November 19: \"Holiday Eating\", Dietitian speaker to be announced.    [facility] Post-Operative Bariatric Surgery Support Group  This is a support group for [facility] bariatric patients (and those external to [facility]) who have had bariatric surgery and are at least 3 months post-surgery.  WHEN: This support group meets the 4th Wednesday of the month from 11:00 AM - 12:00 PM virtually using Microsoft Teams.   FACILITATOR: Led by Certified Bariatric Nurse, Adriana Badillo RN.   TO REGISTER: Please send an email to Adriana at [email or web address] if you would like an invitation to the group.  Prior to the meeting, a link with directions on how to join the meeting will be sent to you.    2024 Meetings  January 24  February 28  March 27  April 24  May 22  Nichole 26    [facility] Healthy Lifestyle Group?  This is a 60 minute virtual coaching group for those who want to lead a healthier lifestyle. Come together to set goals and overcome barriers in a supportive group environment. We will address the four pillars of health: nutrition, exercise, sleep and emotional well-being.  This group is highly recommended for those who are participating in the 24 week Healthy Lifestyle Plan and our Health Coaching sessions.  WHEN: This group meets the 1st Friday of the month, 12:30 PM - 1:30 PM online, via a zoom meeting.    FACILITATOR: Led by National Board Certified Health and , Adriana Bergeron Count includes the [facility].   TO REGISTER: Please call the Call Center at [phone] to register. You will get an appointment to attend in [place]. Fifteen minutes prior to the meeting, complete the e-check in and you will get the link to join the meeting.    There is no " "charge to attend this group and space is limited.     2024 Meetings  January 5: \"New Years Vision: Manifest your Best 2024!\" (guided imagery,  journaling and discussion)  February 2: \"Let's Talk\"  March 1: \"10 Percent Happier\" by Fidel Licona (Book Bites - a guided discussion on the nuggets of wisdom from favorite wellness books, no need to read the book but highly encouraged)  April 5: \"Let's Talk\"  May 3: \"Essentialism: The Disciplined Pursuit of Less\" by Homero Jefferson (Book Bites discussion)  June 7: \"Let's Talk\"  July 5: NO MEETING, off for the 4th of July Holiday  August 2: \"The Blue Zones, Secrets for Living a Longer Life\" by Fidel Espinoza (Book Bites discussion)        "

## 2024-06-05 ENCOUNTER — ANCILLARY PROCEDURE (OUTPATIENT)
Dept: MAMMOGRAPHY | Facility: CLINIC | Age: 54
End: 2024-06-05
Attending: FAMILY MEDICINE
Payer: COMMERCIAL

## 2024-06-05 DIAGNOSIS — Z12.31 VISIT FOR SCREENING MAMMOGRAM: ICD-10-CM

## 2024-06-05 PROCEDURE — 77067 SCR MAMMO BI INCL CAD: CPT | Performed by: RADIOLOGY

## 2024-06-05 PROCEDURE — 77063 BREAST TOMOSYNTHESIS BI: CPT | Performed by: RADIOLOGY

## 2024-06-23 ENCOUNTER — HEALTH MAINTENANCE LETTER (OUTPATIENT)
Age: 54
End: 2024-06-23

## 2024-07-29 DIAGNOSIS — E66.811 OBESITY (BMI 30.0-34.9): ICD-10-CM

## 2024-07-30 ENCOUNTER — VIRTUAL VISIT (OUTPATIENT)
Dept: CARDIOLOGY | Facility: CLINIC | Age: 54
End: 2024-07-30
Attending: INTERNAL MEDICINE
Payer: COMMERCIAL

## 2024-07-30 VITALS — HEIGHT: 65 IN | BODY MASS INDEX: 31.49 KG/M2 | WEIGHT: 189 LBS

## 2024-07-30 DIAGNOSIS — E66.811 OBESITY (BMI 30.0-34.9): ICD-10-CM

## 2024-07-30 DIAGNOSIS — E78.5 HYPERLIPIDEMIA LDL GOAL <130: ICD-10-CM

## 2024-07-30 DIAGNOSIS — F33.42 MAJOR DEPRESSIVE DISORDER, RECURRENT EPISODE, IN FULL REMISSION (H): ICD-10-CM

## 2024-07-30 DIAGNOSIS — F41.9 ANXIETY: ICD-10-CM

## 2024-07-30 DIAGNOSIS — Z78.9 TAKES DIETARY SUPPLEMENTS: Primary | ICD-10-CM

## 2024-07-30 DIAGNOSIS — D50.9 IRON DEFICIENCY ANEMIA, UNSPECIFIED IRON DEFICIENCY ANEMIA TYPE: ICD-10-CM

## 2024-07-30 DIAGNOSIS — I10 BENIGN ESSENTIAL HYPERTENSION: ICD-10-CM

## 2024-07-30 ASSESSMENT — PAIN SCALES - GENERAL: PAINLEVEL: NO PAIN (0)

## 2024-07-30 NOTE — LETTER
7/30/2024      RE: Karin Santacruz  [address] [place] [address]       Dear Colleague,    Thank you for the opportunity to participate in the care of your patient, Karin Santacruz, at the [facility] [place] at [facility]. Please see a copy of my visit note below.    Medication Therapy Management (MTM) Encounter    ASSESSMENT:                            Medication Adherence/Access: No issues identified    Supplements   Would benefit from stopping red yeast rice, K2 and fish oil as no indication.     Weight Management:   Completed teaching of administration of compound semaglutide multi use vials. Discussed mechanism of action, side effects, warnings, and efficacy. Discussed appropriate storage and stability.     Hypertension   Stable. Last blood pressure at goal < 140/90 mmHg.     Hyperlipidemia   No indication for red yeast rice, can discontinue from hyperlipidemia perspective. Would benefit from lipid panel in the future to assess rosuvastatin efficacy. Patient can continue co-enzyme Q10 should she wish for prevention of any statin side effects.     Iron Deficiency Anemia:   Due for Hgb in future.     Depression/Anxiety:    Stable.   PLAN:                            You will transition to compound semaglutide multi use vials - increase compound semaglutide to 1 mg ( 20 units) once weekly  Information will be sent via [employer] about administration  Referral placed for Get Moving Program  Pharmacist will send info on Strength training maria del rosario   Consider stopping red yeast rice, K2/MK 7, fish oil.    Complete lipid and Hgb labs in future.     Follow-up: Return in about 8 weeks (around 9/24/2024) for Medication Therapy Management Pharmacist Visit, Call [phone] to schedule.    SUBJECTIVE/OBJECTIVE:                          Karin Santacruz is a 54 year old female seen for an initial visit. She was referred to me from Dr. Parul Lane.       Reason for visit: comprehensive review of medications, compound semaglutide check in.    Allergies/ADRs: Reviewed in chart  Past Medical History: Reviewed in chart  Tobacco: She reports that she has quit smoking. She has never used smokeless tobacco.  Alcohol: 1-2 beverages/week  Caffeine: 2 diet soda cans/day    Medication Adherence/Access:   Patient uses pill box(es).  Patient takes medications 2 time(s) per day, bedtime and AM.    Per patient, misses medication 1 time per month.   Gets compound semaglutide through [facility].     Supplements  Red yeast rice daily   Coenzyme q10 daily   K2 and MK7 daily   Multivitamin daily   Vitamin D daily   Fish oil daily     No reported issues at this time. Wants to know if she should continue to take these supplements. Started taking red yeast rice for cholesterol. K2 and MK7 on her own account, can't remember why. Fish oil for eye and heart health. No personal significant eye and heart health but reports started this on her own account.        Hemoglobin   Date Value Ref Range Status   08/05/2022 12.7 11.7 - 15.7 g/dL Final     Weight Management  Compound semaglutide 0.5 mg once weekly Tuesdays     Following with Dr. Parul Lane [facility]. Patient reports the following medication side effects: fatigue, but in the last 4-5 days feeling less fatigue and back to normal. She is hoping to increase to the further dose. Adriana Program completed 2 years ago, reports diagnosed with generalized disordered eating.   Nutrition/Eating Habits: she reports significantly less food noise since starting the medication, relieved by this food noise reduction. Less hungry than baseline. Eating more frequently smaller portions, 3-4 small meals per day. Protein with every meal. Wanting to work on water intake, ~40 oz/day. Snack: popcorn.   Exercise/Activity: 11,000 steps per day, brisk walk. Enjoys walking. Has been trying to do more  "strength training.     Initial Consult Weight: 196 lb     Current weight today: 189 lbs 0 oz  Cumulative Weight Loss: -7 lb, -3.6% from baseline    Wt Readings from Last 4 Encounters:   07/30/24 85.7 kg (189 lb)   05/29/24 89.8 kg (198 lb)   05/28/24 88.9 kg (196 lb)   02/16/24 85.3 kg (188 lb)     Estimated body mass index is 31.45 kg/m  as calculated from the following:    Height as of this encounter: 1.651 m (5' 5\").    Weight as of this encounter: 85.7 kg (189 lb).    Hypertension  Lisinopril-hydrochlorothiazide 10-12.5 mg daily bedtime      Patient reports no current medication side effects. She does denote some urination at bedtime and okay with this, wants to continue at bedtime.   Patient does not self-monitor blood pressure.         Hyperlipidemia  Rosuvastatin 5mg daily    Patient reports no significant myalgias or other side effects.       Iron Deficiency Anemia:   Slow release iron 140 mg daily    No medication side effects. Has been on longstanding for anemia. Switched to slow release iron to tolerate better.     Depression/Anxiety:    Escitalopram 10mg once daily.     Has been on and off over the years. No medication side effects. Reports that she feels better on the escitalopram.       7/21/2021     1:16 PM 2/1/2023     8:11 AM 4/5/2024     1:04 PM   PHQ   PHQ-9 Total Score 1 0 8   Q9: Thoughts of better off dead/self-harm past 2 weeks Not at all Not at all Not at all         5/13/2020     9:00 AM 7/21/2021     1:16 PM 3/11/2022     8:43 AM   GUNNER-7 SCORE   Total Score 0 0 0     Today's Vitals: Ht 1.651 m (5' 5\")   Wt 85.7 kg (189 lb)   LMP 01/05/2022   BMI 31.45 kg/m    ----------------      I spent 45 minutes with this patient today. All changes were made via collaborative practice agreement with Dr. Parul Lane. A copy of the visit note was provided to the patient's provider(s).    A summary of these recommendations was sent via VISUALPLANT.    Lauren Bloch, PharmD, BCACP   Medication " Therapy Management Pharmacist   [facility]    Telemedicine Visit Details  Type of service:  Video Conference via AmWell  Start Time:  12:30 PM  End Time:  1:15 PM     Medication Therapy Recommendations  Obesity (BMI 30.0-34.9)    Current Medication: COMPOUNDED NON-CONTROLLED SUBSTANCE (CMPD RX) - PHARMACY TO MIX COMPOUNDED MEDICATION (Discontinued)   Rationale: Dose too low - Dosage too low - Effectiveness   Recommendation: Increase Dose - COMPOUNDED NON-CONTROLLED SUBSTANCE - PHARMACY TO MIX COMPOUNDED MEDICATION   Status: Accepted per CPA         Takes dietary supplements    Current Medication: red yeast rice 600 MG CAPS (Discontinued)   Rationale: No medical indication at this time - Unnecessary medication therapy - Indication   Recommendation: Discontinue Medication - k2, fish oil   Status: Accepted - no CPA Needed            Please do not hesitate to contact me if you have any questions/concerns.     Sincerely,   Lauren T. Bloch, [facility]

## 2024-07-30 NOTE — NURSING NOTE
Current patient location:  at work    Is the patient currently in the state of [place]? YES    Visit mode:VIDEO    If the visit is dropped, the patient can be reconnected by: VIDEO VISIT: Text to cell phone:   Telephone Information:   Mobile [phone]    and VIDEO VISIT: Send to e-mail at: [email or web address]    Will anyone else be joining the visit? NO  (If patient encounters technical issues they should call [phone]    How would you like to obtain your AVS? MyChart    Are changes needed to the allergy or medication list? No    Are refills needed on medications prescribed by this physician? NO    Reason for visit: Consult    Jeanine ROCHA

## 2024-07-30 NOTE — PATIENT INSTRUCTIONS
"Recommendations from today's MT visit:                                                       You will transition to compound semaglutide multi use vials - increase compound semaglutide to 1 mg ( 20 units) once weekly  Information will be sent via [employer] about administration  Referral placed for Get Moving Program: [facility] will call you to coordinate your care as prescribed by your provider. If you don't hear from a representative within 2 business days, please call [phone].   Pharmacist will send info on Strength training maria del rosario (below)  Consider stopping red yeast rice, K2/MK 7, fish oil.    Complete lipid and Hgb labs in future.     Follow-up: Return in about 8 weeks (around 9/24/2024) for Medication Therapy Management Pharmacist Visit, Call [phone] to schedule.    It was great speaking with you today.  I value your experience and would be very thankful for your time in providing feedback in our clinic survey. In the next few days, you may receive an email or text message from [employer] with a link to a survey related to your  clinical pharmacist.\"     To schedule another MT appointment, please call the clinic directly or you may call the MTM scheduling line at [phone] or toll-free at [phone].     My Clinical Pharmacist's contact information:                                                      Please feel free to contact me with any questions or concerns you have.      Lauren Bloch, PharmD  Medication Therapy Management Pharmacist   [facility]    Strength Training Apps   Caliber - top choice   Fitness coaching program designed to increase strength and improve body composition   Option for free version that features full exercise library of over 500 movements   Each exercise features a demonstration video, step by step written instructions and ability to log weights and reps used (and access history at any point)  Two paid options that a " grants the above plus:   Group coaching: $19/month  Fully customized one-on-one coaching program, $200/month  [employer] - good for variety of different work outs for free  Offers free strength based training maria del rosario to be used at home or at a gym   Also has a number of body weight exercise programs as well if wanting to hold off on using weights   Also has options for yoga, pilates or general mobility exercises  [employer] - fitness, nutrition and mental health maria del rosario   Library of work out programs, tracks works outs, weights, reps, etc   Has a 6 week beginner work out program   Healthy recipe database, can generate a shopping list for planned meals   7 day free trial, then $30/month   Future - online training program staffed by real-life humans   Works with  in person, use the maria del rosario to find a  by sorting through coaching styles, coaching expertise, can change coaches if needed  Accountability from a real human   Creates exercise specific programs, great user interface where you can log your work outs and movement (weights and reps)  $199 per month     How to Inject Compounded Weight Loss Medication- Semaglutide      How it is supplied:  You will receive 1ml or 2ml vial of medicine depending on your dose. You will also be sent syringes and needles for a month supply.    Storage:   Keep your medication stored in the fridge.   The vials are to be discarded 28 days after first use or expiration date, whichever is first.     Injecting:  Remove your vial from the refrigerator and allow to reach room temperature by sitting out on a counter/ table.  Using the syringes provided with the medication vial, withdraw the prescribed amount of medication to fill the syringe for your dose.       Follow the instructions to fill the syringe with medicine:       Follow the guide below to inject your medication:           [facility]: [phone]

## 2024-07-30 NOTE — PROGRESS NOTES
Medication Therapy Management (MTM) Encounter    ASSESSMENT:                            Medication Adherence/Access: No issues identified    Supplements   Would benefit from stopping red yeast rice, K2 and fish oil as no indication.     Weight Management:   Completed teaching of administration of compound semaglutide multi use vials. Discussed mechanism of action, side effects, warnings, and efficacy. Discussed appropriate storage and stability.     Hypertension   Stable. Last blood pressure at goal < 140/90 mmHg.     Hyperlipidemia   No indication for red yeast rice, can discontinue from hyperlipidemia perspective. Would benefit from lipid panel in the future to assess rosuvastatin efficacy. Patient can continue co-enzyme Q10 should she wish for prevention of any statin side effects.     Iron Deficiency Anemia:   Due for Hgb in future.     Depression/Anxiety:    Stable.   PLAN:                            You will transition to compound semaglutide multi use vials - increase compound semaglutide to 1 mg ( 20 units) once weekly  Information will be sent via [employer] about administration  Referral placed for Get [facility] Program  Pharmacist will send info on Strength training maria del rosario   Consider stopping red yeast rice, K2/MK 7, fish oil.    Complete lipid and Hgb labs in future.     Follow-up: Return in about 8 weeks (around 9/24/2024) for Medication Therapy Management Pharmacist Visit, Call [phone] to schedule.    SUBJECTIVE/OBJECTIVE:                          Karin Santacruz is a 54 year old female seen for an initial visit. She was referred to me from Dr. Parul Lane.      Reason for visit: comprehensive review of medications, compound semaglutide check in.    Allergies/ADRs: Reviewed in chart  Past Medical History: Reviewed in chart  Tobacco: She reports that she has quit smoking. She has never used smokeless tobacco.  Alcohol: 1-2 beverages/week  Caffeine: 2 diet soda cans/day    Medication  Adherence/Access:   Patient uses pill box(es).  Patient takes medications 2 time(s) per day, bedtime and AM.    Per patient, misses medication 1 time per month.   Gets compound semaglutide through [facility] pharmacy.     Supplements   Red yeast rice daily   Coenzyme q10 daily   K2 and MK7 daily   Multivitamin daily   Vitamin D daily   Fish oil daily     No reported issues at this time. Wants to know if she should continue to take these supplements. Started taking red yeast rice for cholesterol. K2 and MK7 on her own account, can't remember why. Fish oil for eye and heart health. No personal significant eye and heart health but reports started this on her own account.        Hemoglobin   Date Value Ref Range Status   08/05/2022 12.7 11.7 - 15.7 g/dL Final     Weight Management   Compound semaglutide 0.5 mg once weekly Tuesdays     Following with Dr. [facility]. Patient reports the following medication side effects: fatigue, but in the last 4-5 days feeling less fatigue and back to normal. She is hoping to increase to the further dose. [facility] completed 2 years ago, reports diagnosed with generalized disordered eating.   Nutrition/Eating Habits: she reports significantly less food noise since starting the medication, relieved by this food noise reduction. Less hungry than baseline. Eating more frequently smaller portions, 3-4 small meals per day. Protein with every meal. Wanting to work on water intake, ~40 oz/day. Snack: popcorn.   Exercise/Activity: 11,000 steps per day, brisk walk. Enjoys walking. Has been trying to do more strength training.     Initial Consult Weight: 196 lb     Current weight today: 189 lbs 0 oz  Cumulative Weight Loss: -7 lb, -3.6% from baseline    Wt Readings from Last 4 Encounters:   07/30/24 85.7 kg (189 lb)   05/29/24 89.8 kg (198 lb)   05/28/24 88.9 kg (196 lb)   02/16/24 85.3 kg (188 lb)     Estimated body mass index is  "31.45 kg/m  as calculated from the following:    Height as of this encounter: 1.651 m (5' 5\").    Weight as of this encounter: 85.7 kg (189 lb).    Hypertension   Lisinopril-hydrochlorothiazide 10-12.5 mg daily bedtime      Patient reports no current medication side effects. She does denote some urination at bedtime and okay with this, wants to continue at bedtime.   Patient does not self-monitor blood pressure.         Hyperlipidemia   Rosuvastatin 5mg daily    Patient reports no significant myalgias or other side effects.       Iron Deficiency Anemia:   Slow release iron 140 mg daily    No medication side effects. Has been on longstanding for anemia. Switched to slow release iron to tolerate better.     Depression/Anxiety:    Escitalopram 10mg once daily.     Has been on and off over the years. No medication side effects. Reports that she feels better on the escitalopram.       7/21/2021     1:16 PM 2/1/2023     8:11 AM 4/5/2024     1:04 PM   PHQ   PHQ-9 Total Score 1 0 8   Q9: Thoughts of better off dead/self-harm past 2 weeks Not at all Not at all Not at all         5/13/2020     9:00 AM 7/21/2021     1:16 PM 3/11/2022     8:43 AM   GUNNER-7 SCORE   Total Score 0 0 0     Today's Vitals: Ht 1.651 m (5' 5\")   Wt 85.7 kg (189 lb)   LMP 01/05/2022   BMI 31.45 kg/m    ----------------      I spent 45 minutes with this patient today. All changes were made via collaborative practice agreement with Dr. Parul Lane. A copy of the visit note was provided to the patient's provider(s).    A summary of these recommendations was sent via [employer].    Lauren Bloch, PharmD, BCACP   Medication Therapy Management Pharmacist   [facility]    Telemedicine Visit Details  Type of service:  Video Conference via [employer]  Start Time:  12:30 PM  End Time:  1:15 PM     Medication Therapy Recommendations  Obesity (BMI 30.0-34.9)    Current Medication: COMPOUNDED NON-CONTROLLED SUBSTANCE " (CMPD RX) - PHARMACY TO MIX COMPOUNDED MEDICATION (Discontinued)   Rationale: Dose too low - Dosage too low - Effectiveness   Recommendation: Increase Dose - COMPOUNDED NON-CONTROLLED SUBSTANCE - PHARMACY TO MIX COMPOUNDED MEDICATION   Status: Accepted per CPA         Takes dietary supplements    Current Medication: red yeast rice 600 MG CAPS (Discontinued)   Rationale: No medical indication at this time - Unnecessary medication therapy - Indication   Recommendation: Discontinue Medication - k2, fish oil   Status: Accepted - no CPA Needed

## 2024-08-26 ENCOUNTER — THERAPY VISIT (OUTPATIENT)
Dept: PHYSICAL THERAPY | Facility: CLINIC | Age: 54
End: 2024-08-26
Attending: INTERNAL MEDICINE
Payer: COMMERCIAL

## 2024-08-26 DIAGNOSIS — E66.811 OBESITY (BMI 30.0-34.9): ICD-10-CM

## 2024-08-26 PROCEDURE — 97161 PT EVAL LOW COMPLEX 20 MIN: CPT | Mod: GP | Performed by: PHYSICAL THERAPIST

## 2024-08-26 PROCEDURE — 97112 NEUROMUSCULAR REEDUCATION: CPT | Mod: GP | Performed by: PHYSICAL THERAPIST

## 2024-08-26 PROCEDURE — 97110 THERAPEUTIC EXERCISES: CPT | Mod: GP | Performed by: PHYSICAL THERAPIST

## 2024-08-26 ASSESSMENT — ACTIVITIES OF DAILY LIVING (ADL)
PUTTING_ON_A_SHIRT_THAT_BUTTONS_DOWN_THE_FRONT: 0
PLEASE_INDICATE_YOR_PRIMARY_REASON_FOR_REFERRAL_TO_THERAPY:: SHOULDER
WASHING_YOUR_HAIR?: 7
REACHING_FOR_SOMETHING_ON_A_HIGH_SHELF: 7
WASHING_YOUR_BACK: 7
REMOVING_SOMETHING_FROM_YOUR_BACK_POCKET: 7
PUSHING_WITH_THE_INVOLVED_ARM: 3
TOUCHING_THE_BACK_OF_YOUR_NECK: 5
CARRYING_A_HEAVY_OBJECT_OF_10_POUNDS: 3
AT_ITS_WORST?: 6
PUTTING_ON_AN_UNDERSHIRT_OR_A_PULLOVER_SWEATER: 5
WHEN_LYING_ON_THE_INVOLVED_SIDE: 3
PUTTING_ON_YOUR_PANTS: 0
PLACING_AN_OBJECT_ON_A_HIGH_SHELF: 9

## 2024-08-26 NOTE — PROGRESS NOTES
PHYSICAL THERAPY EVALUATION  Type of Visit: Evaluation              Subjective Patient reports the onset of left shoulder pain and stiffness in the summer of 2022 without inciting event. Currently she reports a pain in her left shoulder that radiates down the outside of her upper arm. She also reports significant stiffness.  Patient reports reaching overhead is the most painful movement/position. She also reports reaching behind her back is challenging and painful when she dresses.     She reports having a steroid injection in the summer of 2023 which was not helpful. She also reports participating in PT in the  which was not helpful. She reports having another steroid injection the the spring of 2024 which reduced her pain, but not her stiffness.               Presenting condition or subjective complaint: Frozen shoulder  Date of onset:      Relevant medical history:     Dates & types of surgery:      Prior diagnostic imaging/testing results: MRI     Prior therapy history for the same diagnosis, illness or injury: Yes Physical therapy    Prior Level of Function  Transfers: Independent  Ambulation: Independent  ADL: Independent    Living Environment  Social support: With a significant other or spouse   Type of home: House   Stairs to enter the home: Yes 3 Is there a railing: No     Ramp: No   Stairs inside the home: Yes 14 Is there a railing: Yes     Help at home: None  Equipment owned:       Employment: Yes Trainer  Hobbies/Interests:      Patient goals for therapy: Lifting my arm up. And around my back    Pain assessment: Location: left lateral shoulder/Ratin/10     Objective   SHOULDER EVALUATION  PAIN: Pain Level with Use: 5/10  INTEGUMENTARY (edema, incisions): WNL  POSTURE: Standing Posture: Rounded shoulders, Forward head  Sitting Posture: Rounded shoulders, Forward head    ROM:  Left shoulder AROM flexion 110 deg, abduction 110 deg, ER 50 deg with arm adducted, IR back pocket . Left shoulder  PROM flexion 155 deg, abduction 150 deg, ER 60 deg with pain reported at all end ranges of motion       STRENGTH:  grossly 4+/5 throughout bilateral upper extremities. Pain reported with resisted left shoulder abduction  SPECIAL TESTS: +Marti bryan, +scapular assistance test on left. +RCT testing on left  PALPATION: WNL  JOINT MOBILITY:    Left Right   Glenohumeral anterior Hypomobile    Glenohumeral posterior Hypomobile    Glenohumeral inferior Hypomobile         Scapulothoracic Hypomobile         Scapulohumeral rhythm Hypomobile      Hypomobile thoracic spine into extension    CERVICAL SCREEN: WNL    Assessment & Plan   CLINICAL IMPRESSIONS  Medical Diagnosis:      Treatment Diagnosis:     Impression/Assessment: Patient is a 54 year old female with Left shoulder pain and stiffness complaints.  The following significant findings have been identified: Pain, Decreased ROM/flexibility, Decreased joint mobility, Decreased strength, Impaired muscle performance, Decreased activity tolerance, and Impaired posture. These impairments interfere with their ability to perform self care tasks, work tasks, recreational activities, household chores, driving , household mobility, and community mobility as compared to previous level of function.     Clinical Decision Making (Complexity):  Clinical Presentation: Stable/Uncomplicated  Clinical Presentation Rationale: based on medical and personal factors listed in PT evaluation  Clinical Decision Making (Complexity): Low complexity    PLAN OF CARE  Treatment Interventions:  Interventions: Manual Therapy, Neuromuscular Re-education, Therapeutic Activity, Therapeutic Exercise    Long Term Goals            Frequency of Treatment:    Duration of Treatment:      Recommended Referrals to Other Professionals:   Education Assessment:        Risks and benefits of evaluation/treatment have been explained.   Patient/Family/caregiver agrees with Plan of Care.     Evaluation Time:              Signing Clinician: Balaji Quinones PT

## 2024-09-03 ENCOUNTER — MYC MEDICAL ADVICE (OUTPATIENT)
Dept: FAMILY MEDICINE | Facility: CLINIC | Age: 54
End: 2024-09-03
Payer: COMMERCIAL

## 2024-09-03 ENCOUNTER — MYC MEDICAL ADVICE (OUTPATIENT)
Dept: CARDIOLOGY | Facility: CLINIC | Age: 54
End: 2024-09-03
Payer: COMMERCIAL

## 2024-09-03 DIAGNOSIS — E78.5 DYSLIPIDEMIA: ICD-10-CM

## 2024-09-04 ENCOUNTER — TELEPHONE (OUTPATIENT)
Dept: CARDIOLOGY | Facility: CLINIC | Age: 54
End: 2024-09-04
Payer: COMMERCIAL

## 2024-09-04 DIAGNOSIS — E66.811 OBESITY (BMI 30.0-34.9): ICD-10-CM

## 2024-09-04 NOTE — TELEPHONE ENCOUNTER
Called patient back.     Patient is wishing to discuss potentially going up on her compounded semaglutide.  Patient reports that she has had 4 weeks of 1 mg once weekly.  Weight is currently at 183 pounds.  She is not having any side effects.  She reports that she is doing a better job with planning her meals and being more protein fiber forward into her meals.  She is implementing more fruits and vegetables in her day.  She is working on being more consistent with her physical activity including strength training.  Due to continued efficacy and a larger financial cost to the patient to go from 1 mg to 1.5 mg discussed those discrepancies.  Patient is wishing to continue at 1 mg once weekly.  Put in refills for the compounded semaglutide into pharmacy today.    Lauren Bloch, PharmD, BCACP   Medication Therapy Management Pharmacist   [facility]

## 2024-09-04 NOTE — TELEPHONE ENCOUNTER
Writer messaged patient about need for lipid panel to be drawn to renew Rosuvastatin. Patient is requesting more medication in the meantime. Will route message to Dr. Ayoub requesting short-term refill.

## 2024-09-04 NOTE — TELEPHONE ENCOUNTER
[facility] Call Center    Phone Message    May a detailed message be left on voicemail: yes     Reason for Call: Other: Karin would like a call back to discuss medication adjustment.     Action Taken: Other: Cardiology    Travel Screening: Not Applicable     Thank you!  [facility]

## 2024-09-05 NOTE — TELEPHONE ENCOUNTER
See call back from today, 9/4/2024 for more information. Will not respond to this mychart for this reason.     Lauren Bloch, PharmD, BCACP   Medication Therapy Management Pharmacist   [facility]

## 2024-09-07 DIAGNOSIS — E78.5 DYSLIPIDEMIA: ICD-10-CM

## 2024-09-10 RX ORDER — ROSUVASTATIN CALCIUM 5 MG/1
5 TABLET, COATED ORAL DAILY
Qty: 30 TABLET | Refills: 0 | Status: SHIPPED | OUTPATIENT
Start: 2024-09-10

## 2024-09-11 RX ORDER — ROSUVASTATIN CALCIUM 5 MG/1
5 TABLET, COATED ORAL DAILY
Qty: 90 TABLET | Refills: 0 | OUTPATIENT
Start: 2024-09-11

## 2024-09-13 ENCOUNTER — LAB (OUTPATIENT)
Dept: LAB | Facility: CLINIC | Age: 54
End: 2024-09-13
Payer: COMMERCIAL

## 2024-09-13 DIAGNOSIS — E78.5 DYSLIPIDEMIA: ICD-10-CM

## 2024-09-13 LAB
ALT SERPL W P-5'-P-CCNC: 14 U/L (ref 0–50)
CHOLEST SERPL-MCNC: 197 MG/DL
FASTING STATUS PATIENT QL REPORTED: YES
HDLC SERPL-MCNC: 44 MG/DL
LDLC SERPL CALC-MCNC: 124 MG/DL
NONHDLC SERPL-MCNC: 153 MG/DL
TRIGL SERPL-MCNC: 146 MG/DL

## 2024-09-13 PROCEDURE — 36415 COLL VENOUS BLD VENIPUNCTURE: CPT

## 2024-09-13 PROCEDURE — 80061 LIPID PANEL: CPT

## 2024-09-13 PROCEDURE — 84460 ALANINE AMINO (ALT) (SGPT): CPT

## 2024-09-16 ENCOUNTER — THERAPY VISIT (OUTPATIENT)
Dept: PHYSICAL THERAPY | Facility: CLINIC | Age: 54
End: 2024-09-16
Payer: COMMERCIAL

## 2024-09-16 DIAGNOSIS — E66.811 OBESITY (BMI 30.0-34.9): ICD-10-CM

## 2024-09-16 DIAGNOSIS — M25.512 CHRONIC LEFT SHOULDER PAIN: Primary | ICD-10-CM

## 2024-09-16 DIAGNOSIS — G89.29 CHRONIC LEFT SHOULDER PAIN: Primary | ICD-10-CM

## 2024-09-16 PROCEDURE — 97110 THERAPEUTIC EXERCISES: CPT | Mod: GP | Performed by: PHYSICAL THERAPIST

## 2024-09-16 PROCEDURE — 97112 NEUROMUSCULAR REEDUCATION: CPT | Mod: GP | Performed by: PHYSICAL THERAPIST

## 2024-09-23 ENCOUNTER — THERAPY VISIT (OUTPATIENT)
Dept: PHYSICAL THERAPY | Facility: CLINIC | Age: 54
End: 2024-09-23
Payer: COMMERCIAL

## 2024-09-23 DIAGNOSIS — M25.512 CHRONIC LEFT SHOULDER PAIN: Primary | ICD-10-CM

## 2024-09-23 DIAGNOSIS — G89.29 CHRONIC LEFT SHOULDER PAIN: Primary | ICD-10-CM

## 2024-09-23 PROCEDURE — 97110 THERAPEUTIC EXERCISES: CPT | Mod: GP | Performed by: PHYSICAL THERAPIST

## 2024-09-26 ENCOUNTER — TELEPHONE (OUTPATIENT)
Dept: PHARMACY | Facility: CLINIC | Age: 54
End: 2024-09-26
Payer: COMMERCIAL

## 2024-09-26 ENCOUNTER — MYC MEDICAL ADVICE (OUTPATIENT)
Dept: CARDIOLOGY | Facility: CLINIC | Age: 54
End: 2024-09-26
Payer: COMMERCIAL

## 2024-09-26 DIAGNOSIS — E66.811 OBESITY (BMI 30.0-34.9): ICD-10-CM

## 2024-09-26 NOTE — TELEPHONE ENCOUNTER
Silvestre Light,     Patient called and would like an increase in her dose on her semaglutide medication. Thank you       Darshan Ferrer  [facility]

## 2024-09-27 NOTE — TELEPHONE ENCOUNTER
See mychart message from yesterday.      Lauren Bloch, PharmD, BCACP   Medication Therapy Management Pharmacist   [facility]

## 2024-09-27 NOTE — TELEPHONE ENCOUNTER
increase  Compound semaglutide  to 1.7 mg weekly. CPA with Dr. Parul Lane.     Lauren Bloch, PharmD, BCACP   Medication Therapy Management Pharmacist   [facility]        Signed Prescriptions:                        Disp   Refills    COMPOUNDED NON-CONTROLLED SUBSTANCE (CMPD *2 mL   2        Sig: Compounded semaglutide 1.7 mg (34 units) subcutaneous           once a week. Ok to compound during Wegovy           shortage.  Authorizing Provider: PARUL LANE  Ordering User: BLOCH, LAUREN T

## 2024-10-05 DIAGNOSIS — E78.5 DYSLIPIDEMIA: ICD-10-CM

## 2024-10-09 ENCOUNTER — MYC MEDICAL ADVICE (OUTPATIENT)
Dept: FAMILY MEDICINE | Facility: CLINIC | Age: 54
End: 2024-10-09
Payer: COMMERCIAL

## 2024-10-09 DIAGNOSIS — E78.5 DYSLIPIDEMIA: ICD-10-CM

## 2024-10-09 RX ORDER — ROSUVASTATIN CALCIUM 5 MG/1
5 TABLET, COATED ORAL DAILY
Qty: 90 TABLET | Refills: 1 | OUTPATIENT
Start: 2024-10-09

## 2024-10-09 RX ORDER — ROSUVASTATIN CALCIUM 5 MG/1
5 TABLET, COATED ORAL DAILY
Qty: 90 TABLET | Refills: 0 | Status: SHIPPED | OUTPATIENT
Start: 2024-10-09

## 2024-11-16 DIAGNOSIS — F33.42 MAJOR DEPRESSIVE DISORDER, RECURRENT EPISODE, IN FULL REMISSION (H): ICD-10-CM

## 2024-11-20 RX ORDER — ESCITALOPRAM OXALATE 10 MG/1
10 TABLET ORAL DAILY
Qty: 90 TABLET | Refills: 1 | Status: SHIPPED | OUTPATIENT
Start: 2024-11-20

## 2024-12-09 NOTE — PROGRESS NOTES
[facility] Dermatology Note  Encounter Date: Dec 10, 2024  Office Visit     Reviewed patients past medical history and pertinent chart review prior to patients visit today.     Dermatology Problem List:  1. Family history melanoma (mother).   2. History of mildly atypical nevus, left breast, 2020.     ____________________________________________    Assessment & Plan:     # Seborrheic dermatitis, scalp   - Start ketoconazole 2% shampoo three times weekly. Advised to lather in the shower, waiting 5-10 minutes before rinsing.     # Hair loss  - Discussed this may be multifactorial with seborrheic dermatitis, weight loss, and Semiglutide contributing. If the hair loss persists despite use of ketoconazole I recommend starting minoxidil 5% solution daily. We discussed potential side effects including scalp irritation.    # Ingrown toenail  - Podiatry order palaced. No sign of infection today, continue to monitor for signs of infection.     # Multiple nevi, trunk and extremities  # Solar lentigines  - The patient's lesions of concern involving the right medial thigh are consistent with nevi with reassuring features on dermoscopy.   - No concerning features on dermoscopy. We discussed the importance of self exams at home. ABCDE criteria and importance of SPF 30+ sunscreen and photoprotective clothing reviewed.     # Cherry angiomas  # Seborrheic keratoses  - We discussed the benign nature of the skin lesions. No treatment required. Continued observation recommended. Follow up with any concerns.      Follow-up:  Annual for follow up full body skin exam, as needed for new or changing lesions or new concerns    All risks, benefits and alternatives were discussed with patient.  Patient is in agreement and understands the assessment and plan.  All questions were answered.  Caprice Mathew PA-C  [facility] Dermatology    ____________________________________________    CC: Skin Check (Karin is here  today for a skin check and is concerned about moles on the right inner thigh. She states there has been an increase of hair loss. She states her toe is red and inflamed/might be in grown)    HPI:  Ms. Karin Santacruz is a(n) 54 year old female who presents today as a return patient for a full body skin cancer screening.     Concern 1:   - Pain of the left 1st toe  - Started in October  - Worsened with wearing heels    Concern 2:   - Hair thinning  - Began about 6 months ago  - Patient began semiglutide 6 months ago, has lost 25 lbs  - Eats a balanced diet  - No family history of hair loss    Concern 3:  - New moles involving the right medial thigh    No other specific cutaneous concerns today. The patient reports trying to be diligent with photoprotection.      Physical Exam:  Vitals: LMP 01/05/2022   SKIN: Total skin excluding the genitalia areas was performed. The exam included the scalp, face, neck, bilateral arms, chest, back, abdomen, bilateral legs, digits, mons pubis, buttocks, and nails.   - Pineda II.  - Greasy yellow scale with background erythema involving the temporal scalp.   - Ingrowing of the medial left 1st toenail.   - Multiple tan/brown macules and papules scattered throughout exam, consistent with benign nevi. No concerning features on dermoscopy.   - Scattered tan, homogenous macules scattered on sun exposed skin, consistent with solar lentigines.   - Scattered waxy, stuck on appearing papules and patches, consistent with seborrheic keratoses.    - Several 1-2 mm red dome shaped symmetric papules, consistent with cherry angiomas.     Medications:  Current Outpatient Medications   Medication Sig Dispense Refill    coenzyme Q-10 capsule Take 1 capsule by mouth daily      COMPOUNDED NON-CONTROLLED SUBSTANCE (CMPD RX) - PHARMACY TO MIX COMPOUNDED MEDICATION Compounded semaglutide 1.7 mg (34 units) subcutaneous once a week. Ok to compound during Wegovy shortage. 2 mL 2    escitalopram (LEXAPRO)  10 MG tablet Take 1 tablet (10 mg) by mouth daily. Take 5 mg daily for 6 days, then increase to 10 mg daily. 90 tablet 1    ferrous sulfate 140 (45 Fe) MG TBCR CR tablet Take 140 mg by mouth daily      lisinopril-hydrochlorothiazide (ZESTORETIC) 10-12.5 MG tablet Take 1 tablet by mouth daily 90 tablet 4    multivitamin w/minerals (THERA-VIT-M) tablet Take 1 tablet by mouth daily      rosuvastatin (CRESTOR) 5 MG tablet Take 1 tablet (5 mg) by mouth daily. 90 tablet 0    Vitamin D3 (CHOLECALCIFEROL) 25 mcg (1000 units) tablet Take 1 tablet (1,000 Units) by mouth daily Take one tablet daily. 90 tablet 3     No current facility-administered medications for this visit.      Past Medical History:   Patient Active Problem List   Diagnosis    Family history of early CAD    Benign essential hypertension    Major depressive disorder, recurrent episode, in full remission (H)     Past Medical History:   Diagnosis Date    Benign essential hypertension 01/06/2023    Major depressive disorder, recurrent episode, in full remission (H) 01/06/2023       CC No referring provider defined for this encounter. on close of this encounter.

## 2024-12-10 ENCOUNTER — OFFICE VISIT (OUTPATIENT)
Dept: DERMATOLOGY | Facility: CLINIC | Age: 54
End: 2024-12-10
Payer: COMMERCIAL

## 2024-12-10 DIAGNOSIS — L60.0 INGROWN NAIL: ICD-10-CM

## 2024-12-10 DIAGNOSIS — D18.01 CHERRY ANGIOMA: ICD-10-CM

## 2024-12-10 DIAGNOSIS — Z12.83 ENCOUNTER FOR SCREENING FOR MALIGNANT NEOPLASM OF SKIN: Primary | ICD-10-CM

## 2024-12-10 DIAGNOSIS — L21.9 SEBORRHEIC DERMATITIS: ICD-10-CM

## 2024-12-10 DIAGNOSIS — L81.4 LENTIGINES: ICD-10-CM

## 2024-12-10 DIAGNOSIS — D22.9 MULTIPLE NEVI: ICD-10-CM

## 2024-12-10 DIAGNOSIS — L82.1 SEBORRHEIC KERATOSES: ICD-10-CM

## 2024-12-10 RX ORDER — KETOCONAZOLE 20 MG/ML
SHAMPOO, SUSPENSION TOPICAL
Qty: 120 ML | Refills: 11 | Status: SHIPPED | OUTPATIENT
Start: 2024-12-11

## 2024-12-10 ASSESSMENT — PAIN SCALES - GENERAL: PAINLEVEL_OUTOF10: MILD PAIN (3)

## 2024-12-10 NOTE — PATIENT INSTRUCTIONS
Patient Education        Proper skin care from [facility]:     -Eliminate harsh soaps as they strip the natural oils from the skin, often resulting in dry itchy skin ( i.e. Dial, Zest, Japanese Spring)  -Use mild soaps such as Cetaphil or Dove Sensitive Skin in the shower. You do not need to use soap on arms, legs, and trunk every time you shower unless visibly soiled.   -Avoid hot or cold showers.  -After showering, lightly dry off and apply moisturizing within 2-3 minutes. This will help trap moisture in the skin.   -Aggressive use of a moisturizer at least 1-2 times a day to the entire body (including -Vanicream, Cetaphil, Aquaphor or Cerave) and moisturize hands after every washing.  -We recommend using moisturizers that come in a tub that needs to be scooped out, not a pump. This has more of an oil base. It will hold moisture in your skin much better than a water base moisturizer. The above recommended are non-pore clogging.        Wear a sunscreen with at least SPF 30 on your face, ears, neck and V of the chest daily. Wear sunscreen on other areas of the body if those areas are exposed to the sun throughout the day. Sunscreens can contain physical and/or chemical blockers. Physical blockers are less likely to clog pores, these include zinc oxide and titanium dioxide. Reapply every two hour and after swimming.      Sunscreen examples: https://www.ewg.org/sunscreen/     UV radiation  UVA radiation remains constant throughout the day and throughout the year. It is a longer wavelength than UVB and therefore penetrates deeper into the skin leading to immediate and delayed tanning, photoaging, and skin cancer. 70-80% of UVA and UVB radiation occurs between the hours of 10am-2pm.  UVB radiation  UVB radiation causes the most harmful effects and is more significant during the summer months. However, snow and ice can reflect UVB radiation leading to skin damage during the winter months as well. UVB radiation is  responsible for tanning, burning, inflammation, delayed erythema (pinkness), pigmentation (brown spots), and skin cancer.      I recommend self monthly full body exams and yearly full body exams with a dermatology provider. If you develop a new or changing lesion please follow up for examination. Most skin cancers are pink and scaly or pink and pearly. However, we do see blue/brown/black skin cancers.  Consider the ABCDEs of melanoma when giving yourself your monthly full body exam ( don't forget the groin, buttocks, feet, toes, etc). A-asymmetry, B-borders, C-color, D-diameter, E-elevation or evolving. If you see any of these changes please follow up in clinic. If you cannot see your back I recommend purchasing a hand held mirror to use with a larger wall mirror.       Checking for Skin Cancer  You can find cancer early by checking your skin each month. There are 3 kinds of skin cancer. They are melanoma, basal cell carcinoma, and squamous cell carcinoma. Doing monthly skin checks is the best way to find new marks or skin changes. Follow the instructions below for checking your skin.   The ABCDEs of checking moles for melanoma   Check your moles or growths for signs of melanoma using ABCDE:   Asymmetry: the sides of the mole or growth don t match  Border: the edges are ragged, notched, or blurred  Color: the color within the mole or growth varies  Diameter: the mole or growth is larger than 6 mm (size of a pencil eraser)  Evolving: the size, shape, or color of the mole or growth is changing (evolving is not shown in the images below)    Checking for other types of skin cancer  Basal cell carcinoma or squamous cell carcinoma have symptoms such as:      A spot or mole that looks different from all other marks on your skin  Changes in how an area feels, such as itching, tenderness, or pain  Changes in the skin's surface, such as oozing, bleeding, or scaliness  A sore that does not heal  New swelling or redness beyond  the border of a mole     Who s at risk?  Anyone can get skin cancer. But you are at greater risk if you have:   Fair skin, light-colored hair, or light-colored eyes  Many moles or abnormal moles on your skin  A history of sunburns from sunlight or tanning beds  A family history of skin cancer  A history of exposure to radiation or chemicals  A weakened immune system  If you have had skin cancer in the past, you are at risk for recurring skin cancer.   How to check your skin  Do your monthly skin checkups in front of a full-length mirror. Check all parts of your body, including your:   Head (ears, face, neck, and scalp)  Torso (front, back, and sides)  Arms (tops, undersides, upper, and lower armpits)  Hands (palms, backs, and fingers, including under the nails)  Buttocks and genitals  Legs (front, back, and sides)  Feet (tops, soles, toes, including under the nails, and between toes)  If you have a lot of moles, take digital photos of them each month. Make sure to take photos both up close and from a distance. These can help you see if any moles change over time.   Most skin changes are not cancer. But if you see any changes in your skin, call your doctor right away. Only he or she can diagnose a problem. If you have skin cancer, seeing your doctor can be the first step toward getting the treatment that could save your life.   [employer] last reviewed this educational content on 4/1/2019 2000-2020 [employer]. [address] [place] [address], [place] [address]. All rights reserved. This information is not intended as a substitute for professional medical care. Always follow your healthcare professional's instructions.        When should I call my doctor?  If you are worsening or not improving, please, contact us or seek urgent care as noted below.      Who should I call with questions (adults)?  [facility] (adult and pediatric): [phone]  [facility]  [place] (adult): [phone]  [facility] ([place], [place], [place] and [place]) [phone]  For urgent needs outside of business hours call the [facility] at [phone] and ask for the dermatology resident on call to be paged  If this is a medical emergency and you are unable to reach an ER, Call 911        If you need a prescription refill, please contact your pharmacy. Refills are approved or denied by our Physicians during normal business hours, Monday through Fridays  Per office policy, refills will not be granted if you have not been seen within the past year (or sooner depending on your child's condition)

## 2024-12-10 NOTE — LETTER
12/10/2024       RE: Karin Santacruz  [address] [place] [address]     Dear Colleague,    Thank you for referring your patient, Karin Santacruz, to the [facility] [place] at [facility]. Please see a copy of my visit note below.    [facility] Dermatology Note  Encounter Date: Dec 10, 2024  Office Visit     Reviewed patients past medical history and pertinent chart review prior to patients visit today.     Dermatology Problem List:  1. Family history melanoma (mother).   2. History of mildly atypical nevus, left breast, 2020.     ____________________________________________    Assessment & Plan:     # Seborrheic dermatitis, scalp   - Start ketoconazole 2% shampoo three times weekly. Advised to lather in the shower, waiting 5-10 minutes before rinsing.     # Hair loss  - Discussed this may be multifactorial with seborrheic dermatitis, weight loss, and Semiglutide contributing. If the hair loss persists despite use of ketoconazole I recommend starting minoxidil 5% solution daily. We discussed potential side effects including scalp irritation.    # Ingrown toenail  - Podiatry order palaced. No sign of infection today, continue to monitor for signs of infection.     # Multiple nevi, trunk and extremities  # Solar lentigines  - The patient's lesions of concern involving the right medial thigh are consistent with nevi with reassuring features on dermoscopy.   - No concerning features on dermoscopy. We discussed the importance of self exams at home. ABCDE criteria and importance of SPF 30+ sunscreen and photoprotective clothing reviewed.     # Cherry angiomas  # Seborrheic keratoses  - We discussed the benign nature of the skin lesions. No treatment required. Continued observation recommended. Follow up with any concerns.      Follow-up:  Annual for follow up full body skin exam, as needed for new or changing  lesions or new concerns    All risks, benefits and alternatives were discussed with patient.  Patient is in agreement and understands the assessment and plan.  All questions were answered.  Caprice Mathew PA-C  [facility]    ____________________________________________    CC: Skin Check (Karin is here today for a skin check and is concerned about moles on the right inner thigh. She states there has been an increase of hair loss. She states her toe is red and inflamed/might be in grown)    HPI:  Ms. Karin Santacruz is a(n) 54 year old female who presents today as a return patient for a full body skin cancer screening.     Concern 1:   - Pain of the left 1st toe  - Started in October  - Worsened with wearing heels    Concern 2:   - Hair thinning  - Began about 6 months ago  - Patient began semiglutide 6 months ago, has lost 25 lbs  - Eats a balanced diet  - No family history of hair loss    Concern 3:  - New moles involving the right medial thigh    No other specific cutaneous concerns today. The patient reports trying to be diligent with photoprotection.      Physical Exam:  Vitals: LMP 01/05/2022   SKIN: Total skin excluding the genitalia areas was performed. The exam included the scalp, face, neck, bilateral arms, chest, back, abdomen, bilateral legs, digits, mons pubis, buttocks, and nails.   - Pineda II.  - Greasy yellow scale with background erythema involving the temporal scalp.   - Ingrowing of the medial left 1st toenail.   - Multiple tan/brown macules and papules scattered throughout exam, consistent with benign nevi. No concerning features on dermoscopy.   - Scattered tan, homogenous macules scattered on sun exposed skin, consistent with solar lentigines.   - Scattered waxy, stuck on appearing papules and patches, consistent with seborrheic keratoses.    - Several 1-2 mm red dome shaped symmetric papules, consistent with cherry angiomas.     Medications:  Current Outpatient  Medications   Medication Sig Dispense Refill     coenzyme Q-10 capsule Take 1 capsule by mouth daily       COMPOUNDED NON-CONTROLLED SUBSTANCE (CMPD RX) - PHARMACY TO MIX COMPOUNDED MEDICATION Compounded semaglutide 1.7 mg (34 units) subcutaneous once a week. Ok to compound during Wegovy shortage. 2 mL 2     escitalopram (LEXAPRO) 10 MG tablet Take 1 tablet (10 mg) by mouth daily. Take 5 mg daily for 6 days, then increase to 10 mg daily. 90 tablet 1     ferrous sulfate 140 (45 Fe) MG TBCR CR tablet Take 140 mg by mouth daily       lisinopril-hydrochlorothiazide (ZESTORETIC) 10-12.5 MG tablet Take 1 tablet by mouth daily 90 tablet 4     multivitamin w/minerals (THERA-VIT-M) tablet Take 1 tablet by mouth daily       rosuvastatin (CRESTOR) 5 MG tablet Take 1 tablet (5 mg) by mouth daily. 90 tablet 0     Vitamin D3 (CHOLECALCIFEROL) 25 mcg (1000 units) tablet Take 1 tablet (1,000 Units) by mouth daily Take one tablet daily. 90 tablet 3     No current facility-administered medications for this visit.      Past Medical History:   Patient Active Problem List   Diagnosis     Family history of early CAD     Benign essential hypertension     Major depressive disorder, recurrent episode, in full remission (H)     Past Medical History:   Diagnosis Date     Benign essential hypertension 01/06/2023     Major depressive disorder, recurrent episode, in full remission (H) 01/06/2023       CC No referring provider defined for this encounter. on close of this encounter.      Again, thank you for allowing me to participate in the care of your patient.      Sincerely,    Caprice Mathew PA-C

## 2024-12-10 NOTE — NURSING NOTE
Dermatology Rooming Note    Karin Santacruz's goals for this visit include:   Chief Complaint   Patient presents with    Skin Check     Karin is here today for a skin check and is concerned about moles on the right inner thigh. She states there has been an increase of hair loss. She states her toe is red and inflamed/might be in grown     Kayrene S., CMA

## 2024-12-11 ENCOUNTER — PATIENT OUTREACH (OUTPATIENT)
Dept: CARE COORDINATION | Facility: CLINIC | Age: 54
End: 2024-12-11
Payer: COMMERCIAL

## 2024-12-11 ENCOUNTER — TELEPHONE (OUTPATIENT)
Dept: DERMATOLOGY | Facility: CLINIC | Age: 54
End: 2024-12-11
Payer: COMMERCIAL

## 2024-12-11 NOTE — TELEPHONE ENCOUNTER
12/11 Left Voicemail (1st Attempt) and Sent Mychart (1st Attempt) for the patient to call back and schedule the following:    Appointment type: Return Dermatology  Provider: Priyanka  Return date: 12/12/2025  Specialty phone number: [phone]  Additional appointment(s) needed: n/a  Additonal Notes: n/a

## 2024-12-26 ENCOUNTER — OFFICE VISIT (OUTPATIENT)
Dept: PODIATRY | Facility: CLINIC | Age: 54
End: 2024-12-26
Attending: PHYSICIAN ASSISTANT
Payer: COMMERCIAL

## 2024-12-26 VITALS — SYSTOLIC BLOOD PRESSURE: 105 MMHG | HEART RATE: 77 BPM | DIASTOLIC BLOOD PRESSURE: 70 MMHG

## 2024-12-26 DIAGNOSIS — L60.0 INGROWN NAIL: ICD-10-CM

## 2024-12-26 DIAGNOSIS — B35.1 ONYCHOMYCOSIS: Primary | ICD-10-CM

## 2024-12-26 RX ORDER — CICLOPIROX 80 MG/ML
SOLUTION TOPICAL DAILY
Qty: 6 ML | Refills: 4 | Status: SHIPPED | OUTPATIENT
Start: 2024-12-26 | End: 2025-12-26

## 2024-12-26 NOTE — PATIENT INSTRUCTIONS
We wish you continued good healing. If you have any questions or concerns, please do not hesitate to contact us at  [phone]    [employer] (secure e-mail communication and access to your chart) to send a message or to make an appointment.    Please remember to call and schedule a follow up appointment if one was recommended at your earliest convenience.     PODIATRY CLINIC HOURS  TELEPHONE NUMBER    Dr. Wilson RICHEYPVIK FACFAS        Clinics:  [facility]  Tuesday 1PM-6PM  [place]  Wednesday 745AM-330PM  [place]  Monday 2nd,4th  830AM-4PM  Thursday/Friday 745AM-230PM     [facility] APPOINTMENTS  [phone]    [place] APPOINTMENTS  [phone]          If you need a medication refill, please contact us you may need lab work and/or a follow up visit prior to your refill (i.e. Antifungal medications).  If MRI needed please call Imaging at [phone]   HOW DO I GET MY KNEE SCOOTER? Knee scooters can be picked up at ANY Medical Supply stores with your knee scooter Prescription.  OR  Bring your signed prescription to an [facility] Medical [facility] showroom.   Set up an appointment for your custom Orthotics. Call any Orthotics locations call [phone]         [place] TOENAIL REMOVAL AFTERCARE     Go directly home and elevate the affected foot on one or two pillows for the remainder of the day/evening if possible. Your toe may stay numb anywhere from 2-8 hours.   Take Tylenol, ibuprofen or another anti-inflammatory as needed for pain.   That evening of the procedure,  you may remove the bandage.(you may soak it in warm soapy water ) After soaks, pat the area dry and then allow to airdry for a few minutes. Apply antibiotic ointment to the area and cover with  band-aid.  The following day. Find a shoe that is comfortable and minimizes the amount of rubbing on your toe, as this increases pain.  Dress with band-aids until no  longer draining, typically 3 days.  Watch for any signs and symptoms of infection such as: redness, red streaks going up the foot/leg, swelling, pus or foul odor. Fevers > 101   Please call with questions.    Dr. Wilson Avila D.P.M FAC FAS

## 2024-12-26 NOTE — LETTER
2024      Karin Santacruz  [address] [place] [address]      Dear Colleague,    Thank you for referring your patient, Karin Santacruz, to the [facility]. Please see a copy of my visit note below.    Subjective:    Pt is seen today in consult from Caprice Mathew w/ the c/c of a painful ingrown left great nail medial border.  This has been problematic for 2 month(s).  negativehistory of drainage from the site. This is slowly getting worse.  Aggravated by activity and relieved by rest.  Has tried soaking which has not helped.  Started when patient was standing in heels.  Forefoot may have been tight.   Denies fever and chill, denies numbness and tingling, denies erythema on dorsum of foot.  She has never had this before.    ROS:  see above    Past Medical History:   Diagnosis Date     Benign essential hypertension 2023     Major depressive disorder, recurrent episode, in full remission (H) 2023       Past Surgical History:   Procedure Laterality Date      SECTION        SECTION       COLONOSCOPY N/A 2020    Procedure: COLONOSCOPY;  Surgeon: Hyun Perdomo MD;  Location:  GI     COLONOSCOPY N/A 2020    Procedure: Colonoscopy, With Polypectomy And Biopsy;  Surgeon: Hyun Perdomo MD;  Location:  GI       Family History   Problem Relation Age of Onset     Breast Cancer Mother 80     Melanoma Mother      Angina Father      Hypertension Father      Ovarian Cancer No family hx of      Skin Cancer No family hx of        Social History     Tobacco Use     Smoking status: Former     Smokeless tobacco: Never     Tobacco comments:     Smoked for 10 years, quit 25 years ago   Substance Use Topics     Alcohol use: Yes     Comment: occ         Current Outpatient Medications:      ciclopirox (PENLAC) 8 % external solution, Apply topically daily., Disp: 6 mL, Rfl: 4     coenzyme Q-10 capsule, Take 1 capsule by mouth daily, Disp: , Rfl:       COMPOUNDED NON-CONTROLLED SUBSTANCE (CMPD RX) - PHARMACY TO MIX COMPOUNDED MEDICATION, Compounded semaglutide 1.7 mg (34 units) subcutaneous once a week. Ok to compound during Wegovy shortage., Disp: 2 mL, Rfl: 2     escitalopram (LEXAPRO) 10 MG tablet, Take 1 tablet (10 mg) by mouth daily. Take 5 mg daily for 6 days, then increase to 10 mg daily., Disp: 90 tablet, Rfl: 1     ferrous sulfate 140 (45 Fe) MG TBCR CR tablet, Take 140 mg by mouth daily, Disp: , Rfl:      ketoconazole (NIZORAL) 2 % external shampoo, Apply topically three times a week. Lather in the shower, wait 3-5 minutes before rinsing., Disp: 120 mL, Rfl: 11     lisinopril-hydrochlorothiazide (ZESTORETIC) 10-12.5 MG tablet, Take 1 tablet by mouth daily, Disp: 90 tablet, Rfl: 4     multivitamin w/minerals (THERA-VIT-M) tablet, Take 1 tablet by mouth daily, Disp: , Rfl:      rosuvastatin (CRESTOR) 5 MG tablet, Take 1 tablet (5 mg) by mouth daily., Disp: 90 tablet, Rfl: 0     Vitamin D3 (CHOLECALCIFEROL) 25 mcg (1000 units) tablet, Take 1 tablet (1,000 Units) by mouth daily Take one tablet daily., Disp: 90 tablet, Rfl: 3       Allergies   Allergen Reactions     No Known Allergies        /70   Pulse 77   LMP 01/05/2022 .      Constitutional/ general:  Pt is in no apparent distress, appears well-nourished.  Cooperative with history and physical exam.     Psych:  The patient answered questions appropriately.  Normal affect.  Seems to have reasonable expectations, in terms of treatment.     Lungs:  Non labored breathing, non labored speech. No cough.  No audible wheezing. Even, quiet breathing.       Vascular:  Pedal pulses are palpable bilaterally for both the DP and PT arteries.  CFT < 3 sec.  No ankle varicosities or edema.  Pedal hair growth noted.     Neuro:  Alert and oriented x 3. Coordinated gait.  Light touch sensation is intact     Derm: Normal texture and turgor.  No ecchymosis, or cyanosis.  Hair growth noted.        Musculoskeletal:      Patient is ambulatory without an assistive device or brace.   left great toe nail medial border shows soft tissue impingement with localized erythema.   negative active drainage/purulence at this time.  No sinus tracts.  No nailbed masses or exostosis.  No pain with range of motion of IPJ or MTPJ.  No ascending cellulitis.  We note medial border thickened discolored with subungual debris and white discoloration.  There are lines transverse.    ASSESSMENT:    Onychocryptosis with paronychia left toe.  Left hallux onychomycosis with Beaus lines    Discussed cause of transverse lines and onychomycosis.  Explained how repetitive trauma can cause onychomycosis.  Will try to keep nail offloaded in the future.   Discussed etiology and treatment options in detail w/ the pt.  The potential causes and nature of an ingrown toenail were discussed with the patient.  We reviewed the natural history/prognosis of the condition and potential risks if no treatment is provided.      After thorough discussion and answering all questions, the patient elected to have nail avulsion.  Obtained consent, used 3cc of 1% lidocaine plain to block left first toe.  Sterile prep, then avulsed the affected border(s).  No evidence of deep abscess noted.  Pt tolerated procedure well.  Sterile bandage placed, gave wound care instruction.  Instructed patient on trimming nails correctly.  They will avoid tight shoes.  Discussed Penlac to treat onychomycosis.  She would like to do this.  We wrote her order for this and instructed her on how to use it.  Discussed permanent removal of border if chronic problem.  Return to clinic prn.    Wilson Avila DPM, FACFAS        Again, thank you for allowing me to participate in the care of your patient.        Sincerely,        Wilson Avila DPM    Electronically signed

## 2024-12-26 NOTE — PROGRESS NOTES
Subjective:    Pt is seen today in consult from Cap[facility] Mathew w/ the c/c of a painful ingrown left great nail medial border.  This has been problematic for 2 month(s).  negativehistory of drainage from the site. This is slowly getting worse.  Aggravated by activity and relieved by rest.  Has tried soaking which has not helped.  Started when patient was standing in heels.  Forefoot may have been tight.   Denies fever and chill, denies numbness and tingling, denies erythema on dorsum of foot.  She has never had this before.    ROS:  see above    Past Medical History:   Diagnosis Date    Benign essential hypertension 2023    Major depressive disorder, recurrent episode, in full remission (H) 2023       Past Surgical History:   Procedure Laterality Date     SECTION       SECTION      COLONOSCOPY N/A 2020    Procedure: COLONOSCOPY;  Surgeon: Hyun Perdomo MD;  Location:  GI    COLONOSCOPY N/A 2020    Procedure: Colonoscopy, With Polypectomy And Biopsy;  Surgeon: Hyun Perdomo MD;  Location: [facility]       Family History   Problem Relation Age of Onset    Breast Cancer Mother 80    Melanoma Mother     Angina Father     Hypertension Father     Ovarian Cancer No family hx of     Skin Cancer No family hx of        Social History     Tobacco Use    Smoking status: Former    Smokeless tobacco: Never    Tobacco comments:     Smoked for 10 years, quit 25 years ago   Substance Use Topics    Alcohol use: Yes     Comment: occ         Current Outpatient Medications:     ciclopirox (PENLAC) 8 % external solution, Apply topically daily., Disp: 6 mL, Rfl: 4    coenzyme Q-10 capsule, Take 1 capsule by mouth daily, Disp: , Rfl:     COMPOUNDED NON-CONTROLLED SUBSTANCE (CMPD RX) - PHARMACY TO MIX COMPOUNDED MEDICATION, Compounded semaglutide 1.7 mg (34 units) subcutaneous once a week. Ok to compound during Wegovy shortage., Disp: 2 mL, Rfl: 2    escitalopram (LEXAPRO) 10 MG tablet, Take 1  tablet (10 mg) by mouth daily. Take 5 mg daily for 6 days, then increase to 10 mg daily., Disp: 90 tablet, Rfl: 1    ferrous sulfate 140 (45 Fe) MG TBCR CR tablet, Take 140 mg by mouth daily, Disp: , Rfl:     ketoconazole (NIZORAL) 2 % external shampoo, Apply topically three times a week. Lather in the shower, wait 3-5 minutes before rinsing., Disp: 120 mL, Rfl: 11    lisinopril-hydrochlorothiazide (ZESTORETIC) 10-12.5 MG tablet, Take 1 tablet by mouth daily, Disp: 90 tablet, Rfl: 4    multivitamin w/minerals (THERA-VIT-M) tablet, Take 1 tablet by mouth daily, Disp: , Rfl:     rosuvastatin (CRESTOR) 5 MG tablet, Take 1 tablet (5 mg) by mouth daily., Disp: 90 tablet, Rfl: 0    Vitamin D3 (CHOLECALCIFEROL) 25 mcg (1000 units) tablet, Take 1 tablet (1,000 Units) by mouth daily Take one tablet daily., Disp: 90 tablet, Rfl: 3       Allergies   Allergen Reactions    No Known Allergies        /70   Pulse 77   LMP 01/05/2022 .      Constitutional/ general:  Pt is in no apparent distress, appears well-nourished.  Cooperative with history and physical exam.     Psych:  The patient answered questions appropriately.  Normal affect.  Seems to have reasonable expectations, in terms of treatment.     Lungs:  Non labored breathing, non labored speech. No cough.  No audible wheezing. Even, quiet breathing.       Vascular:  Pedal pulses are palpable bilaterally for both the DP and PT arteries.  CFT < 3 sec.  No ankle varicosities or edema.  Pedal hair growth noted.     Neuro:  Alert and oriented x 3. Coordinated gait.  Light touch sensation is intact     Derm: Normal texture and turgor.  No ecchymosis, or cyanosis.  Hair growth noted.        Musculoskeletal:     Patient is ambulatory without an assistive device or brace.   left great toe nail medial border shows soft tissue impingement with localized erythema.   negative active drainage/purulence at this time.  No sinus tracts.  No nailbed masses or exostosis.  No pain with  range of motion of IPJ or MTPJ.  No ascending cellulitis.  We note medial border thickened discolored with subungual debris and white discoloration.  There are lines transverse.    ASSESSMENT:    Onychocryptosis with paronychia left toe.  Left hallux onychomycosis with Beaus lines    Discussed cause of transverse lines and onychomycosis.  Explained how repetitive trauma can cause onychomycosis.  Will try to keep nail offloaded in the future.   Discussed etiology and treatment options in detail w/ the pt.  The potential causes and nature of an ingrown toenail were discussed with the patient.  We reviewed the natural history/prognosis of the condition and potential risks if no treatment is provided.      After thorough discussion and answering all questions, the patient elected to have nail avulsion.  Obtained consent, used 3cc of 1% lidocaine plain to block left first toe.  Sterile prep, then avulsed the affected border(s).  No evidence of deep abscess noted.  Pt tolerated procedure well.  Sterile bandage placed, gave wound care instruction.  Instructed patient on trimming nails correctly.  They will avoid tight shoes.  Discussed Penlac to treat onychomycosis.  She would like to do this.  We wrote her order for this and instructed her on how to use it.  Discussed permanent removal of border if chronic problem.  Return to clinic prn.    Wilson Avila DPM, MARTINA       MD Office

## 2025-01-29 ENCOUNTER — MYC REFILL (OUTPATIENT)
Dept: CARDIOLOGY | Facility: CLINIC | Age: 55
End: 2025-01-29
Payer: COMMERCIAL

## 2025-01-29 DIAGNOSIS — E66.811 OBESITY (BMI 30.0-34.9): ICD-10-CM

## 2025-02-03 NOTE — TELEPHONE ENCOUNTER
COMPOUNDED NON-CONTROLLED SUBSTANCE (CMPD RX) - PHARMACY TO MIX COMPOUNDED MEDICATION 2 mL 3 1/30/2025 -- No   Sig: Compounded semaglutide 1.7 mg (34 units) subcutaneous once a week. Ok to compound during Wegovy shortage.

## 2025-02-05 ENCOUNTER — MYC REFILL (OUTPATIENT)
Dept: FAMILY MEDICINE | Facility: CLINIC | Age: 55
End: 2025-02-05
Payer: COMMERCIAL

## 2025-02-05 DIAGNOSIS — E78.5 DYSLIPIDEMIA: ICD-10-CM

## 2025-02-09 RX ORDER — ROSUVASTATIN CALCIUM 5 MG/1
5 TABLET, COATED ORAL DAILY
Qty: 90 TABLET | Refills: 0 | Status: SHIPPED | OUTPATIENT
Start: 2025-02-09

## 2025-02-09 NOTE — TELEPHONE ENCOUNTER
Last Written Prescription:  10/9/24  # 90   ----------------------  Last Visit Date: 4/5/24    Future Visit Date: NONE  ----------------------    [x]  Refill decision: Medication refilled per  Medication Refill in Ambulatory Care  policy.    Request from pharmacy:  Requested Prescriptions   Pending Prescriptions Disp Refills    rosuvastatin (CRESTOR) 5 MG tablet 90 tablet 0     Sig: Take 1 tablet (5 mg) by mouth daily.       Antihyperlipidemic agents Passed - 2/9/2025 12:16 AM        Passed - LDL on file in the past 12 months        Passed - Medication is active on med list        Passed - Recent (12 mo) or future (90 days) visit within the authorizing provider's specialty     The patient must have completed an in-person or virtual visit within the past 12 months or has a future visit scheduled within the next 90 days with the authorizing provider s specialty.  Urgent care and e-visits do not qualify as an office visit for this protocol.          Passed - Patient is age 18 years or older        Passed - No active pregnancy on record        Passed - No positive pregnancy test in past 12 mos

## 2025-03-04 ENCOUNTER — VIRTUAL VISIT (OUTPATIENT)
Dept: ENDOCRINOLOGY | Facility: CLINIC | Age: 55
End: 2025-03-04
Payer: COMMERCIAL

## 2025-03-04 VITALS — WEIGHT: 164 LBS | HEIGHT: 65 IN | BODY MASS INDEX: 27.32 KG/M2

## 2025-03-04 DIAGNOSIS — E66.811 OBESITY (BMI 30.0-34.9): Primary | ICD-10-CM

## 2025-03-04 PROCEDURE — 1126F AMNT PAIN NOTED NONE PRSNT: CPT | Performed by: INTERNAL MEDICINE

## 2025-03-04 PROCEDURE — G2211 COMPLEX E/M VISIT ADD ON: HCPCS | Performed by: INTERNAL MEDICINE

## 2025-03-04 PROCEDURE — 98006 SYNCH AUDIO-VIDEO EST MOD 30: CPT | Performed by: INTERNAL MEDICINE

## 2025-03-04 ASSESSMENT — PAIN SCALES - GENERAL: PAINLEVEL_OUTOF10: NO PAIN (0)

## 2025-03-04 NOTE — PROGRESS NOTES
Return Medical Weight Management Note     Karin Santacruz  MRN:  [id number]  :  1970  ZOEY:  3/4/2025    Dear Shahla Billings MD,    I had the pleasure of seeing your patient Karin Santacruz. She is a 55 year old female who I am continuing to see for treatment of obesity related to: hypertension, hyperlipidemia, depression, anxiety         2024     2:41 PM   --   I have the following health issues associated with obesity High Blood Pressure    High Cholesterol   I have the following symptoms associated with obesity Depression    Back Pain    Fatigue   Weight history: started to gain weight around s.  In : weight was 167 lbs. Went on Weight loss program ([place] weight [facility]) --> Lost 25 lbs (cut down calories, frequent follow up). Gradually gained back later. Weight is up and down since then.    Eating habit: eat regular, crave high carb/fat food in general, compulsive eating, eat past full, mindless eating, lack of meal planning, Take-out or eat-out about 50%     Did [facility] 2 years ago -- outpatient intensive program -- maintained weight   Work with therapist on eating related issue.     INTERVAL HISTORY:  Initial visit 2024.    Started compound semaglutide 2024.  Lost 32 lbs. Medication is helpful in reducing food noise, food thought and obsessing.   Has struggled hair loss and constipation about 4 months ago.   Would like to continue with current dose    CURRENT WEIGHT:   164 lbs 0 oz    Initial Weight (lbs): 196 lbs     Cumulative weight loss (lbs): 32  Weight Loss Percentage: 16.33%        3/3/2025     2:18 PM   Changes and Difficulties   I have made the following changes to my diet since my last visit: Higher protein,  less processed food   With regards to my diet, I am still struggling with: drinking enough water   I have made the following changes to my activity/exercise since my last visit: none   With regards to my activity/exercise, I am still struggling with: following  "through on strength training       VITALS:  Ht 1.651 m (5' 5\")   Wt 74.4 kg (164 lb)   LMP 01/05/2022   BMI 27.29 kg/m      MEDICATIONS:   Current Outpatient Medications   Medication Sig Dispense Refill    ciclopirox (PENLAC) 8 % external solution Apply topically daily. 6 mL 4    coenzyme Q-10 capsule Take 1 capsule by mouth daily      COMPOUNDED NON-CONTROLLED SUBSTANCE (CMPD RX) - PHARMACY TO MIX COMPOUNDED MEDICATION Compounded semaglutide 1.7 mg (34 units) subcutaneous once a week. Ok to compound during Wegovy shortage. 2 mL 3    escitalopram (LEXAPRO) 10 MG tablet Take 1 tablet (10 mg) by mouth daily. Take 5 mg daily for 6 days, then increase to 10 mg daily. 90 tablet 1    ferrous sulfate 140 (45 Fe) MG TBCR CR tablet Take 140 mg by mouth daily      ketoconazole (NIZORAL) 2 % external shampoo Apply topically three times a week. Lather in the shower, wait 3-5 minutes before rinsing. 120 mL 11    lisinopril-hydrochlorothiazide (ZESTORETIC) 10-12.5 MG tablet Take 1 tablet by mouth daily 90 tablet 4    multivitamin w/minerals (THERA-VIT-M) tablet Take 1 tablet by mouth daily      rosuvastatin (CRESTOR) 5 MG tablet Take 1 tablet (5 mg) by mouth daily. 90 tablet 0    Vitamin D3 (CHOLECALCIFEROL) 25 mcg (1000 units) tablet Take 1 tablet (1,000 Units) by mouth daily Take one tablet daily. 90 tablet 3           3/3/2025     2:18 PM   Weight Loss Medication History Reviewed With Patient   Which weight loss medications are you currently taking on a regular basis? Ozempic   Are you having any side effects from the weight loss medication that we have prescribed you? Yes   If you are having side effects please describe: hair loss; constipation       ASSESSMENT:   Karin Arroyobs is a 55 year old female who I am continuing to see for treatment of obesity related to: hypertension, hyperlipidemia, depression, anxiety     Responds well to compound semaglutide 1.7 mg weekly  Has struggled with hair loss and constipation but " tolerable.  Would like to continue with current dose.  Discuss that compound semaglutide will be manufactured until April as Wegovy is no long on shortage list.  Will change to compound tirzepatide    PLAN:   - compound tirzepatide 7.5 mg weekly  - continue heealthy diet modification  - encourage cardio and weight bearing exercise    FOLLOW-UP:    Return to clinic in 4-6 months.    Joined the call at 3/4/2025, 3:50:40 pm.  Left the call at 3/4/2025, 4:04:07 pm.  You were on the call for 13 minutes 27 seconds .      Sincerely,    Parul Lane MD

## 2025-03-04 NOTE — NURSING NOTE
Current patient location: work     Is the patient currently in the state of [place]? YES    Visit mode: VIDEO    If the visit is dropped, the patient can be reconnected by:VIDEO VISIT: Text to cell phone:   Telephone Information:   Mobile [phone]       Will anyone else be joining the visit? NO  (If patient encounters technical issues they should call [phone] :[phone])    Are changes needed to the allergy or medication list? Pt stated no changes to allergies and Pt stated no med changes    Are refills needed on medications prescribed by this physician? Discuss with provider    Rooming Documentation:  Questionnaire(s) completed      Reason for visit: RECHECK    Wt other than 24 hrs:  Friday   Pain more than one location:  no  Darline ROCHA

## 2025-03-04 NOTE — LETTER
3/4/2025       RE: Karin Santacruz  [address] [place] [address]     Dear Colleague,    Thank you for referring your patient, Karin Santacruz, to the [facility] [place] [facility]. Please see a copy of my visit note below.    Virtual Visit Details    Type of service:  Video Visit     Originating Location (pt. Location): Home    Distant Location (provider location):  Off-site  Platform used for Video Visit: [facility] Medical Weight Management Note     Karin Santacruz  MRN:  [id number]  :  1970  ZOEY:  3/4/2025    Dear Shahla Billings MD,    I had the pleasure of seeing your patient Karin Santacruz. She is a 55 year old female who I am continuing to see for treatment of obesity related to: hypertension, hyperlipidemia, depression, anxiety         2024     2:41 PM   --   I have the following health issues associated with obesity High Blood Pressure    High Cholesterol   I have the following symptoms associated with obesity Depression    Back Pain    Fatigue   Weight history: started to gain weight around s.  In : weight was 167 lbs. Went on Weight loss program ([place] weight loss) --> Lost 25 lbs (cut down calories, frequent follow up). Gradually gained back later. Weight is up and down since then.    Eating habit: eat regular, crave high carb/fat food in general, compulsive eating, eat past full, mindless eating, lack of meal planning, Take-out or eat-out about 50%     Did [facility] 2 years ago -- outpatient intensive program -- maintained weight   Work with therapist on eating related issue.     INTERVAL HISTORY:  Initial visit 2024.    Started compound semaglutide 2024.  Lost 32 lbs. Medication is helpful in reducing food noise, food thought and obsessing.   Has struggled hair loss and constipation about 4 months ago.   Would like to continue with current dose    CURRENT WEIGHT:  "  164 lbs 0 oz    Initial Weight (lbs): 196 lbs     Cumulative weight loss (lbs): 32  Weight Loss Percentage: 16.33%        3/3/2025     2:18 PM   Changes and Difficulties   I have made the following changes to my diet since my last visit: Higher protein,  less processed food   With regards to my diet, I am still struggling with: drinking enough water   I have made the following changes to my activity/exercise since my last visit: none   With regards to my activity/exercise, I am still struggling with: following through on strength training       VITALS:  Ht 1.651 m (5' 5\")   Wt 74.4 kg (164 lb)   LMP 01/05/2022   BMI 27.29 kg/m      MEDICATIONS:   Current Outpatient Medications   Medication Sig Dispense Refill     ciclopirox (PENLAC) 8 % external solution Apply topically daily. 6 mL 4     coenzyme Q-10 capsule Take 1 capsule by mouth daily       COMPOUNDED NON-CONTROLLED SUBSTANCE (CMPD RX) - PHARMACY TO MIX COMPOUNDED MEDICATION Compounded semaglutide 1.7 mg (34 units) subcutaneous once a week. Ok to compound during Wegovy shortage. 2 mL 3     escitalopram (LEXAPRO) 10 MG tablet Take 1 tablet (10 mg) by mouth daily. Take 5 mg daily for 6 days, then increase to 10 mg daily. 90 tablet 1     ferrous sulfate 140 (45 Fe) MG TBCR CR tablet Take 140 mg by mouth daily       ketoconazole (NIZORAL) 2 % external shampoo Apply topically three times a week. Lather in the shower, wait 3-5 minutes before rinsing. 120 mL 11     lisinopril-hydrochlorothiazide (ZESTORETIC) 10-12.5 MG tablet Take 1 tablet by mouth daily 90 tablet 4     multivitamin w/minerals (THERA-VIT-M) tablet Take 1 tablet by mouth daily       rosuvastatin (CRESTOR) 5 MG tablet Take 1 tablet (5 mg) by mouth daily. 90 tablet 0     Vitamin D3 (CHOLECALCIFEROL) 25 mcg (1000 units) tablet Take 1 tablet (1,000 Units) by mouth daily Take one tablet daily. 90 tablet 3           3/3/2025     2:18 PM   Weight Loss Medication History Reviewed With Patient   Which " weight loss medications are you currently taking on a regular basis? Ozempic   Are you having any side effects from the weight loss medication that we have prescribed you? Yes   If you are having side effects please describe: hair loss; constipation       ASSESSMENT:   Karin Santacruz is a 55 year old female who I am continuing to see for treatment of obesity related to: hypertension, hyperlipidemia, depression, anxiety     Responds well to compound semaglutide 1.7 mg weekly  Has struggled with hair loss and constipation but tolerable.  Would like to continue with current dose.  Discuss that compound semaglutide will be manufactured until April as Wegovy is no long on shortage list.  Will change to compound tirzepatide    PLAN:   - compound tirzepatide 7.5 mg weekly  - continue heealthy diet modification  - encourage cardio and weight bearing exercise    FOLLOW-UP:    Return to clinic in 4-6 months.    Joined the call at 3/4/2025, 3:50:40 pm.  Left the call at 3/4/2025, 4:04:07 pm.  You were on the call for 13 minutes 27 seconds .      Sincerely,    Parul Lane MD      Again, thank you for allowing me to participate in the care of your patient.      Sincerely,    Parul Lane MD

## 2025-03-04 NOTE — PATIENT INSTRUCTIONS
Switch to compound tirzepatide 7.5 mg weekly    Return to clinic in 4-6 months    If you have any questions, please do not hesitate to call Weight management clinic at [phone] or [phone].  If you need to fax, please fax to [phone].    Sincerely,    Parul Lane MD  Endocrinology    Compound Tir[facility]epatide at [facility]  [facility] is now offering compounded tirzepatide during the time of Zepbound/Mounjaro national shortage/limited supply. Tirzepatide is the generic name of Zepbound and Mounjaro. [facility] is following the highest standards for sterility and compounding practices. Not all compounding practices are equal. Therefore, [facility] will not be prescribing compounded tirzepatide outside of the [facility]. Compounding of tirzepatide is legal for as long as Zepbound or Mounjaro is on the FDA's national shortage list. When/if Zepbound and Mounjaro are taken off the FDA's shortage list, compounded tirzepatide will no longer be legal to compound.     On February 11, 2025, the FDA provided further clarification on tirzepatide amid its ongoing national shortage.  While supply of tirzepatide continues to improve, the FDA will be using enforcement discretion until the district court issues a ruling on the plaintiffs  preliminary injunction motion in [employer] v. FDA ([place]  As a result, the compounding of tirzepatide will be permitted to continue until further notice until hearing court's ruling.     If using compound tirzepatide for weight loss: Just like any weight loss medication(s), there is a risk of weight regain should you stop tirzepatide. It is important to be aware of this risk should you stop compounded tirzepatide with no plans to transition back to an alternative injectable option as the use of tirzepatide is intended for long term weight management with the  "intention of remaining on this injectable long term.        Obtaining Medication From Pharmacy:   Automated call will contact patient when pharmacy has received RX. Patient must call the [facility] back to speak directly to team member prior to shipping medication to verify patient name, product, shipping, and cost. During this call, pharmacy technician will verify if needles/syringes will be needed and can be added to order for $5 additional cost per 10 unit syringe packs. Vials of compounded tirzepatide will be mailed from the [facility] pharmacy to your home in a refrigerated box. The vial you will be given is a multi-use vial, meaning that you will use the same vial during the next 28 days for each of your injections. Use a new syringe for each injection. The syringe that will be used to draw up your dose is a \"unit\" syringe, meaning your dose will have an associated \"mg\" and \"units\". Please see your prescription and the below information to verify the dose you should be injecting in UNITS prior to injection.     Storage:  Keep your medication stored in the refrigerator. Discard open vial after 28 days OR the expiration date on lacy bag whichever is sooner (even if there is remaining medication in the vial).     Do not pre-fill syringes from the multi-use vial for future use. Sterility cannot be verified. You should only be drawing up the amount needed for the injection that day, then placing vial back into refrigerator for storage for next injection.     Dosing:  Compound tirzepatide is available as a multi-use vial. It is a subcutaneous injection that you inject once weekly and titrate the dose slowly over time. Inject same day of the week each week, for example Monday evenings.   Doses available: 2.5 mg, 5 mg, 7.5 mg, 10 mg, 12.5 mg and 15 mg (maximum dose)  Dosing is individualized - the goal is to get to a dose that is the best tolerated and most effective for you. You are not required to " increase dose each month or get to maximum dose if getting an effective response with minimal side effects.     Tirzepatide Unit Conversion Chart   25mg/mL, 1.5 mL vial   Dose Units   2.5 mg 10   5 mg 20   7.5 mg 30   10 mg 40   12.5 mg 50   15 mg 60     Common Side Effects:  Side effect profile is the same as Zepbound/Mounjaro because it is the same active ingredient. Monitor for nausea, diarrhea, constipation, headache, indigestion, tiredness (fatigue), stomach upset or abdominal pain. Less commonly, tirzepatide can cause low blood sugar (symptoms: shaky, dizzy, sweaty, agitation). Please reach out to the care team should you feel like this is occurring. It is important to ensure that you are eating consistent meals and not skipping meals. Ensure you are getting at least 64 oz water daily. If any side effects become unmanageable, contact the care team immediately.    Administration:  Follow the instructions to fill the syringe with medicine. Instructions can be accessed at [email or web address] or by scanning this QR code-    Follow the instructions to inject your medication. Instructions can be accessed at [email or web address]  or by scanning this QR code-      Syringe Disposal:   Place the used syringe in a sharps container. You can buy a sharps container at your local pharmacy.   If you don't have a sharps container, you can use a plastic detergent container with a lid.   Visit Learning About Safe Needle Use and Disposal (healthwise.net) and safeneedledisposal.org for more information.     Cost:   ~$300 per 1.5 mL vial + $5 per 10 pack syringe/needles  Doses less than 10 mg for 1 month supply will require one 1.5 mL vial (~$300/month)  Doses 10 mg and above will require two 1.5 mL (3 mL) vials (~$600/month)     [facility] Phone:  [phone]    States to which [facility] is able to ship to: [place], [place], [place], [place], [place], [place]

## 2025-04-01 ENCOUNTER — TELEPHONE (OUTPATIENT)
Dept: ENDOCRINOLOGY | Facility: CLINIC | Age: 55
End: 2025-04-01
Payer: COMMERCIAL

## 2025-04-01 NOTE — TELEPHONE ENCOUNTER
Medication Question or Refill    Contacts       Contact Date/Time Type Contact Phone/Fax    04/01/2025 11:01 AM CDT Phone (Incoming) [facility] (Self) [phone] (M)            What medication are you calling about (include dose and sig)?: Semiglutide from the compounding pharmacy to Zepbound 7.5 mg to [facility].    Preferred Pharmacy:   [email or web address]  NPI [id number] NCPDP [id number]      Controlled Substance Agreement on file:   CSA -- Patient Level:    CSA: None found at the patient level.       Who prescribed the medication?: Dr. Teran    Do you need a refill? Yes    When did you use the medication last? 03/25/2025    Patient offered an appointment? Yes: Pt. Is going to schedule the appointment right after we send this message with Dr. Kaplan.    Do you have any questions or concerns?  Yes: Move away from the discontinued semiglutide from the compounding pharmacy to prescription directly from [facility] for Zepbound. Ask your  To send your prescription to the web address [email or web address]      Could we send this information to you [facility] or would you prefer to receive a phone call?:   Patient would prefer a phone call   Okay to leave a detailed message?: Yes at Cell number on file:    Telephone Information:   Mobile [phone]

## 2025-04-02 DIAGNOSIS — E66.811 OBESITY (BMI 30.0-34.9): Primary | ICD-10-CM

## 2025-04-11 ENCOUNTER — LAB (OUTPATIENT)
Dept: LAB | Facility: CLINIC | Age: 55
End: 2025-04-11
Attending: FAMILY MEDICINE
Payer: COMMERCIAL

## 2025-04-11 DIAGNOSIS — E78.5 DYSLIPIDEMIA: ICD-10-CM

## 2025-04-11 DIAGNOSIS — L65.9 HAIR LOSS: ICD-10-CM

## 2025-04-11 DIAGNOSIS — I10 BENIGN ESSENTIAL HYPERTENSION: ICD-10-CM

## 2025-04-11 DIAGNOSIS — R79.89 LOW VITAMIN D LEVEL: ICD-10-CM

## 2025-04-11 LAB
ANION GAP SERPL CALCULATED.3IONS-SCNC: 10 MMOL/L (ref 7–15)
BUN SERPL-MCNC: 13 MG/DL (ref 6–20)
CALCIUM SERPL-MCNC: 9.8 MG/DL (ref 8.8–10.4)
CHLORIDE SERPL-SCNC: 103 MMOL/L (ref 98–107)
CHOLEST SERPL-MCNC: 144 MG/DL
CREAT SERPL-MCNC: 0.76 MG/DL (ref 0.51–0.95)
EGFRCR SERPLBLD CKD-EPI 2021: >90 ML/MIN/1.73M2
ERYTHROCYTE [DISTWIDTH] IN BLOOD BY AUTOMATED COUNT: 11.9 % (ref 10–15)
FASTING STATUS PATIENT QL REPORTED: YES
FASTING STATUS PATIENT QL REPORTED: YES
FERRITIN SERPL-MCNC: 210 NG/ML (ref 11–328)
GLUCOSE SERPL-MCNC: 79 MG/DL (ref 70–99)
HCO3 SERPL-SCNC: 28 MMOL/L (ref 22–29)
HCT VFR BLD AUTO: 36.7 % (ref 35–47)
HDLC SERPL-MCNC: 58 MG/DL
HGB BLD-MCNC: 12.4 G/DL (ref 11.7–15.7)
LDLC SERPL CALC-MCNC: 70 MG/DL
MCH RBC QN AUTO: 31 PG (ref 26.5–33)
MCHC RBC AUTO-ENTMCNC: 33.8 G/DL (ref 31.5–36.5)
MCV RBC AUTO: 92 FL (ref 78–100)
NONHDLC SERPL-MCNC: 86 MG/DL
PLATELET # BLD AUTO: 253 10E3/UL (ref 150–450)
POTASSIUM SERPL-SCNC: 4.6 MMOL/L (ref 3.4–5.3)
RBC # BLD AUTO: 4 10E6/UL (ref 3.8–5.2)
SODIUM SERPL-SCNC: 141 MMOL/L (ref 135–145)
TRIGL SERPL-MCNC: 80 MG/DL
TSH SERPL DL<=0.005 MIU/L-ACNC: 1.82 UIU/ML (ref 0.3–4.2)
VIT D+METAB SERPL-MCNC: 73 NG/ML (ref 20–50)
WBC # BLD AUTO: 6.1 10E3/UL (ref 4–11)

## 2025-04-11 PROCEDURE — 80061 LIPID PANEL: CPT

## 2025-04-11 PROCEDURE — 80048 BASIC METABOLIC PNL TOTAL CA: CPT

## 2025-04-11 PROCEDURE — 82728 ASSAY OF FERRITIN: CPT

## 2025-04-11 PROCEDURE — 36415 COLL VENOUS BLD VENIPUNCTURE: CPT

## 2025-04-11 PROCEDURE — 85014 HEMATOCRIT: CPT

## 2025-04-11 PROCEDURE — 82306 VITAMIN D 25 HYDROXY: CPT

## 2025-04-11 PROCEDURE — 84443 ASSAY THYROID STIM HORMONE: CPT

## 2025-06-10 DIAGNOSIS — E66.811 OBESITY (BMI 30.0-34.9): ICD-10-CM

## 2025-06-10 RX ORDER — TIRZEPATIDE 7.5 MG/.5ML
INJECTION, SOLUTION SUBCUTANEOUS
Qty: 2 ML | Refills: 2 | OUTPATIENT
Start: 2025-06-10

## 2025-06-11 ENCOUNTER — ANCILLARY PROCEDURE (OUTPATIENT)
Dept: MAMMOGRAPHY | Facility: HOSPITAL | Age: 55
End: 2025-06-11
Attending: FAMILY MEDICINE
Payer: COMMERCIAL

## 2025-06-11 DIAGNOSIS — Z12.31 ENCOUNTER FOR SCREENING MAMMOGRAM FOR MALIGNANT NEOPLASM OF BREAST: ICD-10-CM

## 2025-06-11 DIAGNOSIS — E66.811 OBESITY (BMI 30.0-34.9): ICD-10-CM

## 2025-06-11 PROCEDURE — 77063 BREAST TOMOSYNTHESIS BI: CPT

## 2025-06-11 RX ORDER — TIRZEPATIDE 7.5 MG/.5ML
INJECTION, SOLUTION SUBCUTANEOUS
Qty: 2 ML | Refills: 2 | OUTPATIENT
Start: 2025-06-11

## 2025-07-15 ENCOUNTER — VIRTUAL VISIT (OUTPATIENT)
Dept: ENDOCRINOLOGY | Facility: CLINIC | Age: 55
End: 2025-07-15
Payer: COMMERCIAL

## 2025-07-15 VITALS — HEIGHT: 65 IN | BODY MASS INDEX: 26.16 KG/M2 | WEIGHT: 157 LBS

## 2025-07-15 DIAGNOSIS — E66.811 OBESITY (BMI 30.0-34.9): Primary | ICD-10-CM

## 2025-07-15 PROCEDURE — 98006 SYNCH AUDIO-VIDEO EST MOD 30: CPT | Performed by: INTERNAL MEDICINE

## 2025-07-15 PROCEDURE — G2211 COMPLEX E/M VISIT ADD ON: HCPCS | Performed by: INTERNAL MEDICINE

## 2025-07-15 PROCEDURE — 1126F AMNT PAIN NOTED NONE PRSNT: CPT | Mod: 95 | Performed by: INTERNAL MEDICINE

## 2025-07-15 ASSESSMENT — PAIN SCALES - GENERAL: PAINLEVEL_OUTOF10: NO PAIN (0)

## 2025-07-15 NOTE — PATIENT INSTRUCTIONS
PLAN:   - increase Zepbound vial to 10 mg weekly  - continue heealthy diet modification  - encourage cardio and weight training exercise    FOLLOW-UP:    See [facility] pharmD in 3 months  See MD in 6 months    If you have any questions, please do not hesitate to call Weight management clinic at [phone] or [phone].  If you need to fax, please fax to [phone].    Sincerely,    Parul Lane MD  Endocrinology

## 2025-07-15 NOTE — LETTER
7/15/2025       RE: Karin Santacruz  [address] [place] [address]     Dear Colleague,    Thank you for referring your patient, Karin Santacruz, to the [facility] MANAGEMENT [facility]IN[facility] [place] [facility]. Please see a copy of my visit note below.    Virtual Visit Details    Type of service:  Video Visit     Originating Location (pt. Location): Home    Distant Location (provider location):  Off-site  Platform used for Video Visit: [facility] Medical Weight Management Note     Karin Santacruz  MRN:  [id number]  :  1970  ZOEY:  7/15/2025    Dear Shahla Billings MD,    I had the pleasure of seeing your patient Karin Santacruz. She is a 55 year old female who I am continuing to see for treatment of obesity related to: hypertension, hyperlipidemia, depression, anxiety         2024     2:41 PM   --   I have the following health issues associated with obesity High Blood Pressure    High Cholesterol   I have the following symptoms associated with obesity Depression    Back Pain    Fatigue   Weight history: started to gain weight around s.  In : weight was 167 lbs. Went on Weight loss program ([facility] weight loss) --> Lost 25 lbs (cut down calories, frequent follow up). Gradually gained back later. Weight is up and down since then.    Eating habit: eat regular, crave high carb/fat food in general, compulsive eating, eat past full, mindless eating, lack of meal planning, Take-out or eat-out about 50%     Did [facility] 2 years ago -- outpatient intensive program -- maintained weight   Work with therapist on eating related issue.     INTERVAL HISTORY:  Last visit  3/4/2025    Was on compound semaglutide 2024 - 2025 and stopped after unavailability.  Switched to Zepbound 7.5 mg weekly. No side effects except some fatigue.   Lost 39 lbs. Medication is helpful in reducing food noise, food thought but see weight  "plateau lately.  Has struggled hair loss and constipation about 4 months ago.   Is now taking Nutrafol supplement for hair loss.    Exercise: subscribe the self exercise program x2 times per week -- strengthening training and cardio     CURRENT WEIGHT:   157 lbs 0 oz    Initial Weight (lbs): 196 lbs  Last Visits Weight: 74.4 kg (164 lb)  Cumulative weight loss (lbs): 39  Weight Loss Percentage: 19.9%        7/12/2025    10:26 AM   Changes and Difficulties   I have made the following changes to my activity/exercise since my last visit: Regular workouts       VITALS:  Ht 1.651 m (5' 5\")   Wt 71.2 kg (157 lb)   LMP 01/05/2022   BMI 26.13 kg/m      MEDICATIONS:   Current Outpatient Medications   Medication Sig Dispense Refill     coenzyme Q-10 capsule Take 1 capsule by mouth daily       escitalopram (LEXAPRO) 10 MG tablet Take 1 tablet (10 mg) by mouth daily. 90 tablet 4     ferrous sulfate 140 (45 Fe) MG TBCR CR tablet Take 140 mg by mouth daily       lisinopril-hydrochlorothiazide (ZESTORETIC) 10-12.5 MG tablet Take 1 tablet by mouth daily. 90 tablet 4     multivitamin w/minerals (THERA-VIT-M) tablet Take 1 tablet by mouth daily       rosuvastatin (CRESTOR) 5 MG tablet Take 1 tablet (5 mg) by mouth daily. 90 tablet 4     tirzepatide-weight management (ZEPBOUND) 7.5 MG/0.5ML vial Inject 0.5 mLs (7.5 mg) subcutaneously once a week. 2 mL 0     Vitamin D3 (CHOLECALCIFEROL) 25 mcg (1000 units) tablet Take 1 tablet (1,000 Units) by mouth daily Take one tablet daily. 90 tablet 3           7/12/2025    10:26 AM   Weight Loss Medication History Reviewed With Patient   Are you having any side effects from the weight loss medication that we have prescribed you? No       ASSESSMENT:   Karin Santacruz is a 55 year old female who I am continuing to see for treatment of obesity related to: hypertension, hyperlipidemia, depression, anxiety     Was on compound semaglutide 7/2024 - April 2025 and stopped after " unavailability.  Switched to Zepbound 7.5 mg weekly.  Medication is helpful in reducing food noise, food thought but see weight plateau lately.    PLAN:   - increase Zepbound vial to 10 mg weekly  - continue heealthy diet modification  - encourage cardio and weight training exercise    FOLLOW-UP:    See [facility] pharmD in 3 months  See MD in 6 months      Joined the call at 7/15/2025, 11:44:23 am.  Left the call at 7/15/2025, 11:56:38 am.  You were on the call for 12 minutes 14 seconds.    Sincerely,    Parul Lane MD    Again, thank you for allowing me to participate in the care of your patient.      Sincerely,    Parul Lane MD

## 2025-07-15 NOTE — PROGRESS NOTES
Return Medical Weight Management Note     Karin Santacruz  MRN:  [id number]  :  1970  ZOEY:  7/15/2025    Dear Shahla Billings MD,    I had the pleasure of seeing your patient Karin Santacruz. She is a 55 year old female who I am continuing to see for treatment of obesity related to: hypertension, hyperlipidemia, depression, anxiety         2024     2:41 PM   --   I have the following health issues associated with obesity High Blood Pressure    High Cholesterol   I have the following symptoms associated with obesity Depression    Back Pain    Fatigue   Weight history: started to gain weight around s.  In : weight was 167 lbs. Went on Weight loss program ([facility] weight loss) --> Lost 25 lbs (cut down calories, frequent follow up). Gradually gained back later. Weight is up and down since then.    Eating habit: eat regular, crave high carb/fat food in general, compulsive eating, eat past full, mindless eating, lack of meal planning, Take-out or eat-out about 50%     Did [facility] 2 years ago -- outpatient intensive program -- maintained weight   Work with therapist on eating related issue.     INTERVAL HISTORY:  Last visit  3/4/2025    Was on compound semaglutide 2024 - 2025 and stopped after unavailability.  Switched to Zepbound 7.5 mg weekly. No side effects except some fatigue.   Lost 39 lbs. Medication is helpful in reducing food noise, food thought but see weight plateau lately.  Has struggled hair loss and constipation about 4 months ago.   Is now taking Nutrafol supplement for hair loss.    Exercise: subscribe the self exercise program x2 times per week -- strengthening training and cardio     CURRENT WEIGHT:   157 lbs 0 oz    Initial Weight (lbs): 196 lbs  Last Visits Weight: 74.4 kg (164 lb)  Cumulative weight loss (lbs): 39  Weight Loss Percentage: 19.9%        2025    10:26 AM   Changes and Difficulties   I have made the following changes to my activity/exercise since my  "last visit: Regular workouts       VITALS:  Ht 1.651 m (5' 5\")   Wt 71.2 kg (157 lb)   LMP 01/05/2022   BMI 26.13 kg/m      MEDICATIONS:   Current Outpatient Medications   Medication Sig Dispense Refill    coenzyme Q-10 capsule Take 1 capsule by mouth daily      escitalopram (LEXAPRO) 10 MG tablet Take 1 tablet (10 mg) by mouth daily. 90 tablet 4    ferrous sulfate 140 (45 Fe) MG TBCR CR tablet Take 140 mg by mouth daily      lisinopril-hydrochlorothiazide (ZESTORETIC) 10-12.5 MG tablet Take 1 tablet by mouth daily. 90 tablet 4    multivitamin w/minerals (THERA-VIT-M) tablet Take 1 tablet by mouth daily      rosuvastatin (CRESTOR) 5 MG tablet Take 1 tablet (5 mg) by mouth daily. 90 tablet 4    tirzepatide-weight management (ZEPBOUND) 7.5 MG/0.5ML vial Inject 0.5 mLs (7.5 mg) subcutaneously once a week. 2 mL 0    Vitamin D3 (CHOLECALCIFEROL) 25 mcg (1000 units) tablet Take 1 tablet (1,000 Units) by mouth daily Take one tablet daily. 90 tablet 3           7/12/2025    10:26 AM   Weight Loss Medication History Reviewed With Patient   Are you having any side effects from the weight loss medication that we have prescribed you? No       ASSESSMENT:   Karin Santacruz is a 55 year old female who I am continuing to see for treatment of obesity related to: hypertension, hyperlipidemia, depression, anxiety     Was on compound semaglutide 7/2024 - April 2025 and stopped after unavailability.  Switched to Zepbound 7.5 mg weekly.  Medication is helpful in reducing food noise, food thought but see weight plateau lately.    PLAN:   - increase Zepbound vial to 10 mg weekly  - continue heealthy diet modification  - encourage cardio and weight training exercise    FOLLOW-UP:    See [facility] pharmD in 3 months  See MD in 6 months      Joined the call at 7/15/2025, 11:44:23 am.  Left the call at 7/15/2025, 11:56:38 am.  You were on the call for 12 minutes 14 seconds.    Sincerely,    Parul Lane MD  "

## 2025-07-15 NOTE — NURSING NOTE
Current patient location: [address] [place] [address] [address] [place] [address]    Is the patient currently in the state of [place]? YES    Visit mode: VIDEO    If the visit is dropped, the patient can be reconnected by:VIDEO VISIT: Text to cell phone:   Telephone Information:   Mobile [phone]       Will anyone else be joining the visit? NO  (If patient encounters technical issues they should call [phone]    Are changes needed to the allergy or medication list? No    Are refills needed on medications prescribed by this physician? Discuss with provider    Rooming Documentation:  Questionnaire(s) completed    Reason for visit: RECHANGELY ROCHA

## 2025-07-23 ENCOUNTER — TELEPHONE (OUTPATIENT)
Dept: ENDOCRINOLOGY | Facility: CLINIC | Age: 55
End: 2025-07-23
Payer: COMMERCIAL

## 2025-07-23 NOTE — TELEPHONE ENCOUNTER
Left Voicemail (1st Attempt) for the patient to call back and schedule the following:    Appointment type: return  Provider:  Dr. Teran   Return date: next available   Specialty phone number: [phone]  Additional appointment(s) needed:   Additonal Notes:

## (undated) RX ORDER — LIDOCAINE HYDROCHLORIDE 20 MG/ML
INJECTION, SOLUTION EPIDURAL; INFILTRATION; INTRACAUDAL; PERINEURAL
Status: DISPENSED
Start: 2020-09-08

## (undated) RX ORDER — ONDANSETRON 2 MG/ML
INJECTION INTRAMUSCULAR; INTRAVENOUS
Status: DISPENSED
Start: 2020-09-08

## (undated) RX ORDER — FENTANYL CITRATE 50 UG/ML
INJECTION, SOLUTION INTRAMUSCULAR; INTRAVENOUS
Status: DISPENSED
Start: 2020-09-08

## (undated) RX ORDER — PROPOFOL 10 MG/ML
INJECTION, EMULSION INTRAVENOUS
Status: DISPENSED
Start: 2020-09-08

## (undated) RX ORDER — FENTANYL CITRATE-0.9 % NACL/PF 10 MCG/ML
PLASTIC BAG, INJECTION (ML) INTRAVENOUS
Status: DISPENSED
Start: 2020-09-08

## (undated) RX ORDER — TRIAMCINOLONE ACETONIDE 40 MG/ML
INJECTION, SUSPENSION INTRA-ARTICULAR; INTRAMUSCULAR
Status: DISPENSED
Start: 2022-02-28

## (undated) RX ORDER — LIDOCAINE HYDROCHLORIDE 10 MG/ML
INJECTION, SOLUTION INFILTRATION; PERINEURAL
Status: DISPENSED
Start: 2022-02-28

## (undated) RX ORDER — DEXAMETHASONE SODIUM PHOSPHATE 4 MG/ML
INJECTION, SOLUTION INTRA-ARTICULAR; INTRALESIONAL; INTRAMUSCULAR; INTRAVENOUS; SOFT TISSUE
Status: DISPENSED
Start: 2020-09-08